# Patient Record
Sex: FEMALE | Race: WHITE | NOT HISPANIC OR LATINO | Employment: UNEMPLOYED | URBAN - NONMETROPOLITAN AREA
[De-identification: names, ages, dates, MRNs, and addresses within clinical notes are randomized per-mention and may not be internally consistent; named-entity substitution may affect disease eponyms.]

---

## 2019-04-12 ENCOUNTER — OFFICE VISIT CONVERTED (OUTPATIENT)
Dept: FAMILY MEDICINE CLINIC | Facility: CLINIC | Age: 49
End: 2019-04-12
Attending: NURSE PRACTITIONER

## 2019-04-12 ENCOUNTER — CONVERSION ENCOUNTER (OUTPATIENT)
Dept: FAMILY MEDICINE CLINIC | Facility: CLINIC | Age: 49
End: 2019-04-12

## 2019-09-24 ENCOUNTER — CONVERSION ENCOUNTER (OUTPATIENT)
Dept: FAMILY MEDICINE CLINIC | Facility: CLINIC | Age: 49
End: 2019-09-24

## 2019-09-24 ENCOUNTER — HOSPITAL ENCOUNTER (OUTPATIENT)
Dept: GENERAL RADIOLOGY | Facility: HOSPITAL | Age: 49
Discharge: HOME | End: 2019-09-24
Attending: NURSE PRACTITIONER

## 2019-09-24 ENCOUNTER — OFFICE VISIT CONVERTED (OUTPATIENT)
Dept: FAMILY MEDICINE CLINIC | Facility: CLINIC | Age: 49
End: 2019-09-24
Attending: NURSE PRACTITIONER

## 2019-10-01 ENCOUNTER — OFFICE VISIT CONVERTED (OUTPATIENT)
Dept: FAMILY MEDICINE CLINIC | Facility: CLINIC | Age: 49
End: 2019-10-01
Attending: NURSE PRACTITIONER

## 2019-12-31 ENCOUNTER — HOSPITAL ENCOUNTER (OUTPATIENT)
Dept: URGENT CARE | Facility: CLINIC | Age: 49
Discharge: HOME | End: 2019-12-31
Attending: FAMILY MEDICINE

## 2020-01-06 ENCOUNTER — HOSPITAL ENCOUNTER (OUTPATIENT)
Dept: GENERAL RADIOLOGY | Facility: HOSPITAL | Age: 50
Discharge: HOME | End: 2020-01-06

## 2020-01-06 ENCOUNTER — OFFICE VISIT CONVERTED (OUTPATIENT)
Dept: FAMILY MEDICINE CLINIC | Facility: CLINIC | Age: 50
End: 2020-01-06
Attending: NURSE PRACTITIONER

## 2020-01-06 ENCOUNTER — CONVERSION ENCOUNTER (OUTPATIENT)
Dept: FAMILY MEDICINE CLINIC | Facility: CLINIC | Age: 50
End: 2020-01-06

## 2020-01-06 LAB
ALBUMIN SERPL-MCNC: 4.1 G/DL (ref 3.5–5)
ALBUMIN/GLOB SERPL: 1.2 {RATIO} (ref 1.4–2.6)
ALP SERPL-CCNC: 82 U/L (ref 42–98)
ALT SERPL-CCNC: 45 U/L (ref 10–40)
ANION GAP SERPL CALC-SCNC: 18 MMOL/L (ref 8–19)
APPEARANCE UR: ABNORMAL
AST SERPL-CCNC: 50 U/L (ref 15–50)
BASOPHILS # BLD AUTO: 0.09 10*3/UL (ref 0–0.2)
BASOPHILS NFR BLD AUTO: 0.6 % (ref 0–3)
BILIRUB SERPL-MCNC: 0.2 MG/DL (ref 0.2–1.3)
BILIRUB UR QL: ABNORMAL
BUN SERPL-MCNC: 13 MG/DL (ref 5–25)
BUN/CREAT SERPL: 13 {RATIO} (ref 6–20)
CALCIUM SERPL-MCNC: 9.4 MG/DL (ref 8.7–10.4)
CASTS URNS QL MICRO: ABNORMAL /[LPF]
CHLORIDE SERPL-SCNC: 103 MMOL/L (ref 99–111)
CHOLEST SERPL-MCNC: 209 MG/DL (ref 107–200)
CHOLEST/HDLC SERPL: 2.3 {RATIO} (ref 3–6)
COLOR UR: ABNORMAL
CONV ABS IMM GRAN: 0.06 10*3/UL (ref 0–0.2)
CONV BACTERIA: ABNORMAL
CONV CO2: 26 MMOL/L (ref 22–32)
CONV COLLECTION SOURCE (UA): ABNORMAL
CONV CRYSTALS: ABNORMAL /[HPF]
CONV IMMATURE GRAN: 0.4 % (ref 0–1.8)
CONV TOTAL PROTEIN: 7.5 G/DL (ref 6.3–8.2)
CONV UROBILINOGEN IN URINE BY AUTOMATED TEST STRIP: 1 {EHRLICHU}/DL (ref 0.1–1)
CREAT UR-MCNC: 0.98 MG/DL (ref 0.5–0.9)
DEPRECATED RDW RBC AUTO: 49.4 FL (ref 36.4–46.3)
EOSINOPHIL # BLD AUTO: 0.35 10*3/UL (ref 0–0.7)
EOSINOPHIL # BLD AUTO: 2.4 % (ref 0–7)
ERYTHROCYTE [DISTWIDTH] IN BLOOD BY AUTOMATED COUNT: 14.1 % (ref 11.7–14.4)
EST. AVERAGE GLUCOSE BLD GHB EST-MCNC: 100 MG/DL
GFR SERPLBLD BASED ON 1.73 SQ M-ARVRAT: >60 ML/MIN/{1.73_M2}
GLOBULIN UR ELPH-MCNC: 3.4 G/DL (ref 2–3.5)
GLUCOSE SERPL-MCNC: 78 MG/DL (ref 65–99)
GLUCOSE UR QL: NEGATIVE MG/DL
HBA1C MFR BLD: 5.1 % (ref 3.5–5.7)
HCT VFR BLD AUTO: 42.2 % (ref 37–47)
HDLC SERPL-MCNC: 92 MG/DL (ref 40–60)
HGB BLD-MCNC: 13.7 G/DL (ref 12–16)
HGB UR QL STRIP: NEGATIVE
KETONES UR QL STRIP: ABNORMAL MG/DL
LDLC SERPL CALC-MCNC: 99 MG/DL (ref 70–100)
LEUKOCYTE ESTERASE UR QL STRIP: ABNORMAL
LYMPHOCYTES # BLD AUTO: 2.71 10*3/UL (ref 1–5)
LYMPHOCYTES NFR BLD AUTO: 18.3 % (ref 20–45)
MCH RBC QN AUTO: 30.9 PG (ref 27–31)
MCHC RBC AUTO-ENTMCNC: 32.5 G/DL (ref 33–37)
MCV RBC AUTO: 95.3 FL (ref 81–99)
MONOCYTES # BLD AUTO: 1.28 10*3/UL (ref 0.2–1.2)
MONOCYTES NFR BLD AUTO: 8.6 % (ref 3–10)
NEUTROPHILS # BLD AUTO: 10.35 10*3/UL (ref 2–8)
NEUTROPHILS NFR BLD AUTO: 69.7 % (ref 30–85)
NITRITE UR QL STRIP: POSITIVE
NRBC CBCN: 0 % (ref 0–0.7)
OSMOLALITY SERPL CALC.SUM OF ELEC: 295 MOSM/KG (ref 273–304)
PH UR STRIP.AUTO: 5.5 [PH] (ref 5–8)
PLATELET # BLD AUTO: 339 10*3/UL (ref 130–400)
PMV BLD AUTO: 10.6 FL (ref 9.4–12.3)
POTASSIUM SERPL-SCNC: 3.9 MMOL/L (ref 3.5–5.3)
PROT UR QL: ABNORMAL MG/DL
RBC # BLD AUTO: 4.43 10*6/UL (ref 4.2–5.4)
RBC #/AREA URNS HPF: ABNORMAL /[HPF]
SODIUM SERPL-SCNC: 143 MMOL/L (ref 135–147)
SP GR UR: 1.02 (ref 1–1.03)
SQUAMOUS SPT QL MICRO: ABNORMAL /[HPF]
TRIGL SERPL-MCNC: 88 MG/DL (ref 40–150)
TSH SERPL-ACNC: 1.46 M[IU]/L (ref 0.27–4.2)
VLDLC SERPL-MCNC: 18 MG/DL (ref 5–37)
WBC # BLD AUTO: 14.84 10*3/UL (ref 4.8–10.8)
WBC #/AREA URNS HPF: ABNORMAL /[HPF]

## 2020-01-09 ENCOUNTER — HOSPITAL ENCOUNTER (OUTPATIENT)
Dept: ULTRASOUND IMAGING | Facility: HOSPITAL | Age: 50
Discharge: HOME | End: 2020-01-09
Attending: NURSE PRACTITIONER

## 2020-08-04 ENCOUNTER — CONVERSION ENCOUNTER (OUTPATIENT)
Dept: FAMILY MEDICINE CLINIC | Facility: CLINIC | Age: 50
End: 2020-08-04

## 2020-08-04 ENCOUNTER — HOSPITAL ENCOUNTER (OUTPATIENT)
Dept: GENERAL RADIOLOGY | Facility: HOSPITAL | Age: 50
Discharge: HOME | End: 2020-08-04
Attending: NURSE PRACTITIONER

## 2020-08-04 ENCOUNTER — OFFICE VISIT CONVERTED (OUTPATIENT)
Dept: FAMILY MEDICINE CLINIC | Facility: CLINIC | Age: 50
End: 2020-08-04
Attending: NURSE PRACTITIONER

## 2020-08-18 ENCOUNTER — OFFICE VISIT CONVERTED (OUTPATIENT)
Dept: PODIATRY | Facility: CLINIC | Age: 50
End: 2020-08-18
Attending: PODIATRIST

## 2020-09-04 ENCOUNTER — CONVERSION ENCOUNTER (OUTPATIENT)
Dept: CARDIOLOGY | Facility: CLINIC | Age: 50
End: 2020-09-04

## 2020-09-04 ENCOUNTER — OFFICE VISIT CONVERTED (OUTPATIENT)
Dept: CARDIOLOGY | Facility: CLINIC | Age: 50
End: 2020-09-04
Attending: INTERNAL MEDICINE

## 2020-09-17 ENCOUNTER — OFFICE VISIT CONVERTED (OUTPATIENT)
Dept: CARDIOLOGY | Facility: CLINIC | Age: 50
End: 2020-09-17
Attending: INTERNAL MEDICINE

## 2020-09-30 ENCOUNTER — HOSPITAL ENCOUNTER (OUTPATIENT)
Dept: CARDIOLOGY | Facility: HOSPITAL | Age: 50
Discharge: HOME | End: 2020-09-30
Attending: INTERNAL MEDICINE

## 2020-10-23 ENCOUNTER — OFFICE VISIT CONVERTED (OUTPATIENT)
Dept: CARDIOLOGY | Facility: CLINIC | Age: 50
End: 2020-10-23
Attending: INTERNAL MEDICINE

## 2020-10-27 ENCOUNTER — TELEPHONE CONVERTED (OUTPATIENT)
Dept: FAMILY MEDICINE CLINIC | Facility: CLINIC | Age: 50
End: 2020-10-27
Attending: NURSE PRACTITIONER

## 2020-11-04 ENCOUNTER — OFFICE VISIT CONVERTED (OUTPATIENT)
Dept: FAMILY MEDICINE CLINIC | Facility: CLINIC | Age: 50
End: 2020-11-04
Attending: NURSE PRACTITIONER

## 2020-11-04 ENCOUNTER — HOSPITAL ENCOUNTER (OUTPATIENT)
Dept: OTHER | Facility: HOSPITAL | Age: 50
Discharge: HOME | End: 2020-11-04
Attending: NURSE PRACTITIONER

## 2020-11-04 ENCOUNTER — CONVERSION ENCOUNTER (OUTPATIENT)
Dept: FAMILY MEDICINE CLINIC | Facility: CLINIC | Age: 50
End: 2020-11-04

## 2020-11-10 LAB
CONV LAST MENSTURAL PERIOD: NORMAL
SPECIMEN SOURCE: NORMAL
SPECIMEN SOURCE: NORMAL
THIN PREP CVX: NORMAL

## 2021-03-02 ENCOUNTER — OFFICE VISIT (OUTPATIENT)
Dept: FAMILY MEDICINE CLINIC | Facility: CLINIC | Age: 51
End: 2021-03-02

## 2021-03-02 VITALS
HEART RATE: 115 BPM | RESPIRATION RATE: 16 BRPM | WEIGHT: 152.9 LBS | TEMPERATURE: 96.9 F | DIASTOLIC BLOOD PRESSURE: 114 MMHG | HEIGHT: 63 IN | BODY MASS INDEX: 27.09 KG/M2 | SYSTOLIC BLOOD PRESSURE: 160 MMHG | OXYGEN SATURATION: 100 %

## 2021-03-02 DIAGNOSIS — Z96.0 STATUS POST PLACEMENT OF URETERAL STENT: ICD-10-CM

## 2021-03-02 DIAGNOSIS — I10 ESSENTIAL HYPERTENSION: ICD-10-CM

## 2021-03-02 DIAGNOSIS — Z00.00 HEALTHCARE MAINTENANCE: ICD-10-CM

## 2021-03-02 DIAGNOSIS — N20.0 KIDNEY STONES: Primary | ICD-10-CM

## 2021-03-02 DIAGNOSIS — Z76.89 ENCOUNTER TO ESTABLISH CARE: ICD-10-CM

## 2021-03-02 DIAGNOSIS — Z12.11 SCREENING FOR COLON CANCER: ICD-10-CM

## 2021-03-02 LAB
BASOPHILS # BLD AUTO: 0.08 10*3/MM3 (ref 0–0.2)
BASOPHILS NFR BLD AUTO: 0.5 % (ref 0–1.5)
EOSINOPHIL # BLD AUTO: 0.57 10*3/MM3 (ref 0–0.4)
EOSINOPHIL NFR BLD AUTO: 3.8 % (ref 0.3–6.2)
ERYTHROCYTE [DISTWIDTH] IN BLOOD BY AUTOMATED COUNT: 12.3 % (ref 12.3–15.4)
HCT VFR BLD AUTO: 35.5 % (ref 34–46.6)
HGB BLD-MCNC: 12.1 G/DL (ref 12–15.9)
IMM GRANULOCYTES # BLD AUTO: 0.33 10*3/MM3 (ref 0–0.05)
IMM GRANULOCYTES NFR BLD AUTO: 2.2 % (ref 0–0.5)
LYMPHOCYTES # BLD AUTO: 2.11 10*3/MM3 (ref 0.7–3.1)
LYMPHOCYTES NFR BLD AUTO: 14.2 % (ref 19.6–45.3)
Lab: NORMAL
MCH RBC QN AUTO: 30 PG (ref 26.6–33)
MCHC RBC AUTO-ENTMCNC: 34.1 G/DL (ref 31.5–35.7)
MCV RBC AUTO: 87.9 FL (ref 79–97)
MONOCYTES # BLD AUTO: 1.63 10*3/MM3 (ref 0.1–0.9)
MONOCYTES NFR BLD AUTO: 11 % (ref 5–12)
NEUTROPHILS # BLD AUTO: 10.13 10*3/MM3 (ref 1.7–7)
NEUTROPHILS NFR BLD AUTO: 68.3 % (ref 42.7–76)
NRBC BLD AUTO-RTO: 0 /100 WBC (ref 0–0.2)
PLATELET # BLD AUTO: 379 10*3/MM3 (ref 140–450)
RBC # BLD AUTO: 4.04 10*6/MM3 (ref 3.77–5.28)
WBC # BLD AUTO: 14.85 10*3/MM3 (ref 3.4–10.8)

## 2021-03-02 PROCEDURE — 99204 OFFICE O/P NEW MOD 45 MIN: CPT | Performed by: NURSE PRACTITIONER

## 2021-03-02 RX ORDER — CLONIDINE HYDROCHLORIDE 0.3 MG/1
0.3 TABLET ORAL 3 TIMES DAILY
COMMUNITY
Start: 2021-01-06 | End: 2022-11-10

## 2021-03-02 RX ORDER — DEXTROAMPHETAMINE SACCHARATE, AMPHETAMINE ASPARTATE, DEXTROAMPHETAMINE SULFATE AND AMPHETAMINE SULFATE 5; 5; 5; 5 MG/1; MG/1; MG/1; MG/1
1 TABLET ORAL 3 TIMES DAILY
COMMUNITY
Start: 2021-02-09 | End: 2021-04-30

## 2021-03-02 RX ORDER — DEXTROAMPHETAMINE SACCHARATE, AMPHETAMINE ASPARTATE MONOHYDRATE, DEXTROAMPHETAMINE SULFATE AND AMPHETAMINE SULFATE 7.5; 7.5; 7.5; 7.5 MG/1; MG/1; MG/1; MG/1
CAPSULE, EXTENDED RELEASE ORAL EVERY MORNING
COMMUNITY
Start: 2020-12-09 | End: 2021-04-30

## 2021-03-02 RX ORDER — LOSARTAN POTASSIUM 100 MG/1
100 TABLET ORAL DAILY
COMMUNITY
Start: 2021-01-06 | End: 2021-07-13 | Stop reason: SDUPTHER

## 2021-03-02 RX ORDER — AMLODIPINE BESYLATE 10 MG/1
10 TABLET ORAL DAILY
COMMUNITY
Start: 2021-01-06 | End: 2021-04-30 | Stop reason: SDUPTHER

## 2021-03-02 RX ORDER — LORAZEPAM 0.5 MG/1
0.5 TABLET ORAL NIGHTLY PRN
COMMUNITY
Start: 2020-12-09 | End: 2022-11-10

## 2021-03-02 RX ORDER — HYDROCODONE BITARTRATE AND ACETAMINOPHEN 5; 325 MG/1; MG/1
1 TABLET ORAL EVERY 6 HOURS PRN
Qty: 12 TABLET | Refills: 0 | Status: SHIPPED | OUTPATIENT
Start: 2021-03-02 | End: 2021-04-30

## 2021-03-02 NOTE — PROGRESS NOTES
Chief Complaint  Establish Care and Flank Pain (left side since Thursday morning kidney stones)    Subjective          Vanesa Escudero presents to [facility]  Went to  on Thursday, due to flank pain, diagnosed with kidney stones. Was directed to hospital, stents placed.  [facility], transferred to [facility]. Admitted on Thursday and discharged  Transferred to the hospital via ambulance and had emergency surgery. Discharged yesterday 03/01. Reports increasing swelling, feels like she has constant urge to urinate, hematuria, feels like she is trying to pass stone.   Pain was primarily on left side. Stents placed.      Took bp medication today, elevated at 160/114, repeat did show some improvement at 140/100    LMP 3 years ago    Cefdinir 300 mg bid    Urologist jose, no follow up appointment scheduled    Flank Pain  This is a new problem. The current episode started in the past 7 days. The problem occurs constantly. The problem is unchanged. Pain location: left flank. The quality of the pain is described as aching. The pain is at a severity of 8/10. The pain is moderate. Associated symptoms include abdominal pain and dysuria. Pertinent negatives include no bladder incontinence, bowel incontinence, chest pain, fever, headaches, leg pain, numbness, paresis, paresthesias, pelvic pain, perianal numbness, tingling, weakness or weight loss. Treatments tried: iv antibiotics, now on oral cefdinir. The treatment provided mild relief.       Review of Systems   Constitutional: Negative for fever and unexpected weight loss.   Cardiovascular: Negative for chest pain.   Gastrointestinal: Positive for abdominal pain. Negative for bowel incontinence.   Genitourinary: Positive for dysuria and flank pain. Negative for pelvic pain and urinary incontinence.   Neurological: Negative for tingling, weakness, numbness and paresthesias.     Objective   Vital Signs:   BP (!) 160/114   Pulse 115    "Temp 96.9 °F (36.1 °C) (Temporal)   Resp 16   Ht 160 cm (63\")   Wt 69.4 kg (152 lb 14.4 oz)   SpO2 100%   BMI 27.09 kg/m²     Physical Exam  Vitals signs reviewed.   Constitutional:       General: She is not in acute distress.     Appearance: She is well-developed. She is not diaphoretic.   HENT:      Head: Normocephalic and atraumatic.      Right Ear: Tympanic membrane, ear canal and external ear normal.      Left Ear: Tympanic membrane, ear canal and external ear normal.      Nose: Nose normal.      Mouth/Throat:      Pharynx: Uvula midline. No oropharyngeal exudate.   Neck:      Musculoskeletal: Neck supple.   Cardiovascular:      Rate and Rhythm: Normal rate and regular rhythm.      Heart sounds: Normal heart sounds. No murmur. No friction rub. No gallop.    Pulmonary:      Effort: Pulmonary effort is normal. No respiratory distress.      Breath sounds: Normal breath sounds. No wheezing or rales.   Abdominal:      General: Bowel sounds are normal. There is no distension.      Palpations: Abdomen is soft.      Tenderness: There is no abdominal tenderness.   Skin:     General: Skin is warm and dry.   Neurological:      Mental Status: She is alert and oriented to person, place, and time.        Result Review :            discharge summary, CT scan, labs and medications reviewed at time of visit. See scanned documents.     Assessment and Plan    Diagnoses and all orders for this visit:    1. Kidney stones (Primary)  -     CBC & Differential  -     HYDROcodone-acetaminophen (NORCO) 5-325 MG per tablet; Take 1 tablet by mouth Every 6 (Six) Hours As Needed for Severe Pain .  Dispense: 12 tablet; Refill: 0    2. Encounter to establish care  -     Ambulatory Referral to Gynecology    3. Screening for colon cancer  -     Ambulatory Referral For Screening Colonoscopy    4. Healthcare maintenance  -     Ambulatory Referral to Gynecology    5. Status post placement of ureteral stent    6. Essential hypertension      I " spent 60 minutes caring for Vanesa on this date of service. This time includes time spent by me in the following activities:reviewing tests, obtaining and/or reviewing a separately obtained history, performing a medically appropriate examination and/or evaluation , counseling and educating the patient/family/caregiver, ordering medications, tests, or procedures and documenting information in the medical record  Follow Up   No follow-ups on file.  Patient was given instructions and counseling regarding her condition or for health maintenance advice. Please see specific information pulled into the AVS if appropriate.     Patient is prescribed HTN medications, taking daily  bp is increased, 160/114, repeat 140/100  Does appear to be in pain, WBC at highest 35, trending downward to 17 at discharge yesterday  Discharged on omnicef twice daily, taking medication as prescribed  Also started on flomax and   Will prescribe 3 day med supply of pain medication, hydrocodone, reviewed fuentes prior to filling, calling urology to schedule follow up appointment  Does have some abdominal bloating, however do not know baseline of patient, with generalized tenderness on left side  Repeat cbc today    No colonoscopy previously, no GYN, referrals placed for both  Will hold on immunizations at this time, consider pneumonia vaccine in future when feeling well  Follow up in two weeks unless doing well with urology  Instructions given to return to hospital for acute worsening symptoms including fever/chills,   Trying to schedule office visit asap, left message with urology

## 2021-03-08 ENCOUNTER — TELEPHONE (OUTPATIENT)
Dept: FAMILY MEDICINE CLINIC | Facility: CLINIC | Age: 51
End: 2021-03-08

## 2021-03-08 NOTE — TELEPHONE ENCOUNTER
Caller: JEFFREY DONALDSON    Relationship: Emergency Contact    Best call back number: [phone]    What medication are you requesting: ANTIBIOTIC    What are your current symptoms: KIDNEY INFECTION      Have you had these symptoms before:    [x] Yes  [] No    Have you been treated for these symptoms before:   [x] Yes  [] No    If a prescription is needed, what is your preferred pharmacy and phone number: [facility] [address] [phone] [facility] [phone] FX     Additional notes: PATIENT'S FIANCE STATES PATIENT HAD A STENT PUT INTO HER KIDNEY AND HER UROLOGIST WON'T PRESCRIBE ANTIBIOTICS UNTIL SHE SEES THEM AGAIN NEXT WEEK. PATIENT IS REQUESTING ANTIBIOTIC. CALLBACK REQUESTED ONCE SENT TO PHARMACY.

## 2021-03-09 NOTE — TELEPHONE ENCOUNTER
This is the specialist managing her case currently. She may need to get in to see them sooner, but recommend follow up with urology.

## 2021-03-14 ENCOUNTER — TELEPHONE (OUTPATIENT)
Dept: FAMILY MEDICINE CLINIC | Facility: CLINIC | Age: 51
End: 2021-03-14

## 2021-03-14 NOTE — TELEPHONE ENCOUNTER
Pt's SO Darwin called the on-call.   She is admitted at [facility] and unclear of her treatment.   They went to [facility] on 3/12 hoping to be admitted there if needing admission but given her urologist was through [facility] and did not come to [facility] she was encouraged to follow him (Dr. Mcallister) for continuity of care.   She has elevated WBC. Noted 20.4 in the chart x 2 but Darwin says her WBC was 16 today.  She is getting rocephin but said should not be getting any of this because allergic to PCN.   She was getting better after her 3 day course of abx from urology but then starting to have more pain and symptoms 3/8. Called office of PCP and advised to follow with urology given the concern and her ongoing care with them. She had appointment with Dr. Mcallister 3/9 and Darwin says pt was started on myrbetriq and then could not void well and became more sick so went to ED 3/12.  They are unsure of the plan and they are concerned about her current treatment. They have not seen Dr. Mcallister or Dr. Monroy were to come in one day but said they did not. They have only seen residents who are in and out quickly.   Because she is sick, I don't have all the notes and I don't think it is feasible at this time to, as Darwin requested, transfer pt to [facility]. I recommended they request speaking to the charge nurse to try to get some more information. It is likely because it is Sunday and late that they will only have access to residents but the residents have access to the attendings and they could get some information to help them understand the treatment and get some questions answered. Tomorrow Monday should be able to have a doctor come to the room to discuss care.   Tomorrow I will have staff request documents and studies that we do not have in the chart from [facility] and her urologist to complete the chart and facilitate question answering. Darwin is going to call the office tomorrow and give us and update. I will let Jocelyn know. She is out tomorrow  so I can help with this follow up as well.

## 2021-03-26 ENCOUNTER — HOSPITAL ENCOUNTER (OUTPATIENT)
Dept: CARDIOLOGY | Facility: HOSPITAL | Age: 51
Discharge: HOME | End: 2021-03-26
Admitting: NURSE PRACTITIONER

## 2021-03-26 ENCOUNTER — OFFICE VISIT (OUTPATIENT)
Dept: FAMILY MEDICINE CLINIC | Facility: CLINIC | Age: 51
End: 2021-03-26

## 2021-03-26 VITALS
HEIGHT: 63 IN | OXYGEN SATURATION: 98 % | SYSTOLIC BLOOD PRESSURE: 162 MMHG | HEART RATE: 99 BPM | TEMPERATURE: 97.8 F | BODY MASS INDEX: 26.95 KG/M2 | WEIGHT: 152.1 LBS | DIASTOLIC BLOOD PRESSURE: 110 MMHG

## 2021-03-26 DIAGNOSIS — I15.9 SECONDARY HYPERTENSION: ICD-10-CM

## 2021-03-26 DIAGNOSIS — N20.0 KIDNEY STONE: ICD-10-CM

## 2021-03-26 DIAGNOSIS — M79.604 BILATERAL LEG PAIN: Primary | ICD-10-CM

## 2021-03-26 DIAGNOSIS — M79.605 BILATERAL LEG PAIN: Primary | ICD-10-CM

## 2021-03-26 DIAGNOSIS — M79.89 LEG SWELLING: ICD-10-CM

## 2021-03-26 LAB
ALBUMIN SERPL-MCNC: 4.2 G/DL (ref 3.5–5.2)
ALBUMIN/GLOB SERPL: 1.2 G/DL
ALP SERPL-CCNC: 148 U/L (ref 39–117)
ALT SERPL-CCNC: 40 U/L (ref 1–33)
AST SERPL-CCNC: 44 U/L (ref 1–32)
BASOPHILS # BLD AUTO: 0.04 10*3/MM3 (ref 0–0.2)
BASOPHILS NFR BLD AUTO: 0.3 % (ref 0–1.5)
BH CV LOW VAS RIGHT POPLITEAL SPONT: 1
BH CV LOWER VASCULAR LEFT COMMON FEMORAL AUGMENT: NORMAL
BH CV LOWER VASCULAR LEFT COMMON FEMORAL COMPETENT: NORMAL
BH CV LOWER VASCULAR LEFT COMMON FEMORAL COMPRESS: NORMAL
BH CV LOWER VASCULAR LEFT COMMON FEMORAL PHASIC: NORMAL
BH CV LOWER VASCULAR LEFT COMMON FEMORAL SPONT: NORMAL
BH CV LOWER VASCULAR LEFT DISTAL FEMORAL COMPRESS: NORMAL
BH CV LOWER VASCULAR LEFT GASTRONEMIUS COMPRESS: NORMAL
BH CV LOWER VASCULAR LEFT GREATER SAPH AK COMPRESS: NORMAL
BH CV LOWER VASCULAR LEFT GREATER SAPH BK COMPRESS: NORMAL
BH CV LOWER VASCULAR LEFT LESSER SAPH COMPRESS: NORMAL
BH CV LOWER VASCULAR LEFT MID FEMORAL AUGMENT: NORMAL
BH CV LOWER VASCULAR LEFT MID FEMORAL COMPETENT: NORMAL
BH CV LOWER VASCULAR LEFT MID FEMORAL COMPRESS: NORMAL
BH CV LOWER VASCULAR LEFT MID FEMORAL PHASIC: NORMAL
BH CV LOWER VASCULAR LEFT MID FEMORAL SPONT: NORMAL
BH CV LOWER VASCULAR LEFT PERONEAL COMPRESS: NORMAL
BH CV LOWER VASCULAR LEFT POPLITEAL AUGMENT: NORMAL
BH CV LOWER VASCULAR LEFT POPLITEAL COMPETENT: NORMAL
BH CV LOWER VASCULAR LEFT POPLITEAL COMPRESS: NORMAL
BH CV LOWER VASCULAR LEFT POPLITEAL PHASIC: NORMAL
BH CV LOWER VASCULAR LEFT POPLITEAL SPONT: NORMAL
BH CV LOWER VASCULAR LEFT POSTERIOR TIBIAL COMPRESS: NORMAL
BH CV LOWER VASCULAR LEFT PROFUNDA FEMORAL COMPRESS: NORMAL
BH CV LOWER VASCULAR LEFT PROXIMAL FEMORAL COMPRESS: NORMAL
BH CV LOWER VASCULAR LEFT SAPHENOFEMORAL JUNCTION COMPRESS: NORMAL
BH CV LOWER VASCULAR RIGHT COMMON FEMORAL AUGMENT: NORMAL
BH CV LOWER VASCULAR RIGHT COMMON FEMORAL COMPETENT: NORMAL
BH CV LOWER VASCULAR RIGHT COMMON FEMORAL COMPRESS: NORMAL
BH CV LOWER VASCULAR RIGHT COMMON FEMORAL PHASIC: NORMAL
BH CV LOWER VASCULAR RIGHT COMMON FEMORAL SPONT: NORMAL
BH CV LOWER VASCULAR RIGHT DISTAL FEMORAL COMPRESS: NORMAL
BH CV LOWER VASCULAR RIGHT GASTRONEMIUS COMPRESS: NORMAL
BH CV LOWER VASCULAR RIGHT GREATER SAPH AK COMPRESS: NORMAL
BH CV LOWER VASCULAR RIGHT GREATER SAPH BK COMPRESS: NORMAL
BH CV LOWER VASCULAR RIGHT LESSER SAPH COMPRESS: NORMAL
BH CV LOWER VASCULAR RIGHT MID FEMORAL AUGMENT: NORMAL
BH CV LOWER VASCULAR RIGHT MID FEMORAL COMPETENT: NORMAL
BH CV LOWER VASCULAR RIGHT MID FEMORAL COMPRESS: NORMAL
BH CV LOWER VASCULAR RIGHT MID FEMORAL PHASIC: NORMAL
BH CV LOWER VASCULAR RIGHT MID FEMORAL SPONT: NORMAL
BH CV LOWER VASCULAR RIGHT PERONEAL COMPRESS: NORMAL
BH CV LOWER VASCULAR RIGHT POPLITEAL AUGMENT: NORMAL
BH CV LOWER VASCULAR RIGHT POPLITEAL COMPETENT: NORMAL
BH CV LOWER VASCULAR RIGHT POPLITEAL COMPRESS: NORMAL
BH CV LOWER VASCULAR RIGHT POPLITEAL PHASIC: NORMAL
BH CV LOWER VASCULAR RIGHT POPLITEAL SPONT: NORMAL
BH CV LOWER VASCULAR RIGHT POSTERIOR TIBIAL COMPRESS: NORMAL
BH CV LOWER VASCULAR RIGHT PROFUNDA FEMORAL COMPRESS: NORMAL
BH CV LOWER VASCULAR RIGHT PROXIMAL FEMORAL COMPRESS: NORMAL
BH CV LOWER VASCULAR RIGHT SAPHENOFEMORAL JUNCTION COMPRESS: NORMAL
BH CV VAS POP FLUID COLLECTED: 1
BILIRUB SERPL-MCNC: 0.2 MG/DL (ref 0–1.2)
BUN SERPL-MCNC: 13 MG/DL (ref 6–20)
BUN/CREAT SERPL: 12.5 (ref 7–25)
CALCIUM SERPL-MCNC: 10 MG/DL (ref 8.6–10.5)
CHLORIDE SERPL-SCNC: 104 MMOL/L (ref 98–107)
CO2 SERPL-SCNC: 27.3 MMOL/L (ref 22–29)
CREAT SERPL-MCNC: 1.04 MG/DL (ref 0.57–1)
EOSINOPHIL # BLD AUTO: 0.2 10*3/MM3 (ref 0–0.4)
EOSINOPHIL NFR BLD AUTO: 1.7 % (ref 0.3–6.2)
ERYTHROCYTE [DISTWIDTH] IN BLOOD BY AUTOMATED COUNT: 12.7 % (ref 12.3–15.4)
GLOBULIN SER CALC-MCNC: 3.6 GM/DL
GLUCOSE SERPL-MCNC: 111 MG/DL (ref 65–99)
HCT VFR BLD AUTO: 36.9 % (ref 34–46.6)
HGB BLD-MCNC: 12.7 G/DL (ref 12–15.9)
IMM GRANULOCYTES # BLD AUTO: 0.17 10*3/MM3 (ref 0–0.05)
IMM GRANULOCYTES NFR BLD AUTO: 1.4 % (ref 0–0.5)
LYMPHOCYTES # BLD AUTO: 1.95 10*3/MM3 (ref 0.7–3.1)
LYMPHOCYTES NFR BLD AUTO: 16.6 % (ref 19.6–45.3)
MCH RBC QN AUTO: 29.1 PG (ref 26.6–33)
MCHC RBC AUTO-ENTMCNC: 34.4 G/DL (ref 31.5–35.7)
MCV RBC AUTO: 84.6 FL (ref 79–97)
MONOCYTES # BLD AUTO: 0.97 10*3/MM3 (ref 0.1–0.9)
MONOCYTES NFR BLD AUTO: 8.3 % (ref 5–12)
NEUTROPHILS # BLD AUTO: 8.42 10*3/MM3 (ref 1.7–7)
NEUTROPHILS NFR BLD AUTO: 71.7 % (ref 42.7–76)
NRBC BLD AUTO-RTO: 0 /100 WBC (ref 0–0.2)
PLATELET # BLD AUTO: 464 10*3/MM3 (ref 140–450)
POTASSIUM SERPL-SCNC: 4.8 MMOL/L (ref 3.5–5.2)
PROT SERPL-MCNC: 7.8 G/DL (ref 6–8.5)
RBC # BLD AUTO: 4.36 10*6/MM3 (ref 3.77–5.28)
SODIUM SERPL-SCNC: 139 MMOL/L (ref 136–145)
TSH SERPL DL<=0.005 MIU/L-ACNC: 0.49 UIU/ML (ref 0.27–4.2)
WBC # BLD AUTO: 11.75 10*3/MM3 (ref 3.4–10.8)

## 2021-03-26 PROCEDURE — 99214 OFFICE O/P EST MOD 30 MIN: CPT | Performed by: NURSE PRACTITIONER

## 2021-03-26 PROCEDURE — 93970 EXTREMITY STUDY: CPT

## 2021-03-26 RX ORDER — TAMSULOSIN HYDROCHLORIDE 0.4 MG/1
0.4 CAPSULE ORAL DAILY
COMMUNITY
Start: 2021-03-12 | End: 2021-04-30

## 2021-03-26 RX ORDER — NITROFURANTOIN MACROCRYSTALS 100 MG/1
100 CAPSULE ORAL 4 TIMES DAILY
COMMUNITY
End: 2021-04-30

## 2021-03-26 RX ORDER — METOPROLOL SUCCINATE 50 MG/1
50 TABLET, EXTENDED RELEASE ORAL DAILY
Qty: 30 TABLET | Refills: 2 | Status: SHIPPED | OUTPATIENT
Start: 2021-03-26 | End: 2021-04-05 | Stop reason: SDUPTHER

## 2021-03-26 RX ORDER — HYDROCHLOROTHIAZIDE 12.5 MG/1
12.5 TABLET ORAL DAILY
Qty: 30 TABLET | Refills: 2 | Status: SHIPPED | OUTPATIENT
Start: 2021-03-26 | End: 2021-07-14

## 2021-03-26 NOTE — PATIENT INSTRUCTIONS
Discharge instructions    Stat venous Doppler study today I will talk to you in a little bit on your results    Need better control blood pressure HCTZ 12.5 mg a day  Generic Toprol 50 mg daily for blood pressure recheck in 1 week    Blood pressure and leg pain  Push fluids a minimum of 64 ounces of water daily  Elevate legs when sitting focus on breads and pastas and sodas decrease these they are high in sodium and cause fluid retention    Pressure stockings JUAN CARLOS hose 20-30 knee-high proper fit on the morning off at night for swelling    Increased chest pain shortness of breath high fever lethargy weakness abdominal pain pelvic pain emergency room    Fluids fluids fluids

## 2021-03-26 NOTE — PROGRESS NOTES
BLE Venous doppler study complete. Preliminary negative for DVT, fluid at right pop fossa 5 x 2 cm called to james Epley APRN

## 2021-03-26 NOTE — PROGRESS NOTES
"Chief Complaint  knot behind right knee size of an egg    Subjective          Vanesa Escudero presents to [facility]  Patient complains of posterior right knee pain leg pain and swelling x1 week no history of injury she has had quite a bit of swelling in her lower extremities yesterday's better today no chest pain no shortness of breath she has no fevers no chills no abdominal pain or other complaints.  She has had no redness or streaking  Recently had sepsis was in the hospital for over a week, and she had a stent placed last week complications regarding obstructed kidney stone.  She has had a really lousy month she has no history DVT PE no family history of coagulation difficulties.  No chronic knee pains no history of malignancy.    Uncontrolled hypertension, uncontrolled despite she is compliant with her medications  Recheck today with similar results 162/110        Objective   Vital Signs:   BP (!) 162/110   Pulse 99   Temp 97.8 °F (36.6 °C) (Temporal)   Ht 160 cm (63\")   Wt 69 kg (152 lb 1.6 oz)   SpO2 98%   BMI 26.94 kg/m²     Physical Exam  Vitals reviewed.   Constitutional:       Appearance: She is well-developed.   HENT:      Head: Normocephalic.      Nose: Nose normal.   Eyes:      General: No scleral icterus.     Conjunctiva/sclera: Conjunctivae normal.      Pupils: Pupils are equal, round, and reactive to light.   Neck:      Thyroid: No thyromegaly.      Vascular: No JVD.   Cardiovascular:      Rate and Rhythm: Normal rate and regular rhythm.      Heart sounds: Normal heart sounds. No murmur heard.   No friction rub. No gallop.    Pulmonary:      Effort: Pulmonary effort is normal. No respiratory distress.      Breath sounds: Normal breath sounds. No stridor. No wheezing or rales.   Abdominal:      General: Bowel sounds are normal. There is no distension.      Palpations: Abdomen is soft.      Tenderness: There is no abdominal tenderness.      Comments: No " hepatosplenomegaly, no ascites,   Musculoskeletal:         General: Swelling and tenderness present. No deformity or signs of injury. Normal range of motion.      Cervical back: Neck supple.      Right lower leg: No edema.      Comments: Right knee full range of motion without effusion or redness or tenderness except she has some mild swelling posterior knee and tenderness without redness increased heat other signs and symptoms of cellulitis.  No calf tenderness negative Homans negative thigh tenderness   Lymphadenopathy:      Cervical: No cervical adenopathy.   Skin:     General: Skin is warm and dry.      Findings: No erythema or rash.   Neurological:      Mental Status: She is alert and oriented to person, place, and time.      Deep Tendon Reflexes: Reflexes are normal and symmetric.   Psychiatric:         Behavior: Behavior normal.         Thought Content: Thought content normal.         Judgment: Judgment normal.        Result Review :                 Assessment and Plan {CC Problem List  Visit Diagnosis  ROS  Review (Popup)  [facility] Maintenance  Quality  BestPractice  Medications  SmartSets  SnapShot Encounters  Media :23}   Diagnoses and all orders for this visit:    1. Bilateral leg pain (Primary)  -     Duplex Venous Lower Extremity - Bilateral CAR  -     CBC & Differential  -     Comprehensive Metabolic Panel  -     TSH Rfx On Abnormal To Free T4    2. Leg swelling  -     Duplex Venous Lower Extremity - Bilateral CAR  -     CBC & Differential  -     Comprehensive Metabolic Panel  -     TSH Rfx On Abnormal To Free T4    3. Secondary hypertension  -     CBC & Differential  -     Comprehensive Metabolic Panel  -     TSH Rfx On Abnormal To Free T4    4. Kidney stone  -     CBC & Differential  -     Comprehensive Metabolic Panel  -     TSH Rfx On Abnormal To Free T4    Other orders  -     hydroCHLOROthiazide (HYDRODIURIL) 12.5 MG tablet; Take 1 tablet by mouth Daily.  Dispense: 30 tablet; Refill:  2  -     metoprolol succinate XL (Toprol XL) 50 MG 24 hr tablet; Take 1 tablet by mouth Daily. For hypertension  Dispense: 30 tablet; Refill: 2        Follow Up   No follow-ups on file.  Patient was given instructions and counseling regarding her condition or for health maintenance advice. Please see specific information pulled into the AVS if appropriate.   Patient is high risk for DVT with immobilization secondary to recent sepsis and stone and UTI.  She has no significant edema around her ankles today    Rule out DVTs stat venous Doppler ultrasound results came back without any acute DVT she has a 5 cm x 2 cm pocket of fluid posterior knee otherwise no acute abnormality.  Discussed with patient instructed elevate legs whenever sitting  Ice as needed no other medications regarding the knee at this time she will continue her routine medication finish her antibiotic  Should she have increased chest pain shortness of breath fever chills emergency room she will send me a message next week for update if is not improving in a couple weeks we will send her to Ortho or sooner if worsens severe pain redness swelling emergency room      Check recent renal function no failure,  We will use a very mild diuretic as well as metoprolol she has had no problems of metoprolol in the past although it was not adequate and had to be changed follow-up blood pressure readings next week and schedule appointment with Jocelyn Ardon

## 2021-04-05 ENCOUNTER — OFFICE VISIT (OUTPATIENT)
Dept: FAMILY MEDICINE CLINIC | Facility: CLINIC | Age: 51
End: 2021-04-05

## 2021-04-05 VITALS
DIASTOLIC BLOOD PRESSURE: 99 MMHG | OXYGEN SATURATION: 95 % | HEART RATE: 91 BPM | HEIGHT: 63 IN | WEIGHT: 150.5 LBS | SYSTOLIC BLOOD PRESSURE: 150 MMHG | TEMPERATURE: 97.5 F | BODY MASS INDEX: 26.67 KG/M2

## 2021-04-05 DIAGNOSIS — S76.211A STRAIN OF RIGHT GROIN: Primary | ICD-10-CM

## 2021-04-05 DIAGNOSIS — S76.911A MUSCLE STRAIN OF RIGHT THIGH, INITIAL ENCOUNTER: ICD-10-CM

## 2021-04-05 DIAGNOSIS — M25.561 ACUTE PAIN OF RIGHT KNEE: ICD-10-CM

## 2021-04-05 DIAGNOSIS — S76.211A INGUINAL STRAIN, RIGHT, INITIAL ENCOUNTER: ICD-10-CM

## 2021-04-05 DIAGNOSIS — I15.9 SECONDARY HYPERTENSION: ICD-10-CM

## 2021-04-05 PROCEDURE — 99214 OFFICE O/P EST MOD 30 MIN: CPT | Performed by: NURSE PRACTITIONER

## 2021-04-05 RX ORDER — METOPROLOL SUCCINATE 100 MG/1
100 TABLET, EXTENDED RELEASE ORAL DAILY
Qty: 30 TABLET | Refills: 2 | Status: SHIPPED | OUTPATIENT
Start: 2021-04-05 | End: 2022-03-16 | Stop reason: SDUPTHER

## 2021-04-05 NOTE — PROGRESS NOTES
"Chief Complaint  follow-up leg pain    Subjective          Vanesa Escudero presents to [facility]  Very pleasant patient here today follow-up right posterior knee pain and tenderness  Small lump and recent venous Doppler study to rule out any DVT.  She has no increasing pain no increasing redness or swelling with this she has been trying to improve her health but she is been in the hospital for sepsis and kidney stones  She is at the gym working out doing thigh and groin presses no immediate injury but later on that night started having right inguinal pain and groin pain and swelling persist for several days.  She is without chest pain shortness of breath presently without redness fever pain down her legs.  She has no increased edema.    She has chronic hypertension secondary hypertension she is on 5 hypertensives and her blood pressure is uncontrolled she does smoke but only modestly overweight    I do not think she has had a scan of her renal arteries and we will proceed with   Blood pressure still elevated no chest pain or shortness of breath she wants to quit smoking tobacco soon          Objective   Vital Signs:   /99   Pulse 91   Temp 97.5 °F (36.4 °C) (Temporal)   Ht 160 cm (63\")   Wt 68.3 kg (150 lb 8 oz)   SpO2 95%   BMI 26.66 kg/m²     Physical Exam  Vitals reviewed.   Constitutional:       Appearance: She is well-developed.   HENT:      Head: Normocephalic.      Nose: Nose normal.   Eyes:      General: No scleral icterus.     Conjunctiva/sclera: Conjunctivae normal.      Pupils: Pupils are equal, round, and reactive to light.   Neck:      Thyroid: No thyromegaly.      Vascular: No JVD.   Cardiovascular:      Rate and Rhythm: Normal rate and regular rhythm.      Heart sounds: Normal heart sounds. No murmur heard.   No friction rub. No gallop.    Pulmonary:      Effort: Pulmonary effort is normal. No respiratory distress.      Breath sounds: Normal breath sounds. No " stridor. No wheezing or rales.   Abdominal:      General: Bowel sounds are normal. There is no distension.      Palpations: Abdomen is soft.      Tenderness: There is no abdominal tenderness.      Comments: No hepatosplenomegaly, no ascites,   Musculoskeletal:         General: No tenderness.      Cervical back: Neck supple.      Comments: Tenderness right inguinal ligament and right medial thigh but does not extend into the posterior knee or any calf tenderness.  No redness no edema more tenderness over the inguinal ligament insertion site no palpable abnormality, appears equal bilateral,  Gait normal   Lymphadenopathy:      Cervical: No cervical adenopathy.   Skin:     General: Skin is warm and dry.      Findings: No erythema or rash.   Neurological:      Mental Status: She is alert and oriented to person, place, and time.      Deep Tendon Reflexes: Reflexes are normal and symmetric.   Psychiatric:         Behavior: Behavior normal.         Thought Content: Thought content normal.         Judgment: Judgment normal.        Result Review :                 Assessment and Plan    There are no diagnoses linked to this encounter.    Follow Up   No follow-ups on file.  Patient was given instructions and counseling regarding her condition or for health maintenance advice. Please see specific information pulled into the AVS if appropriate.     Patient's physical exam matches her HPI, she has no evidence of any fracture or ligament disruption she has no history of trauma other than working out too hard    Light stretching relaxation heat or ice  She will follow instructions in the office here  She should recheck in a week with MARIO Simmons any severe pain fever chills weakness emergency room

## 2021-04-05 NOTE — PATIENT INSTRUCTIONS
Discharge instructions    See Dr. Mcdonald for possible cyst right posterior knee for an evaluation    You have a right groin strain heat massage gentle massage heat or ice whichever helps the most  Your kidney function was borderline today we need to repeat this to rule out any chronic renal insufficiency  Generally should avoid ibuprofen naproxen  Anti-inflammatories can also raise your blood pressure    Take Tylenol 650 extended release 2 tablets twice daily    Flexeril 5 mg 1 tablet 3 times a day as needed for muscle spasms do not take if without spasm caution potential sedation    Some light stretching massage treat this as a injury and should improve over the next 2 to 3 weeks if is not having significant improvement in a couple weeks we may need to the MRI but I doubt this    Outpatient ultrasound to evaluate the blood supply to your kidneys to rule out secondary hypertension related to any blood supply issues    Encourage you to quit smoking as well as  Recommend vascular screenings    Consider CT calcium score coronary arteries at some point the next couple years of this year  Insurance does not cover this this is a screen for early heart disease talk to Jocelyn about these things  Schedule follow-up visit with Jocelyn sometime the next couple weeks    Increase metoprolol 100 mg daily for blood pressure and continue her other antihypertensives really focus on decreasing sodium in your diet bread pasta rice etc. its in there.    Slowly get back to your routine over the next several months you can go ahead and work out but just work around your pain of your groin and use light weights

## 2021-04-06 ENCOUNTER — TELEPHONE (OUTPATIENT)
Dept: FAMILY MEDICINE CLINIC | Facility: CLINIC | Age: 51
End: 2021-04-06

## 2021-04-06 ENCOUNTER — BULK ORDERING (OUTPATIENT)
Dept: CASE MANAGEMENT | Facility: OTHER | Age: 51
End: 2021-04-06

## 2021-04-06 DIAGNOSIS — Z23 IMMUNIZATION DUE: ICD-10-CM

## 2021-04-06 NOTE — TELEPHONE ENCOUNTER
Darwin the patients tomy called in stating one of the patients RX was not sent to the pharmacy.    Best call back # [phone]

## 2021-04-14 ENCOUNTER — TELEPHONE (OUTPATIENT)
Dept: FAMILY MEDICINE CLINIC | Facility: CLINIC | Age: 51
End: 2021-04-14

## 2021-04-14 DIAGNOSIS — R79.89 ELEVATED LFTS: Primary | ICD-10-CM

## 2021-04-14 NOTE — TELEPHONE ENCOUNTER
Caller: Vanesa Escudero    Relationship: Self    Best call back number: [phone]    What is the best time to reach you: AFTER 4:00 PM    Who are you requesting to speak with (clinical staff, provider,  specific staff member): CLINICAL STAFF    Do you know the name of the person who called: PATIENT COULD NOT REMEMBER THE NAME.     What was the call regarding: LAB RESULTS    Do you require a callback: YES

## 2021-04-19 ENCOUNTER — OFFICE VISIT (OUTPATIENT)
Dept: FAMILY MEDICINE CLINIC | Facility: CLINIC | Age: 51
End: 2021-04-19

## 2021-04-19 ENCOUNTER — OFFICE VISIT (OUTPATIENT)
Dept: ORTHOPEDIC SURGERY | Facility: CLINIC | Age: 51
End: 2021-04-19

## 2021-04-19 VITALS
DIASTOLIC BLOOD PRESSURE: 86 MMHG | SYSTOLIC BLOOD PRESSURE: 126 MMHG | HEART RATE: 88 BPM | HEIGHT: 63 IN | BODY MASS INDEX: 26.58 KG/M2 | OXYGEN SATURATION: 96 % | WEIGHT: 150 LBS

## 2021-04-19 VITALS — WEIGHT: 150 LBS | TEMPERATURE: 98.5 F | HEIGHT: 63 IN | BODY MASS INDEX: 26.58 KG/M2

## 2021-04-19 DIAGNOSIS — R79.89 ELEVATED LIVER FUNCTION TESTS: ICD-10-CM

## 2021-04-19 DIAGNOSIS — M25.561 RIGHT KNEE PAIN, UNSPECIFIED CHRONICITY: Primary | ICD-10-CM

## 2021-04-19 DIAGNOSIS — S76.211D INGUINAL STRAIN, RIGHT, SUBSEQUENT ENCOUNTER: ICD-10-CM

## 2021-04-19 DIAGNOSIS — I10 HYPERTENSION, UNSPECIFIED TYPE: Primary | ICD-10-CM

## 2021-04-19 DIAGNOSIS — D72.829 LEUKOCYTOSIS, UNSPECIFIED TYPE: ICD-10-CM

## 2021-04-19 DIAGNOSIS — N28.9 RENAL INSUFFICIENCY: ICD-10-CM

## 2021-04-19 PROCEDURE — 99213 OFFICE O/P EST LOW 20 MIN: CPT | Performed by: NURSE PRACTITIONER

## 2021-04-19 PROCEDURE — 20610 DRAIN/INJ JOINT/BURSA W/O US: CPT | Performed by: ORTHOPAEDIC SURGERY

## 2021-04-19 PROCEDURE — 99203 OFFICE O/P NEW LOW 30 MIN: CPT | Performed by: ORTHOPAEDIC SURGERY

## 2021-04-19 PROCEDURE — 73562 X-RAY EXAM OF KNEE 3: CPT | Performed by: ORTHOPAEDIC SURGERY

## 2021-04-19 RX ORDER — METHYLPREDNISOLONE ACETATE 80 MG/ML
80 INJECTION, SUSPENSION INTRA-ARTICULAR; INTRALESIONAL; INTRAMUSCULAR; SOFT TISSUE
Status: COMPLETED | OUTPATIENT
Start: 2021-04-19 | End: 2021-04-19

## 2021-04-19 RX ORDER — LIDOCAINE HYDROCHLORIDE 20 MG/ML
2 INJECTION, SOLUTION EPIDURAL; INFILTRATION; INTRACAUDAL; PERINEURAL
Status: COMPLETED | OUTPATIENT
Start: 2021-04-19 | End: 2021-04-19

## 2021-04-19 RX ADMIN — LIDOCAINE HYDROCHLORIDE 2 ML: 20 INJECTION, SOLUTION EPIDURAL; INFILTRATION; INTRACAUDAL; PERINEURAL at 16:14

## 2021-04-19 RX ADMIN — METHYLPREDNISOLONE ACETATE 80 MG: 80 INJECTION, SUSPENSION INTRA-ARTICULAR; INTRALESIONAL; INTRAMUSCULAR; SOFT TISSUE at 16:14

## 2021-04-19 NOTE — PATIENT INSTRUCTIONS
Discharge instructions continue present plan focus on decreasing calories decreasing sodium in your diet decrease breads Posta sweets processed foods    Change people places and things when you quit smoking,    Renal ultrasound of your arteries to rule out stenosis or secondary cause of your blood pressure we should identify.    If we still have difficulty controlling your blood pressure  You may need to see a nephrologist I suspect however yours was exacerbated by your recent hospitalization

## 2021-04-19 NOTE — PROGRESS NOTES
"Patient: Vanesa Escudero    YOB: 1970    Medical Record Number: [id number]    Chief Complaints:  Right knee pain    History of Present Illness:     51 y.o. female patient who presents for evaluation of the right knee.  The symptoms began about 6 or 7 weeks ago.  The symptoms started after she was hospitalized for kidney stones.  She does not recall any injury.  She says that her pain is typically mild to moderate, roughly 4 out of 10.  She describes it as throbbing and dull.  She has noticed associated swelling, stiffness and giving way.  She works as a  at [employer] and is on her feet quite a bit.  Her work does aggravate her knee.  Denies any catching, popping or locking.  She has noticed swelling in the back of her knee.  She has also noticed what she describes as a \"fluid collection\" along the lateral aspect of her knee.  When asked to localize her pain, she gestures to the lateral knee.  Denies other injuries or complaints.      Allergies:   Allergies   Allergen Reactions   • Bactrim [Sulfamethoxazole-Trimethoprim] Hives   • Penicillins Hives       Home Medications    Current Outpatient Medications:   •  amLODIPine (NORVASC) 10 MG tablet, Take 10 mg by mouth Daily. for blood pressure, Disp: , Rfl:   •  amphetamine-dextroamphetamine XR (ADDERALL XR) 30 MG 24 hr capsule, Take by mouth Every Morning, Disp: , Rfl:   •  cloNIDine (CATAPRES) 0.3 MG tablet, Take 0.3 mg by mouth 2 (Two) Times a Day., Disp: , Rfl:   •  hydroCHLOROthiazide (HYDRODIURIL) 12.5 MG tablet, Take 1 tablet by mouth Daily., Disp: 30 tablet, Rfl: 2  •  losartan (COZAAR) 100 MG tablet, Take 100 mg by mouth Daily., Disp: , Rfl:   •  amphetamine-dextroamphetamine (ADDERALL) 20 MG tablet, Take 1 tablet by mouth 3 (Three) Times a Day., Disp: , Rfl:   •  HYDROcodone-acetaminophen (NORCO) 5-325 MG per tablet, Take 1 tablet by mouth Every 6 (Six) Hours As Needed for Severe Pain ., Disp: 12 tablet, Rfl: 0  •  LORazepam (ATIVAN) " 0.5 MG tablet, TAKE 1 TABLET BY MOUTH AT BEDTIME AS NEEDED FOR ANXIETY AND SLEEP, Disp: , Rfl:   •  metoprolol succinate XL (Toprol XL) 100 MG 24 hr tablet, Take 1 tablet by mouth Daily. For hypertension, Disp: 30 tablet, Rfl: 2  •  nitrofurantoin (MACRODANTIN) 100 MG capsule, Take 100 mg by mouth 4 (Four) Times a Day., Disp: , Rfl:   •  tamsulosin (FLOMAX) 0.4 MG capsule 24 hr capsule, 0.4 mg., Disp: , Rfl:     Past Medical History:   Diagnosis Date   • ADHD (attention deficit hyperactivity disorder)    • Anxiety    • Hypertension    • Kidney stone        Past Surgical History:   Procedure Laterality Date   • BACK SURGERY     • FOOT SURGERY Left        Social History     Occupational History   • Not on file   Tobacco Use   • Smoking status: Current Every Day Smoker     Packs/day: 1.00     Years: 20.00     Pack years: 20.00     Types: Cigarettes   • Smokeless tobacco: Never Used   Vaping Use   • Vaping Use: Every day   • Substances: Nicotine (occasional)   Substance and Sexual Activity   • Alcohol use: Not Currently   • Drug use: Never   • Sexual activity: Yes     Partners: Male      Social History     Social History Narrative    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  GI: Denies abdominal pain, nausea, vomiting, bloody stools or diarrhea  : Denies frequency, urgency, incontinence, retention, or nocturia.  Musculoskeletal: Denies numbness,  "tingling or loss of motor function except as above  Integument: Denies rash, lesion or ulceration   Neurologic: Denies headache or focal weakness, deficits  Heme: Denies spontaneous or excessive bleeding, epistaxis, hematuria, melena, fatigue, enlarged or tender lymph nodes.      All other pertinent positives and negatives as noted above in HPI.    Physical Exam:   51 y.o. female  Vitals:    04/19/21 1540   Temp: 98.5 °F (36.9 °C)   Weight: 68 kg (150 lb)   Height: 160 cm (63\")     General:  Patient is awake and alert.  Appears in no acute distress or discomfort.    Psych:  Affect and demeanor are appropriate.    Eyes:  Conjunctiva and sclera appear grossly normal.  Eyes track well and EOM seem to be intact.    Ears:  No gross abnormalities.  Hearing adequate for the exam.    Cardiovascular:  Regular rate and rhythm.    Lungs:  Good chest expansion.  Breathing unlabored.    Spine:  Back appears grossly normal.  No palpable masses or adenopathy.  Good motion.  Straight leg raise and crossed straight leg raise maneuver are both negative for lower leg and/or knee pain.    Extremities:  Right knee is examined.  Skin is benign.  No palpable adenopathy.  She does have what seems to be a palpable Baker's cyst posteriorly.  No erythema or increased warmth.  Moderate tenderness noted over lateral joint line.  She has what I think is a parameniscal cyst along the lateral joint line as well.  There is kind of a soft fluid collection just adjacent to the anterolateral joint line.  Motion is full and symmetric to the contralateral side.  Moderate discomfort with hyperflexion.  Positive lateral Jose Francisco's test for pain.  No instability.  Strength is well preserved including hip flexion, knee extension, ankle and toe plantarflexion, ankle inversion and eversion.  Good sensory function throughout the leg and foot.  Palpable pulses.  Brisk capillary refill.  Good skin turgor.         Radiology:   Bilateral standing AP views, " bilateral merchants views and a lateral view of the right knee are ordered by myself and reviewed to evaluate the patient's complaint.  No comparison films are immediately available.  The x-rays show no acute abnormalities, lesions, masses, significant degenerative changes, malalignment or other concerning findings.    I reviewed the report of her duplex ultrasound from March 26.  The report describes a popliteal fluid collection.    Assessment/Plan:  Right Baker's cyst with suspected lateral meniscal tear    We discussed options for her.  We talked about a possible MRI.  She says that she really does not want to go down that road right now.  She is not interested in any surgical options.  For now, she is just interested in feeling better and getting back to work.  We discussed the option of a possible injection.  She liked that idea.  The risk, benefits and alternatives were discussed but she consented and the injection was performed as described below.  She will follow-up with me as needed.  I told her I will be happy to work this up further if the symptoms persist.  She can just let me know and I will refer her for the MRI.    Mello Mcdonald MD    04/19/2021    CC to Jocelyn Ardon APRN    Large Joint Arthrocentesis: R knee  Date/Time: 4/19/2021 4:14 PM  Consent given by: patient  Site marked: site marked  Timeout: Immediately prior to procedure a time out was called to verify the correct patient, procedure, equipment, support staff and site/side marked as required   Supporting Documentation  Indications: pain   Procedure Details  Location: knee - R knee  Preparation: Patient was prepped and draped in the usual sterile fashion  Needle gauge: 21G.  Approach: anterolateral  Medications administered: 2 mL lidocaine PF 2% 2 %; 80 mg methylPREDNISolone acetate 80 MG/ML  Patient tolerance: patient tolerated the procedure well with no immediate complications

## 2021-04-21 ENCOUNTER — TELEPHONE (OUTPATIENT)
Dept: ORTHOPEDIC SURGERY | Facility: CLINIC | Age: 51
End: 2021-04-21

## 2021-04-21 ENCOUNTER — OFFICE VISIT (OUTPATIENT)
Dept: ORTHOPEDIC SURGERY | Facility: CLINIC | Age: 51
End: 2021-04-21

## 2021-04-21 VITALS — WEIGHT: 150 LBS | TEMPERATURE: 97.1 F | HEIGHT: 63 IN | BODY MASS INDEX: 26.58 KG/M2

## 2021-04-21 DIAGNOSIS — M19.071 ARTHRITIS OF FIRST METATARSOPHALANGEAL (MTP) JOINT OF RIGHT FOOT: Primary | ICD-10-CM

## 2021-04-21 DIAGNOSIS — M20.41 HAMMER TOE OF RIGHT FOOT: ICD-10-CM

## 2021-04-21 DIAGNOSIS — S93.334A: ICD-10-CM

## 2021-04-21 DIAGNOSIS — R52 PAIN: ICD-10-CM

## 2021-04-21 DIAGNOSIS — M25.561 RIGHT KNEE PAIN, UNSPECIFIED CHRONICITY: ICD-10-CM

## 2021-04-21 PROCEDURE — 73630 X-RAY EXAM OF FOOT: CPT | Performed by: ORTHOPAEDIC SURGERY

## 2021-04-21 PROCEDURE — 99406 BEHAV CHNG SMOKING 3-10 MIN: CPT | Performed by: ORTHOPAEDIC SURGERY

## 2021-04-21 PROCEDURE — 99214 OFFICE O/P EST MOD 30 MIN: CPT | Performed by: ORTHOPAEDIC SURGERY

## 2021-04-21 RX ORDER — VANCOMYCIN HYDROCHLORIDE 1 G/200ML
15 INJECTION, SOLUTION INTRAVENOUS ONCE
Status: CANCELLED | OUTPATIENT
Start: 2021-05-04

## 2021-04-21 NOTE — TELEPHONE ENCOUNTER
Dr. Jackson wants to order a knee scooter for 6 months.  Please check and tell me if this is correct

## 2021-04-21 NOTE — PROGRESS NOTES
"   New Patient Complaint      Patient: Vanesa Escudero  YOB: 1970 51 y.o. female  Medical Record Number: [id number]    Chief Complaints: My foot hurts    History of Present Illness: Patient reports chronic pain in her right foot that was worsened about 4 to 5 months ago when she was taking in the garden placing plantar pressure on the right forefoot.  She has had subsequent deformity and worsening pain around the second MTP joint and chronic pain and stiffness around the first MTP joint of the right foot.    She has been seen elsewhere for her left foot and had previous surgery which has not done particularly well but main complaint is her right foot today with moderate aching pain around the first MTP joint and pain around the second toe at the MTP and PIP joints with decreased motion and no significant pain in the third fourth or fifth toes.    She works as a  at [employer] continues to smoke but is trying to use \"E cigs\"        HPI    Allergies:   Allergies   Allergen Reactions   • Bactrim [Sulfamethoxazole-Trimethoprim] Hives   • Penicillins Hives       Medications:   Current Outpatient Medications on File Prior to Visit   Medication Sig   • amLODIPine (NORVASC) 10 MG tablet Take 10 mg by mouth Daily. for blood pressure   • amphetamine-dextroamphetamine (ADDERALL) 20 MG tablet Take 1 tablet by mouth 3 (Three) Times a Day.   • amphetamine-dextroamphetamine XR (ADDERALL XR) 30 MG 24 hr capsule Take by mouth Every Morning   • cloNIDine (CATAPRES) 0.3 MG tablet Take 0.3 mg by mouth 2 (Two) Times a Day.   • hydroCHLOROthiazide (HYDRODIURIL) 12.5 MG tablet Take 1 tablet by mouth Daily.   • HYDROcodone-acetaminophen (NORCO) 5-325 MG per tablet Take 1 tablet by mouth Every 6 (Six) Hours As Needed for Severe Pain .   • LORazepam (ATIVAN) 0.5 MG tablet TAKE 1 TABLET BY MOUTH AT BEDTIME AS NEEDED FOR ANXIETY AND SLEEP   • losartan (COZAAR) 100 MG tablet Take 100 mg by mouth Daily.   • metoprolol " "succinate XL (Toprol XL) 100 MG 24 hr tablet Take 1 tablet by mouth Daily. For hypertension   • nitrofurantoin (MACRODANTIN) 100 MG capsule Take 100 mg by mouth 4 (Four) Times a Day.   • tamsulosin (FLOMAX) 0.4 MG capsule 24 hr capsule 0.4 mg.     No current facility-administered medications on file prior to visit.       Past Medical History:   Diagnosis Date   • ADHD (attention deficit hyperactivity disorder)    • Anxiety    • Hypertension    • Kidney stone      Past Surgical History:   Procedure Laterality Date   • BACK SURGERY     • FOOT SURGERY Left      Social History     Occupational History   • Not on file   Tobacco Use   • Smoking status: Current Every Day Smoker     Packs/day: 1.00     Years: 20.00     Pack years: 20.00     Types: Cigarettes   • Smokeless tobacco: Never Used   Vaping Use   • Vaping Use: Every day   • Substances: Nicotine (occasional)   Substance and Sexual Activity   • Alcohol use: Not Currently   • Drug use: Never   • Sexual activity: Yes     Partners: Male      Social History     Social History Narrative    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 All remaining systems negative except sinus pressure headaches, back pain or joint swelling    Review of Systems      Physical Exam:   Vitals:    04/21/21 1149   Temp: 97.1 °F (36.2 °C)   Weight: 68 kg (150 lb)   Height: 160 cm (63\")   PainSc:   5     Physical Exam   Constitutional: pleasant, well developed   Eyes: sclera non icteric  Hearing : adequate for exam  Cardiovascular: palpable pulses in right foot, right calf/ thigh NT without sign of DVT  Respiratoy: breathing unlabored   Neurological: grossly sensate to " LT throughout right LE  Psychiatric: oriented with normal mood and affect.   Lymphatic: No palpable popliteal lymphadenopathy right LE  Skin: intact throughout right leg/foot  Musculoskeletal: Right foot shows moderate hallux valgus deformity with crepitus on range of motion from 4 degrees dorsiflexion degrees plantarflexion of the toes not quite passively correctable.  There is cock-up deformity of the second MTP joint with dorsal subluxation/dislocation of the MTP joint which is somewhat correctable but does not seem to really reduce readily.  There is pain and semirigid hammering the PIP joint.  There was minimal if any deformity to the third toe and no tenderness to palpation  Physical Exam  Ortho Exam    Radiology: 3 views the right foot ordered evaluate pain and alignment reviewed and no prior x-rays available for comparison there is significant arthritic change with hallux valgus of the first MTP joint there is dorsal dislocation of the second MTP joint with relative elongation and some cystic change in the base of the proximal phalanx with hammering of the second toe.    Assessment/Plan: 1.  Right hallux valgus with first MTP arthritis  2.  Right second MTP joint dislocation with contracture and probable plantar plate injury none 3.  Right second hammertoe.    A long discussion regarding operative and nonoperative treatment options and for nonoperative standpoint at all likely anything is can reduce the second MTP joint and wide accommodative shoes and hammertoe splint may give her some modicum of relief briefly but not can be of any long-lasting effect.    Although no guarantees can be given she would like to proceed with operative treatment.  My recommendation given her pain and alignment would be for first MTP joint fusion with calcaneal bone graft as well as second metatarsal osteotomy in order to decompress the second MTP joint and reduce this as well as extensor tendon lengthening and joint release and  proximal interphalangeal joint resection and fusion.    She voiced a clear understanding of the operative procedure with associated risk benefits potential outcomes and complications and she will be nonweightbearing for at least 6 weeks and will return to full weightbearing for at least 3 to 4 months and will have a pain coming out of her toe for least 3 to 4 weeks.    Risk-benefit change outcomes and complications can include but not limited to heart attack stroke death pneumonia infection bleeding damage to blood vessels nerves or tendons blood clots pulmonary embolism persistent or worsening pain stiffness malunion nonunion need for subsequent surgery recurrent instability as well as very to return to presurgery and precondition levels of activity and wound healing complications and small risk of fracture or infection calcaneal bone graft harvest site.    Regarding persistent habitual smoking.  I have discussed for at least 4 minutes with the patient the ill effects of continued smoking on pulmonary and cardiovascular health as well as financial burden.  I also discussed at length the effects on peripheral circulation as well as inhibition of soft tissue and bone healing.  I've recommended that they speak with their primary care physician regarding cessation techniques.      Arrangements were made for her to get a scooter for use for least 6 weeks postoperatively and she was fitted with a hammertoe splint to use in the interim to give her some modicum of relief.    We will plan to schedule this on outpatient basis at mutually convenient time and she understands to call if she has any questions prior to surgery.

## 2021-04-26 ENCOUNTER — TRANSCRIBE ORDERS (OUTPATIENT)
Dept: PREADMISSION TESTING | Facility: HOSPITAL | Age: 51
End: 2021-04-26

## 2021-04-26 DIAGNOSIS — Z01.818 OTHER SPECIFIED PRE-OPERATIVE EXAMINATION: Primary | ICD-10-CM

## 2021-04-27 ENCOUNTER — HOSPITAL ENCOUNTER (OUTPATIENT)
Dept: CARDIOLOGY | Facility: HOSPITAL | Age: 51
Discharge: HOME | End: 2021-04-27
Admitting: NURSE PRACTITIONER

## 2021-04-27 LAB
BH CV ECHO MEAS - DIST REN A EDV LEFT: 29.2 CM/SEC
BH CV ECHO MEAS - DIST REN A PSV LEFT: 122 CM/SEC
BH CV ECHO MEAS - DIST REN A RI LEFT: 0.76
BH CV ECHO MEAS - MID REN A EDV LEFT: 25 CM/SEC
BH CV ECHO MEAS - MID REN A PSV LEFT: 96 CM/SEC
BH CV ECHO MEAS - MID REN A RI LEFT: 0.83
BH CV ECHO MEAS - PROX REN A EDV LEFT: 28 CM/SEC
BH CV ECHO MEAS - PROX REN A PSV LEFT: 117 CM/SEC
BH CV ECHO MEAS - PROX REN A RI LEFT: 0.76
BH CV VAS BP LEFT ARM: NORMAL MMHG
BH CV VAS BP RIGHT ARM: NORMAL MMHG
BH CV VAS RENAL AORTIC MID EDV: 13 CM/S
BH CV VAS RENAL AORTIC MID PSV: 67 CM/S
BH CV VAS RENAL HILUM LEFT EDV: 10 CM/S
BH CV VAS RENAL HILUM LEFT PSV: 42 CM/S
BH CV VAS RENAL HILUM RIGHT EDV: 11 CM/S
BH CV VAS RENAL HILUM RIGHT PSV: 42 CM/S
BH CV XLRA MEAS - KID L LEFT: 10 CM
BH CV XLRA MEAS - RENAL A ORG RI LEFT: 0.8
BH CV XLRA MEAS DIST REN A EDV RIGHT: 20.5 CM/SEC
BH CV XLRA MEAS DIST REN A PSV RIGHT: 82.6 CM/SEC
BH CV XLRA MEAS DIST REN A RI RIGHT: 0.75
BH CV XLRA MEAS KID L RIGHT: 10.1 CM
BH CV XLRA MEAS KID W RIGHT: 4.1 CM
BH CV XLRA MEAS MID REN A EDV RIGHT: 24.1 CM/SEC
BH CV XLRA MEAS MID REN A PSV RIGHT: 106 CM/SEC
BH CV XLRA MEAS MID REN A RI RIGHT: 0.77
BH CV XLRA MEAS PROX REN A EDV RIGHT: 38.1 CM/SEC
BH CV XLRA MEAS PROX REN A PSV RIGHT: 150 CM/SEC
BH CV XLRA MEAS PROX REN A RI RIGHT: 0.75
BH CV XLRA MEAS RAR LEFT: 1.75
BH CV XLRA MEAS RAR RIGHT: 2.36
BH CV XLRA MEAS RENAL A ORG EDV LEFT: -15.8 CM/SEC
BH CV XLRA MEAS RENAL A ORG EDV RIGHT: 40.5 CM/SEC
BH CV XLRA MEAS RENAL A ORG PSV LEFT: -77.2 CM/SEC
BH CV XLRA MEAS RENAL A ORG PSV RIGHT: 158 CM/SEC
BH CV XLRA MEAS RENAL A ORG RI RIGHT: 0.74
LEFT KIDNEY WIDTH: 5.1 CM
LEFT RENAL UPPER PARENCHYMA MAX: 20 CM/S
LEFT RENAL UPPER PARENCHYMA MIN: 7 CM/S
LEFT RENAL UPPER PARENCHYMA RI: 0.65
RIGHT RENAL UPPER PARENCHYMA MAX: 21 CM/S
RIGHT RENAL UPPER PARENCHYMA MIN: 6 CM/S
RIGHT RENAL UPPER PARENCHYMA RI: 0.71

## 2021-04-27 PROCEDURE — 93975 VASCULAR STUDY: CPT

## 2021-04-27 NOTE — PROGRESS NOTES
"Chief Complaint  blood pressure follow-up    Subjective          Vanesa Escudero presents to [facility]  Follow-up essential hypertension blood pressure better controlled today 126/86 no chest pain shortness of breath right groin strain after working out inguinal ligaments, much better much better decrease pain not bothering a whole lot now able to walk much better no chest pain shortness of breath abdominal pain mild renal insufficiency on recent labs will need to repeat and some elevated liver test      Objective   Vital Signs:   /86   Pulse 88   Ht 160 cm (63\")   Wt 68 kg (150 lb)   SpO2 96%   BMI 26.57 kg/m²     Physical Exam  Vitals reviewed.   Constitutional:       Appearance: She is well-developed.   HENT:      Head: Normocephalic.      Nose: Nose normal.   Eyes:      General: No scleral icterus.     Conjunctiva/sclera: Conjunctivae normal.      Pupils: Pupils are equal, round, and reactive to light.   Neck:      Thyroid: No thyromegaly.      Vascular: No JVD.   Cardiovascular:      Rate and Rhythm: Normal rate and regular rhythm.      Heart sounds: Normal heart sounds. No murmur heard.   No friction rub. No gallop.    Pulmonary:      Effort: Pulmonary effort is normal. No respiratory distress.      Breath sounds: Normal breath sounds. No stridor. No wheezing or rales.   Abdominal:      General: Bowel sounds are normal. There is no distension.      Palpations: Abdomen is soft.      Tenderness: There is no abdominal tenderness.      Comments: No hepatosplenomegaly, no ascites,   Musculoskeletal:         General: No tenderness.      Cervical back: Neck supple.   Lymphadenopathy:      Cervical: No cervical adenopathy.   Skin:     General: Skin is warm and dry.      Findings: No erythema or rash.   Neurological:      Mental Status: She is alert and oriented to person, place, and time.      Deep Tendon Reflexes: Reflexes are normal and symmetric.   Psychiatric:         " Behavior: Behavior normal.         Thought Content: Thought content normal.         Judgment: Judgment normal.        Result Review :                 Assessment and Plan    Diagnoses and all orders for this visit:    1. Hypertension, unspecified type (Primary)  -     Comprehensive Metabolic Panel; Future  -     CBC & Differential; Future    2. Inguinal strain, right, subsequent encounter    3. Renal insufficiency  -     Comprehensive Metabolic Panel; Future  -     CBC & Differential; Future    4. Elevated liver function tests  -     Comprehensive Metabolic Panel; Future  -     CBC & Differential; Future    5. Leukocytosis, unspecified type  -     Comprehensive Metabolic Panel; Future  -     CBC & Differential; Future        Follow Up   No follow-ups on file.  Patient was given instructions and counseling regarding her condition or for health maintenance advice. Please see specific information pulled into the AVS if appropriate.     Continue healthy diet regular exercise, 64 ounces of water daily avoid any anti-inflammatories renal insufficiency  She will follow up with Jocelyn Ardon check blood pressure weekly should average less than 130/80  She may need ultrasound of her kidney function is impaired and further evaluation

## 2021-04-30 ENCOUNTER — PRE-ADMISSION TESTING (OUTPATIENT)
Dept: PREADMISSION TESTING | Facility: HOSPITAL | Age: 51
End: 2021-04-30

## 2021-04-30 VITALS
OXYGEN SATURATION: 100 % | DIASTOLIC BLOOD PRESSURE: 85 MMHG | BODY MASS INDEX: 25.52 KG/M2 | HEART RATE: 61 BPM | HEIGHT: 63 IN | SYSTOLIC BLOOD PRESSURE: 130 MMHG | RESPIRATION RATE: 16 BRPM | TEMPERATURE: 98.2 F | WEIGHT: 144 LBS

## 2021-04-30 LAB
ANION GAP SERPL CALCULATED.3IONS-SCNC: 9.1 MMOL/L (ref 5–15)
BUN SERPL-MCNC: 21 MG/DL (ref 6–20)
BUN/CREAT SERPL: 20.4 (ref 7–25)
CALCIUM SPEC-SCNC: 9.2 MG/DL (ref 8.6–10.5)
CHLORIDE SERPL-SCNC: 109 MMOL/L (ref 98–107)
CO2 SERPL-SCNC: 21.9 MMOL/L (ref 22–29)
CREAT SERPL-MCNC: 1.03 MG/DL (ref 0.57–1)
DEPRECATED RDW RBC AUTO: 43.3 FL (ref 37–54)
ERYTHROCYTE [DISTWIDTH] IN BLOOD BY AUTOMATED COUNT: 14.2 % (ref 12.3–15.4)
GFR SERPL CREATININE-BSD FRML MDRD: 57 ML/MIN/1.73
GFR SERPL CREATININE-BSD FRML MDRD: 69 ML/MIN/1.73
GLUCOSE SERPL-MCNC: 107 MG/DL (ref 65–99)
HCG SERPL QL: NEGATIVE
HCT VFR BLD AUTO: 35 % (ref 34–46.6)
HGB BLD-MCNC: 12 G/DL (ref 12–15.9)
MCH RBC QN AUTO: 29.1 PG (ref 26.6–33)
MCHC RBC AUTO-ENTMCNC: 34.3 G/DL (ref 31.5–35.7)
MCV RBC AUTO: 84.7 FL (ref 79–97)
PLATELET # BLD AUTO: 257 10*3/MM3 (ref 140–450)
PMV BLD AUTO: 10.5 FL (ref 6–12)
POTASSIUM SERPL-SCNC: 3.9 MMOL/L (ref 3.5–5.2)
QT INTERVAL: 433 MS
RBC # BLD AUTO: 4.13 10*6/MM3 (ref 3.77–5.28)
SODIUM SERPL-SCNC: 140 MMOL/L (ref 136–145)
WBC # BLD AUTO: 10.53 10*3/MM3 (ref 3.4–10.8)

## 2021-04-30 PROCEDURE — 80048 BASIC METABOLIC PNL TOTAL CA: CPT

## 2021-04-30 PROCEDURE — 93005 ELECTROCARDIOGRAM TRACING: CPT

## 2021-04-30 PROCEDURE — 36415 COLL VENOUS BLD VENIPUNCTURE: CPT

## 2021-04-30 PROCEDURE — 85027 COMPLETE CBC AUTOMATED: CPT

## 2021-04-30 PROCEDURE — 84703 CHORIONIC GONADOTROPIN ASSAY: CPT

## 2021-04-30 PROCEDURE — 93010 ELECTROCARDIOGRAM REPORT: CPT | Performed by: INTERNAL MEDICINE

## 2021-04-30 RX ORDER — AMLODIPINE BESYLATE 10 MG/1
10 TABLET ORAL DAILY
Qty: 90 TABLET | Refills: 1 | Status: SHIPPED | OUTPATIENT
Start: 2021-04-30 | End: 2022-03-16 | Stop reason: SDUPTHER

## 2021-05-01 ENCOUNTER — LAB (OUTPATIENT)
Dept: LAB | Facility: HOSPITAL | Age: 51
End: 2021-05-01

## 2021-05-01 DIAGNOSIS — Z01.818 OTHER SPECIFIED PRE-OPERATIVE EXAMINATION: ICD-10-CM

## 2021-05-01 PROCEDURE — U0004 COV-19 TEST NON-CDC HGH THRU: HCPCS

## 2021-05-01 PROCEDURE — C9803 HOPD COVID-19 SPEC COLLECT: HCPCS

## 2021-05-03 LAB — SARS-COV-2 RNA RESP QL NAA+PROBE: NOT DETECTED

## 2021-05-04 ENCOUNTER — APPOINTMENT (OUTPATIENT)
Dept: GENERAL RADIOLOGY | Facility: HOSPITAL | Age: 51
End: 2021-05-04

## 2021-05-04 ENCOUNTER — ANESTHESIA EVENT (OUTPATIENT)
Dept: PERIOP | Facility: HOSPITAL | Age: 51
End: 2021-05-04

## 2021-05-04 ENCOUNTER — HOSPITAL ENCOUNTER (OUTPATIENT)
Facility: HOSPITAL | Age: 51
Setting detail: HOSPITAL OUTPATIENT SURGERY
Discharge: HOME | End: 2021-05-04
Attending: ORTHOPAEDIC SURGERY | Admitting: ORTHOPAEDIC SURGERY

## 2021-05-04 ENCOUNTER — ANESTHESIA (OUTPATIENT)
Dept: PERIOP | Facility: HOSPITAL | Age: 51
End: 2021-05-04

## 2021-05-04 VITALS
OXYGEN SATURATION: 99 % | DIASTOLIC BLOOD PRESSURE: 92 MMHG | RESPIRATION RATE: 16 BRPM | TEMPERATURE: 98.7 F | SYSTOLIC BLOOD PRESSURE: 136 MMHG | HEART RATE: 69 BPM

## 2021-05-04 DIAGNOSIS — M20.41 HAMMER TOE OF RIGHT FOOT: ICD-10-CM

## 2021-05-04 DIAGNOSIS — M19.071 ARTHRITIS OF FIRST METATARSOPHALANGEAL (MTP) JOINT OF RIGHT FOOT: ICD-10-CM

## 2021-05-04 DIAGNOSIS — S93.334A: ICD-10-CM

## 2021-05-04 PROCEDURE — 76000 FLUOROSCOPY <1 HR PHYS/QHP: CPT

## 2021-05-04 PROCEDURE — 25010000002 VANCOMYCIN PER 500 MG: Performed by: ORTHOPAEDIC SURGERY

## 2021-05-04 PROCEDURE — C1713 ANCHOR/SCREW BN/BN,TIS/BN: HCPCS | Performed by: ORTHOPAEDIC SURGERY

## 2021-05-04 PROCEDURE — 73630 X-RAY EXAM OF FOOT: CPT

## 2021-05-04 PROCEDURE — 25010000002 FENTANYL CITRATE (PF) 100 MCG/2ML SOLUTION: Performed by: NURSE ANESTHETIST, CERTIFIED REGISTERED

## 2021-05-04 PROCEDURE — C1769 GUIDE WIRE: HCPCS | Performed by: ORTHOPAEDIC SURGERY

## 2021-05-04 PROCEDURE — 25010000002 FENTANYL CITRATE (PF) 100 MCG/2ML SOLUTION: Performed by: ANESTHESIOLOGY

## 2021-05-04 PROCEDURE — C1713 ANCHOR/SCREW BN/BN,TIS/BN: HCPCS

## 2021-05-04 PROCEDURE — 76942 ECHO GUIDE FOR BIOPSY: CPT | Performed by: ORTHOPAEDIC SURGERY

## 2021-05-04 PROCEDURE — 25010000002 DEXAMETHASONE PER 1 MG: Performed by: NURSE ANESTHETIST, CERTIFIED REGISTERED

## 2021-05-04 PROCEDURE — 25010000002 DEXAMETHASONE PER 1 MG: Performed by: ANESTHESIOLOGY

## 2021-05-04 PROCEDURE — 28750 FUSION OF BIG TOE JOINT: CPT | Performed by: ORTHOPAEDIC SURGERY

## 2021-05-04 PROCEDURE — 28308 INCISION OF METATARSAL: CPT | Performed by: ORTHOPAEDIC SURGERY

## 2021-05-04 PROCEDURE — 20902 REMOVAL OF BONE FOR GRAFT: CPT | Performed by: ORTHOPAEDIC SURGERY

## 2021-05-04 PROCEDURE — 25010000002 HYDROMORPHONE PER 4 MG: Performed by: ANESTHESIOLOGY

## 2021-05-04 PROCEDURE — 25010000002 PROPOFOL 10 MG/ML EMULSION: Performed by: NURSE ANESTHETIST, CERTIFIED REGISTERED

## 2021-05-04 PROCEDURE — 28645 REPAIR TOE DISLOCATION: CPT | Performed by: ORTHOPAEDIC SURGERY

## 2021-05-04 PROCEDURE — 25010000002 ONDANSETRON PER 1 MG: Performed by: NURSE ANESTHETIST, CERTIFIED REGISTERED

## 2021-05-04 PROCEDURE — 28285 REPAIR OF HAMMERTOE: CPT | Performed by: ORTHOPAEDIC SURGERY

## 2021-05-04 PROCEDURE — 25010000002 HYDROMORPHONE PER 4 MG: Performed by: NURSE ANESTHETIST, CERTIFIED REGISTERED

## 2021-05-04 PROCEDURE — 25010000002 MIDAZOLAM PER 1 MG: Performed by: ANESTHESIOLOGY

## 2021-05-04 DEVICE — SCRW VA TI 3X14MM: Type: IMPLANTABLE DEVICE | Site: TOE FIRST | Status: FUNCTIONAL

## 2021-05-04 DEVICE — SCRW COMPR NOHD FUL/THRD TI MICRO 2.5X24MM: Type: IMPLANTABLE DEVICE | Site: TOE FIRST | Status: FUNCTIONAL

## 2021-05-04 DEVICE — SCRW CORT FT/ANKL L/P TI 3X16MM: Type: IMPLANTABLE DEVICE | Site: TOE FIRST | Status: FUNCTIONAL

## 2021-05-04 DEVICE — IMPLANTABLE DEVICE: Type: IMPLANTABLE DEVICE | Site: TOE FIRST | Status: FUNCTIONAL

## 2021-05-04 DEVICE — SCRW VA TI 3X16MM: Type: IMPLANTABLE DEVICE | Site: TOE FIRST | Status: FUNCTIONAL

## 2021-05-04 RX ORDER — ONDANSETRON 2 MG/ML
INJECTION INTRAMUSCULAR; INTRAVENOUS AS NEEDED
Status: DISCONTINUED | OUTPATIENT
Start: 2021-05-04 | End: 2021-05-04 | Stop reason: SURG

## 2021-05-04 RX ORDER — VANCOMYCIN HYDROCHLORIDE 1 G/200ML
15 INJECTION, SOLUTION INTRAVENOUS ONCE
Status: COMPLETED | OUTPATIENT
Start: 2021-05-04 | End: 2021-05-04

## 2021-05-04 RX ORDER — PROPOFOL 10 MG/ML
VIAL (ML) INTRAVENOUS AS NEEDED
Status: DISCONTINUED | OUTPATIENT
Start: 2021-05-04 | End: 2021-05-04 | Stop reason: SURG

## 2021-05-04 RX ORDER — HYDROMORPHONE HCL 110MG/55ML
PATIENT CONTROLLED ANALGESIA SYRINGE INTRAVENOUS AS NEEDED
Status: DISCONTINUED | OUTPATIENT
Start: 2021-05-04 | End: 2021-05-04 | Stop reason: SURG

## 2021-05-04 RX ORDER — FLUMAZENIL 0.1 MG/ML
0.2 INJECTION INTRAVENOUS AS NEEDED
Status: DISCONTINUED | OUTPATIENT
Start: 2021-05-04 | End: 2021-05-04 | Stop reason: HOSPADM

## 2021-05-04 RX ORDER — DEXAMETHASONE SODIUM PHOSPHATE 4 MG/ML
INJECTION, SOLUTION INTRA-ARTICULAR; INTRALESIONAL; INTRAMUSCULAR; INTRAVENOUS; SOFT TISSUE
Status: COMPLETED | OUTPATIENT
Start: 2021-05-04 | End: 2021-05-04

## 2021-05-04 RX ORDER — EPHEDRINE SULFATE 50 MG/ML
5 INJECTION, SOLUTION INTRAVENOUS ONCE AS NEEDED
Status: DISCONTINUED | OUTPATIENT
Start: 2021-05-04 | End: 2021-05-04 | Stop reason: HOSPADM

## 2021-05-04 RX ORDER — MIDAZOLAM HYDROCHLORIDE 1 MG/ML
2 INJECTION INTRAMUSCULAR; INTRAVENOUS
Status: DISCONTINUED | OUTPATIENT
Start: 2021-05-04 | End: 2021-05-04 | Stop reason: HOSPADM

## 2021-05-04 RX ORDER — DEXAMETHASONE SODIUM PHOSPHATE 10 MG/ML
INJECTION INTRAMUSCULAR; INTRAVENOUS AS NEEDED
Status: DISCONTINUED | OUTPATIENT
Start: 2021-05-04 | End: 2021-05-04 | Stop reason: SURG

## 2021-05-04 RX ORDER — OXYCODONE AND ACETAMINOPHEN 7.5; 325 MG/1; MG/1
1 TABLET ORAL ONCE AS NEEDED
Status: DISCONTINUED | OUTPATIENT
Start: 2021-05-04 | End: 2021-05-04 | Stop reason: HOSPADM

## 2021-05-04 RX ORDER — FENTANYL CITRATE 50 UG/ML
INJECTION, SOLUTION INTRAMUSCULAR; INTRAVENOUS AS NEEDED
Status: DISCONTINUED | OUTPATIENT
Start: 2021-05-04 | End: 2021-05-04 | Stop reason: SURG

## 2021-05-04 RX ORDER — FENTANYL CITRATE 50 UG/ML
100 INJECTION, SOLUTION INTRAMUSCULAR; INTRAVENOUS
Status: DISCONTINUED | OUTPATIENT
Start: 2021-05-04 | End: 2021-05-04 | Stop reason: HOSPADM

## 2021-05-04 RX ORDER — ONDANSETRON 2 MG/ML
4 INJECTION INTRAMUSCULAR; INTRAVENOUS ONCE AS NEEDED
Status: DISCONTINUED | OUTPATIENT
Start: 2021-05-04 | End: 2021-05-04 | Stop reason: HOSPADM

## 2021-05-04 RX ORDER — SODIUM CHLORIDE 0.9 % (FLUSH) 0.9 %
3 SYRINGE (ML) INJECTION EVERY 12 HOURS SCHEDULED
Status: DISCONTINUED | OUTPATIENT
Start: 2021-05-04 | End: 2021-05-04 | Stop reason: HOSPADM

## 2021-05-04 RX ORDER — FENTANYL CITRATE 50 UG/ML
50 INJECTION, SOLUTION INTRAMUSCULAR; INTRAVENOUS
Status: DISCONTINUED | OUTPATIENT
Start: 2021-05-04 | End: 2021-05-04 | Stop reason: HOSPADM

## 2021-05-04 RX ORDER — BUPIVACAINE HYDROCHLORIDE 2.5 MG/ML
INJECTION, SOLUTION EPIDURAL; INFILTRATION; INTRACAUDAL
Status: COMPLETED | OUTPATIENT
Start: 2021-05-04 | End: 2021-05-04

## 2021-05-04 RX ORDER — HYDROCODONE BITARTRATE AND ACETAMINOPHEN 7.5; 325 MG/1; MG/1
1 TABLET ORAL ONCE AS NEEDED
Status: DISCONTINUED | OUTPATIENT
Start: 2021-05-04 | End: 2021-05-04 | Stop reason: HOSPADM

## 2021-05-04 RX ORDER — OXYCODONE HYDROCHLORIDE AND ACETAMINOPHEN 5; 325 MG/1; MG/1
1 TABLET ORAL ONCE AS NEEDED
Status: DISCONTINUED | OUTPATIENT
Start: 2021-05-04 | End: 2021-05-04 | Stop reason: HOSPADM

## 2021-05-04 RX ORDER — HYDROMORPHONE HYDROCHLORIDE 1 MG/ML
0.5 INJECTION, SOLUTION INTRAMUSCULAR; INTRAVENOUS; SUBCUTANEOUS
Status: DISCONTINUED | OUTPATIENT
Start: 2021-05-04 | End: 2021-05-04 | Stop reason: HOSPADM

## 2021-05-04 RX ORDER — CLINDAMYCIN HYDROCHLORIDE 150 MG/1
150 CAPSULE ORAL 3 TIMES DAILY
Qty: 6 CAPSULE | Refills: 0 | Status: SHIPPED | OUTPATIENT
Start: 2021-05-04 | End: 2021-05-06

## 2021-05-04 RX ORDER — LIDOCAINE HYDROCHLORIDE 20 MG/ML
INJECTION, SOLUTION INFILTRATION; PERINEURAL AS NEEDED
Status: DISCONTINUED | OUTPATIENT
Start: 2021-05-04 | End: 2021-05-04 | Stop reason: SURG

## 2021-05-04 RX ORDER — SODIUM CHLORIDE 0.9 % (FLUSH) 0.9 %
3-10 SYRINGE (ML) INJECTION AS NEEDED
Status: DISCONTINUED | OUTPATIENT
Start: 2021-05-04 | End: 2021-05-04 | Stop reason: HOSPADM

## 2021-05-04 RX ORDER — MIDAZOLAM HYDROCHLORIDE 1 MG/ML
1 INJECTION INTRAMUSCULAR; INTRAVENOUS
Status: DISCONTINUED | OUTPATIENT
Start: 2021-05-04 | End: 2021-05-04 | Stop reason: HOSPADM

## 2021-05-04 RX ORDER — OXYCODONE HYDROCHLORIDE AND ACETAMINOPHEN 5; 325 MG/1; MG/1
1-2 TABLET ORAL EVERY 4 HOURS PRN
Qty: 50 TABLET | Refills: 0 | Status: SHIPPED | OUTPATIENT
Start: 2021-05-04 | End: 2021-05-06 | Stop reason: ALTCHOICE

## 2021-05-04 RX ORDER — BUPIVACAINE HYDROCHLORIDE 5 MG/ML
INJECTION, SOLUTION EPIDURAL; INTRACAUDAL
Status: COMPLETED | OUTPATIENT
Start: 2021-05-04 | End: 2021-05-04

## 2021-05-04 RX ORDER — SODIUM CHLORIDE, SODIUM LACTATE, POTASSIUM CHLORIDE, CALCIUM CHLORIDE 600; 310; 30; 20 MG/100ML; MG/100ML; MG/100ML; MG/100ML
9 INJECTION, SOLUTION INTRAVENOUS CONTINUOUS
Status: DISCONTINUED | OUTPATIENT
Start: 2021-05-04 | End: 2021-05-04 | Stop reason: HOSPADM

## 2021-05-04 RX ORDER — LIDOCAINE HYDROCHLORIDE 10 MG/ML
0.5 INJECTION, SOLUTION EPIDURAL; INFILTRATION; INTRACAUDAL; PERINEURAL ONCE AS NEEDED
Status: COMPLETED | OUTPATIENT
Start: 2021-05-04 | End: 2021-05-04

## 2021-05-04 RX ADMIN — MIDAZOLAM 2 MG: 1 INJECTION INTRAMUSCULAR; INTRAVENOUS at 07:05

## 2021-05-04 RX ADMIN — FENTANYL CITRATE 100 MCG: 50 INJECTION INTRAMUSCULAR; INTRAVENOUS at 07:40

## 2021-05-04 RX ADMIN — LIDOCAINE HYDROCHLORIDE 0.5 ML: 10 INJECTION, SOLUTION EPIDURAL; INFILTRATION; INTRACAUDAL; PERINEURAL at 06:40

## 2021-05-04 RX ADMIN — HYDROMORPHONE HYDROCHLORIDE 0.5 MG: 1 INJECTION, SOLUTION INTRAMUSCULAR; INTRAVENOUS; SUBCUTANEOUS at 11:54

## 2021-05-04 RX ADMIN — FENTANYL CITRATE 50 MCG: 50 INJECTION, SOLUTION INTRAMUSCULAR; INTRAVENOUS at 11:33

## 2021-05-04 RX ADMIN — LIDOCAINE HYDROCHLORIDE 100 MG: 20 INJECTION, SOLUTION INFILTRATION; PERINEURAL at 07:40

## 2021-05-04 RX ADMIN — SODIUM CHLORIDE, POTASSIUM CHLORIDE, SODIUM LACTATE AND CALCIUM CHLORIDE 9 ML/HR: 600; 310; 30; 20 INJECTION, SOLUTION INTRAVENOUS at 06:40

## 2021-05-04 RX ADMIN — ONDANSETRON 4 MG: 2 INJECTION INTRAMUSCULAR; INTRAVENOUS at 10:39

## 2021-05-04 RX ADMIN — BUPIVACAINE HYDROCHLORIDE 20 ML: 5 INJECTION, SOLUTION EPIDURAL; INTRACAUDAL; PERINEURAL at 07:13

## 2021-05-04 RX ADMIN — HYDROMORPHONE HYDROCHLORIDE 0.5 MG: 2 INJECTION, SOLUTION INTRAMUSCULAR; INTRAVENOUS; SUBCUTANEOUS at 09:30

## 2021-05-04 RX ADMIN — DEXAMETHASONE SODIUM PHOSPHATE 4 MG: 4 INJECTION, SOLUTION INTRAMUSCULAR; INTRAVENOUS at 07:13

## 2021-05-04 RX ADMIN — HYDROMORPHONE HYDROCHLORIDE 0.5 MG: 2 INJECTION, SOLUTION INTRAMUSCULAR; INTRAVENOUS; SUBCUTANEOUS at 08:53

## 2021-05-04 RX ADMIN — FENTANYL CITRATE 100 MCG: 50 INJECTION, SOLUTION INTRAMUSCULAR; INTRAVENOUS at 07:05

## 2021-05-04 RX ADMIN — OXYCODONE HYDROCHLORIDE AND ACETAMINOPHEN 1 TABLET: 5; 325 TABLET ORAL at 12:00

## 2021-05-04 RX ADMIN — BUPIVACAINE HYDROCHLORIDE 10 ML: 2.5 INJECTION, SOLUTION EPIDURAL; INFILTRATION; INTRACAUDAL; PERINEURAL at 07:13

## 2021-05-04 RX ADMIN — VANCOMYCIN HYDROCHLORIDE 1000 MG: 1 INJECTION, SOLUTION INTRAVENOUS at 06:49

## 2021-05-04 RX ADMIN — SODIUM CHLORIDE, POTASSIUM CHLORIDE, SODIUM LACTATE AND CALCIUM CHLORIDE: 600; 310; 30; 20 INJECTION, SOLUTION INTRAVENOUS at 10:38

## 2021-05-04 RX ADMIN — DEXAMETHASONE SODIUM PHOSPHATE 8 MG: 10 INJECTION INTRAMUSCULAR; INTRAVENOUS at 08:04

## 2021-05-04 RX ADMIN — FENTANYL CITRATE 50 MCG: 50 INJECTION, SOLUTION INTRAMUSCULAR; INTRAVENOUS at 11:44

## 2021-05-04 RX ADMIN — PROPOFOL 250 MG: 10 INJECTION, EMULSION INTRAVENOUS at 07:40

## 2021-05-04 NOTE — ANESTHESIA PREPROCEDURE EVALUATION
Anesthesia Evaluation     Patient summary reviewed and Nursing notes reviewed   NPO Solid Status: > 8 hours             Airway   Mallampati: II  TM distance: >3 FB  Neck ROM: full  no difficulty expected  Dental - normal exam     Pulmonary - negative pulmonary ROS and normal exam   Cardiovascular - normal exam    (+) hypertension,       Neuro/Psych  (+) psychiatric history ADHD and Anxiety,     GI/Hepatic/Renal/Endo - negative ROS     Musculoskeletal (-) negative ROS    Abdominal  - normal exam   Substance History - negative use     OB/GYN negative ob/gyn ROS         Other                        Anesthesia Plan    ASA 2     general with block     intravenous induction     Anesthetic plan, all risks, benefits, and alternatives have been provided, discussed and informed consent has been obtained with: patient.    Plan discussed with CRNA.

## 2021-05-04 NOTE — ANESTHESIA PROCEDURE NOTES
Peripheral Block    Pre-sedation assessment completed: 5/4/2021 6:58 AM    Patient reassessed immediately prior to procedure    Patient location during procedure: pre-op  Start time: 5/4/2021 6:58 AM  Stop time: 5/4/2021 7:13 AM  Reason for block: at surgeon's request and post-op pain management  Performed by  Anesthesiologist: Isak Weems MD  Preanesthetic Checklist  Completed: patient identified, IV checked, site marked, risks and benefits discussed, surgical consent, monitors and equipment checked, pre-op evaluation and timeout performed  Prep:  Pt Position: supine  Sterile barriers:cap, gloves, mask and sterile barriers  Prep: ChloraPrep  Patient monitoring: blood pressure monitoring, continuous pulse oximetry and EKG  Procedure  Sedation:yes  Performed under: local infiltration  Guidance:ultrasound guided  ULTRASOUND INTERPRETATION.  Using ultrasound guidance a 22 G gauge needle was placed in close proximity to the femoral and sciatic nerve, at which point, under ultrasound guidance anesthetic was injected in the area of the nerve and spread of the anesthesia was seen on ultrasound in close proximity thereto.  There were no abnormalities seen on ultrasound; a digital image was taken; and the patient tolerated the procedure with no complications. Images:still images obtained    Laterality:right  Block Type:adductor canal block, popliteal and sciatic  Injection Technique:single-shot  Needle Type:echogenic  Needle Gauge:22 G  Resistance on Injection: less than 15 psi    Medications Used: dexamethasone (DECADRON) injection, 4 mg  bupivacaine PF (MARCAINE) 0.25 % injection, 10 mL  bupivacaine (PF) (MARCAINE) 0.5 % injection, 20 mL  Med admintered at 5/4/2021 7:13 AM      Post Assessment  Injection Assessment: negative aspiration for heme, no paresthesia on injection and incremental injection  Patient Tolerance:comfortable throughout block  Complications:no  Additional Notes

## 2021-05-04 NOTE — ANESTHESIA POSTPROCEDURE EVALUATION
Patient: Vanesa Escudero    Procedure Summary     Date: 05/04/21 Room / Location: [facility]  /  [facility]    Anesthesia Start: 0730 Anesthesia Stop: 1128    Procedure: Right first metatarsophalangeal joint fusion with calcaneal bone graft and right second metatarsal phalangeal joint release with open reduction and metatarsal osteotomy.  Right second proximal interphalangeal joint fusion (Right Toes) Diagnosis:       Arthritis of first metatarsophalangeal (MTP) joint of right foot      Hammer toe of right foot      Closed dislocation of metatarsal joint of right foot, initial encounter      (Arthritis of first metatarsophalangeal (MTP) joint of right foot [M19.071])      (Hammer toe of right foot [M20.41])      (Closed dislocation of metatarsal joint of right foot, initial encounter [S93.334A])    Surgeons: Monroe Jackson MD Provider: Isak Weems MD    Anesthesia Type: general with block ASA Status: 2          Anesthesia Type: general with block    Vitals  Vitals Value Taken Time   /87 05/04/21 1200   Temp 37.1 °C (98.7 °F) 05/04/21 1200   Pulse 71 05/04/21 1208   Resp 16 05/04/21 1200   SpO2 96 % 05/04/21 1208   Vitals shown include unvalidated device data.        Post Anesthesia Care and Evaluation    Patient location during evaluation: bedside  Patient participation: complete - patient participated  Level of consciousness: awake  Pain management: adequate  Airway patency: patent  Anesthetic complications: No anesthetic complications  PONV Status: controlled  Cardiovascular status: acceptable  Respiratory status: acceptable  Hydration status: acceptable    Comments: --------------------            05/04/21               1213     --------------------   BP:       136/92     Pulse:      69       Resp:       16       Temp:                SpO2:      99%      --------------------

## 2021-05-06 DIAGNOSIS — M19.071 ARTHRITIS OF FIRST METATARSOPHALANGEAL (MTP) JOINT OF RIGHT FOOT: Primary | ICD-10-CM

## 2021-05-06 RX ORDER — HYDROCODONE BITARTRATE AND ACETAMINOPHEN 7.5; 325 MG/1; MG/1
TABLET ORAL
Qty: 42 TABLET | Refills: 0 | Status: SHIPPED | OUTPATIENT
Start: 2021-05-06 | End: 2021-05-10 | Stop reason: SDUPTHER

## 2021-05-06 NOTE — TELEPHONE ENCOUNTER
Spoke with patients boyfriend and he thinks that the ACE wrap on her foot is too tight. Instructed him to loosen it but not to remove the dressing. She can up the oxycodone to 3 every 4 hours if needed. Continue ice, elevation, and tylenol as well for pain control. Instructed to avoid NSAIDs for now. They will call back with any further concerns.

## 2021-05-06 NOTE — TELEPHONE ENCOUNTER
Patient called back stating that taking 3 percocets is still not helping with her pain. Please advise

## 2021-05-06 NOTE — TELEPHONE ENCOUNTER
CLAIRE out of office  Patient had right foot sx on 5/4 and stated that the block has worn off and she is in a lot of pain. She is taking two oxycodone 5mg every 4 hours, elevating and using ice and is still in extreme pain. Please advise.

## 2021-05-06 NOTE — TELEPHONE ENCOUNTER
Call returned to the patient.  Toes are warm and pink.  Capillary refill is brisk.  She denies that the dressing is too tight.  After talking to the patient for a lengthy time she his planing of some burning pain that is probably more incisional but remains afebrile.  Her dressing is dry and intact.  She did not start taking her pain medicine until her nerve block wore started wearing off which could account for the increased pain.  She has been taking 3 Percocet fives every 4 hours with essentially no relief.  My concern is that may be too much Tylenol at one time.  I discussed with RBB and will send in a prescription for hydrocodone 7.5 for her to try.  Patient understands that if the hydrocodone does not relieve the pain then her only other choice would be to go to the emergency room for further evaluation

## 2021-05-07 ENCOUNTER — TELEPHONE (OUTPATIENT)
Dept: ORTHOPEDIC SURGERY | Facility: CLINIC | Age: 51
End: 2021-05-07

## 2021-05-07 DIAGNOSIS — M19.071 ARTHRITIS OF FIRST METATARSOPHALANGEAL (MTP) JOINT OF RIGHT FOOT: ICD-10-CM

## 2021-05-07 RX ORDER — HYDROCODONE BITARTRATE AND ACETAMINOPHEN 7.5; 325 MG/1; MG/1
TABLET ORAL
Qty: 42 TABLET | Refills: 0 | Status: CANCELLED | OUTPATIENT
Start: 2021-05-07

## 2021-05-07 NOTE — TELEPHONE ENCOUNTER
Caller: JEFFREY DONALDSON    Relationship: BOYFRIEND (ON [facility])     Best call back number: [phone]    Medication needed:   HYDROcodone-acetaminophen (NORCO) 7.5-325 MG per tablet    When do you need the refill by: 5/9    What additional details did the patient provide when requesting the medication:   PATIENT HAD SURGERY ON 5/4. SHE WILL NOT HAVE MEDICATION FOR Monday MORNING IF SHE DOESN'T GET REFILLED.     Does the patient have less than a 3 day supply:  [x] Yes  [] No    What is the patient's preferred pharmacy:    [facility]  [phone]

## 2021-05-07 NOTE — TELEPHONE ENCOUNTER
----- Message from Mouna Muller MA sent at 5/7/2021  8:09 AM EDT -----  Regarding: FW: Complaint  Contact: [phone]  Patient had spoken to Aury. Aury gave her advice and per Dr. Mercado changed her pain medication. See telephone encounter.    ----- Message -----  From: Vanesa Escudero  Sent: 5/6/2021   5:27 PM EDT  To: Jennifer Figueroa j [facility]  Subject: Complaint                                        I just spoke to someone at your office. She was questioning me and insuating that I have done something to my foot.  I have had It elevated and iced since I've been home.  I have put no pressure on it.  It was very discouraging.  She wasn't even aware of the procedure that I had.  I am in alot of pain and do not know what to do.

## 2021-05-07 NOTE — TELEPHONE ENCOUNTER
----- Message from Mouna Muller MA sent at 5/7/2021  8:09 AM EDT -----  Regarding: FW: Complaint  Contact: [phone]  Patient had spoken to Aury. Aury gave her advice and per Dr. Mercado changed her pain medication. See telephone encounter.    ----- Message -----  From: Vanesa Escudero  Sent: 5/6/2021   5:27 PM EDT  To: Jennifer Figueroa j [facility]  Subject: Complaint                                        I just spoke to someone at your office. She was questioning me and insuating that I have done something to my foot.  I have had It elevated and iced since I've been home.  I have put no pressure on it.  It was very discouraging.  She wasn't even aware of the procedure that I had.  I am in alot of pain and do not know what to do.

## 2021-05-10 ENCOUNTER — TELEPHONE (OUTPATIENT)
Dept: ORTHOPEDIC SURGERY | Facility: CLINIC | Age: 51
End: 2021-05-10

## 2021-05-10 DIAGNOSIS — M19.071 ARTHRITIS OF FIRST METATARSOPHALANGEAL (MTP) JOINT OF RIGHT FOOT: ICD-10-CM

## 2021-05-10 RX ORDER — HYDROCODONE BITARTRATE AND ACETAMINOPHEN 7.5; 325 MG/1; MG/1
TABLET ORAL
Qty: 42 TABLET | Refills: 0 | Status: SHIPPED | OUTPATIENT
Start: 2021-05-10 | End: 2021-05-12 | Stop reason: SDUPTHER

## 2021-05-10 NOTE — TELEPHONE ENCOUNTER
I spoke with patient she had surgery a week ago tomorrow and had some pain issues last week when I was out of town.  She said she is feeling quite a bit better now and has been using Lortab 7.5 mg 2 every 4 hours.  We will refill that for her today and discussed careful use of narcotics and she will try to wean down on that if possible to every 6 hours or 1 every 4 hours if she cannot.  She let us know if she needs any further refills and I will see her back next week as scheduled.  She appreciated the call

## 2021-05-10 NOTE — H&P
History and Physical      Patient Name: Vanesa Escudero   Patient ID: [id number]   Sex: Female   YOB: 1970    Primary Care Provider: Selena MARTIN   Referring Provider: Selena MARTIN    Visit Date: September 4, 2020    Provider: Jerald Schilling MD   Location: [facility]   Location Address: [address] [place] [address], [facility] [[email or web address] A   QUINTEN Gerard  [id number]   Location Phone: [phone]          Chief Complaint  · High blood pressure       History Of Present Illness  Consult requested by: Selena MARTIN   Vanesa Escudero is a 50 year old, /White female with a history of hypertension and chronic tobacco use, who was referred here for further evaluation by her PCP. She reports that she was diagnosed with hypertension approximately 10 years ago, and her blood pressure has frequently been poorly controlled throughout that time. She is currently on multivitamin anti-hypertensive medications, including Amlodipine 10 mg daily, Losartan 25 mg daily, Clonidine 0.1 mg twice daily and Metoprolol 200 mg twice daily. She reports fair compliance with these medications but does occasionally miss some doses. Ms. Escudero states that she has frequent intermittent headaches and mild chronic exertional dyspnea, which has worsened slightly over the last 6 to 8 months. No episodes of chest pain/pressure or any visual changes reported. Likewise, she has not experienced any palpitations, orthopnea, PND, peripheral edema, lightheadedness, syncope or abdominal pain. Her previous renal ultrasound showed normal kidney size. Unfortunately, the renal arteries were not assessed on that study. The patient's EKG obtained in the office today shows sinus rhythm with a heart rate of 87 beats per minute and possible left ventricular hypertrophy with mild ST elevation observed in leads V1/V2, likely secondary to left ventricular hypertrophy vs. early repolarization. The patient does not report any previous  "cardiac testing, other than an EKG.   PAST MEDICAL HISTORY: Hypertension; tobacco dependence; nephrolithiasis; chronic shoulder and lower-back pain; osteoarthritis; ADHD. PAST SURGICAL HISTORY: Foot surgery.   PSYCHOSOCIAL HISTORY: She currently smokes 1 pack of cigarettes daily and has been smoking for approximately 30 years. She does not report any history of alcohol abuse or illicit drug use. Ms. Escudero is .   FAMILY HISTORY: Positive for hypertension in her mother and several grandparents. Negative for diabetes mellitus or premature coronary artery disease.   CURRENT MEDICATIONS: include Lopressor 200 mg b.i.d.; Clonidine 0.1 mg b.i.d.; Amlodipine 10 mg daily; Adderall 30 mg and Adderall 20 mg daily. The dosage and frequency of the medications were reviewed with the patient.   ALLERGIES: Penicillin and Bactrim.       Review of Systems  · Constitutional  o Admits  o : fatigue  o Denies  o : good general health lately, recent weight changes   · Eyes  o Admits  o : blurred vision  o Denies  o : double vision  · HENT  o Denies  o : hearing loss or ringing, chronic sinus problem, swollen glands in neck  · Cardiovascular  o Denies  o : chest pain, palpitations (fast, fluttering, or skipping beats), swelling (feet, ankles, hands), shortness of breath while walking or lying flat  · Respiratory  o Denies  o : chronic or frequent cough, asthma or wheezing, COPD  · Gastrointestinal  o Admits  o : ulcers  o Denies  o : nausea or vomiting  · Neurologic  o Denies  o : lightheaded or dizzy, stroke, headaches  · Musculoskeletal  o Denies  o : joint pain, back pain  · Endocrine  o Denies  o : thyroid disease, diabetes, heat or cold intolerance, excessive thirst or urination  · Heme-Lymph  o Denies  o : bleeding or bruising tendency, anemia      Vitals  Date Time BP Position Site L\R Cuff Size HR RR TEMP (F) WT  HT  BMI kg/m2 BSA m2 O2 Sat HC       09/04/2020 12:12 /128 Sitting    104 - R   144lbs 0oz 5'  4\" " 24.72 1.72     09/04/2020 12:12 /102 Sitting    104 - R                 Physical Examination  · Constitutional  o Appearance  o : Awake, alert, in no acute distress.  · Head and Face  o HEENT  o : No pallor, anicteric. Eyes normal. Moist mucous membranes.  · Neck  o Inspection/Palpation  o : Supple. No hepatosplenomegaly.  o Jugular Veins  o : No JVD. No carotid bruits. No mass. No thyromegaly. Trachea midline.  · Respiratory  o Auscultation of Lungs  o : Clear to auscultation bilaterally. No wheezing, rales or rhonchi. No dullness to percussion. Normal effort with no accessory muscle use. No tachypnea.  · Cardiovascular  o Heart  o : Regular rate and rhythm. Normal S1 and S2. No S3 gallop. +S4. No murmur. No friction rub. PMI non-displaced.  · Gastrointestinal  o Abdominal Examination  o : Soft, non-distended, non-tender. Normal bowel sounds throughout all quadrants. No masses. No hepatosplenomegaly.   · Musculoskeletal  o General  o : Normal muscle tone and strength.  · Skin and Subcutaneous Tissue  o General Inspection  o : Warm and dry. Multiple ecchymoses. No rashes. Normal capillary refill.  · Extremities  o Extremities  o : No cyanosis or clubbing. No edema. 2+ radial pulses bilaterally.      EKG was performed in the office today.  Indication:       hypertension.  Results:          sinus rhythm with a heart rate of 87 beats per minute and mild ST elevation in leads V1/V2,                         likely secondary to left ventricular hypertrophy vs. early repolarization.  Comparison:    There is no prior EKG available for comparison.    Labs from July 21, 2020 - CBC:  White blood cells 12.11, hemoglobin 14.7, hematocrit 43.2, platelets 312.  Chemistry:  Sodium 137, potassium 3.8, chloride 98, bicarb 26, BUN 18, creatinine 1.3, glucose 95.  Liver function tests were within normal limits.           Assessment     1.  Essential hypertension, uncontrolled despite therapy with 4 anti-hypertensive medications  currently.  2.  Chronic ongoing tobacco abuse.  3.  ADHD, on chronic therapy with Adderall.  4.  Abnormal EKG suggestive of left ventricular hypertrophy.       Plan     Will obtain an echocardiogram for further evaluation given the patient's abnormal EKG and uncontrolled hypertension.  Will also obtain a renal artery Doppler to rule out hemodynamically significant renal artery stenosis.  Will check a treadmill stress test for cardiovascular risk stratification given the patient's chronic exertional dyspnea.  Will continue Amlodipine 10 mg daily as well as her current dose of Losartan.  I am going to increase her Clonidine dose to 0.3 mg twice daily.  I will also have her discontinue Metoprolol at this time and start Carvedilol 12.5 mg twice daily as this is a much better beta blocker for control of hypertension.  I will plan to see her back in the office in several weeks once we have the aforementioned tests, and we can make further medication adjustments as needed.    Jerald Schilling MD  BP/dmd           This note was transcribed by Mini Soto.  dmd/BP  The above service was transcribed by Mini Soto, and I attest to the accuracy of the note.  BP               Electronically Signed by: Mini Soto-, -Author on September 9, 2020 04:00:34 AM  Electronically Co-signed by: Jerald Schilling MD -Reviewer on September 14, 2020 08:45:47 AM

## 2021-05-10 NOTE — H&P
History and Physical      Patient Name: Vanesa Escudero   Patient ID: [id number]   Sex: Female   YOB: 1970    Primary Care Provider: Selena MARTIN   Referring Provider: Selena MARTIN    Visit Date: August 18, 2020    Provider: Leo Segovia DPM   Location: [facility]   Location Address: [address] [place] [address]  [id number]   Location Phone: [phone]          Chief Complaint  · Right Foot Pain      History Of Present Illness  Vanesa Escudero is a 50 year old /White female who presents to the [facility] today new patient referred from Selena MARTIN.      New, Established, New Problem: New  Location: Right second MPJ  Duration: Late July 2020  Onset: Acute, hit her right forefoot very hard while using a   Nature: Sore, achy, dislocated right second metatarsal phalangeal joint  Stable, worsening, improving: Worsening  Aggravating factors:  Patient relates pain is aggravated by shoe gear and ambulation.    Previous Treatment: X-ray    Patient denies any fevers, chills, nausea, vomiting, shortness of breathe, nor any other constitutional signs nor symptoms.    Patient states her right second toe was not dorsiflexed nor crossing over her right hallux before the injury.       Past Medical History  Allergies; Anxiety; Arthritis; Back pain; Backache; Broken bones; Bunion; Foot pain, right; Forgetfulness; Hammertoe; Hypertension; Kidney stone; Night sweat; Shoulder pain; Sinus trouble         Past Surgical History  Foot surgery         Medication List  Adderall 10 mg oral tablet; amlodipine 10 mg oral tablet; Blood Pressure Cuff miscellaneous misc; chlorthalidone 25 mg oral tablet; clonidine HCl 0.1 mg oral tablet; fexofenadine 180 mg oral tablet; ibuprofen 800 mg oral tablet; Lopressor 100 mg oral tablet; losartan 50 mg oral tablet; Macrobid 100 mg oral capsule; tramadol 50 mg oral tablet;  "Vistaril 25 mg oral capsule         Allergy List  Adhesives; amoxicillin; Bactrim; PENICILLINS       Allergies Reconciled  Family Medical History  Family history of cancer; Family history of heart disease         Reproductive History   5 Para 3 0 0 0 & Postmenopausal       Social History  Alcohol (Never); Denies illicit substance abuse; Denies substance abuse; Tobacco (Current every day)         Review of Systems  · Constitutional  o Denies  o : fatigue, night sweats  · Eyes  o Denies  o : double vision, blurred vision  · HENT  o Denies  o : vertigo, recent head injury  · Cardiovascular  o Denies  o : chest pain, irregular heart beats  · Respiratory  o Denies  o : shortness of breath, productive cough  · Gastrointestinal  o Denies  o : nausea, vomiting  · Genitourinary  o Denies  o : dysuria, urinary retention  · Integument  o Denies  o : hair growth change, new skin lesions  · Neurologic  o Denies  o : altered mental status, seizures  · Musculoskeletal  o * See HPI  · Endocrine  o Denies  o : cold intolerance, heat intolerance  · Heme-Lymph  o Denies  o : petechiae, lymph node enlargement or tenderness  · Allergic-Immunologic  o Denies  o : frequent illnesses      Vitals  Date Time BP Position Site L\R Cuff Size HR RR TEMP (F) WT  HT  BMI kg/m2 BSA m2 O2 Sat HC       2020 02:45 /103 Sitting    82 - R  97.5 144lbs 6oz 5'  4\" 24.78 1.72 99 %    2020 02:45 /101 Sitting                     Physical Examination  · Constitutional  o Appearance  o : well developed, well-nourished, no obvious deformities present  · Cardiovascular  o Peripheral Vascular System  o :   § Pedal Pulses  § : pulses 2 + and symmetrical  § Extremities  § : no edema in lower extremities  · Musculoskeletal  o General  o :   § General Musculoskeletal  § : Lower extremity muscle and strength and range of motion is equal and symmetrical bilaterally. The knees are noted to be normal in alignment. Bilateral medial deviation " of the first metatarsal with associated lateral deviation of the hallux at the metatarsal phalangeal joints. Dorsally dislocated right second MPJ with second toe crossing over the hallux. Attempts to relocate this were unsuccessful in the office.   · Skin and Subcutaneous Tissue  o General Inspection  o : Skin is noted to have normal texture and turgor, with no excrescences noted.   o Digits and Nails  o : The toenails are noted to be without disese.  · Neurologic  o Sensation  o : Epicritic sensations intact bilaterally.     Dr. Segovia reviewed radiographs and results from [facility] and discussed them with the patient.  These are significant for medial deviation of the first metatarsal with associated lateral deviation of the hallux at the metatarsal phalangeal joint with crossover right second toe.  Dislocated right second MPJ.  No periosteal reactions nor osteolytic changes seen.  No occult fractures seen.           Assessment  · Foot pain, right     729.5/M79.671  · Plantar plate injury, right, initial encounter     959.7/S99.921A      Plan  · Orders  o PODIATRY CONSULTATION (PODIA) - - 08/18/2020  · Medications  o ibuprofen 800 mg oral tablet   SIG: take 1 tablet (800 mg) by oral route 4 times per day with food   DISP: (30) tablets with 0 refills  Refilled on 08/18/2020     o Medications have been Reconciled  o Transition of Care or Provider Policy  · Instructions  o Follow Up PRN.  o Discuss Findings: I have discussed the findings of this evaluation with the patient. The discussion included a complete verbal explanation of any changes in the examination results, diagnosis, and the current treatment plan. A schedule for future care needs was explained. If any questions should arise after returning home, I have encouraged the patient to feel free to contact Dr. Segovia. The patient states understanding and agreement with this plan.  o The need for a referral to another physician was discussed  with the patient. They state understanding and agreement with this plan. Patient's been referred to Dr. Berrios for possible plantar plate repair of the right second MPJ.  o Rice Therapy: It is important to treat any injury as soon as possible to help control swelling and increase recovery time. The recognized regimen for immediate treatment of sport injuries includes rest, ice (cold application), compression, and elevation (RICE). Remove the injured athlete from play, apply ice to the affected area, wrap or compress the injured area with an elastic bandage when appropriate, and elevate the injured area above heart level to reduce swelling. The patient is to not use ice for longer than 20 minutes at a time, with at least 20 minutes of no ice usage between applications. The patient states understanding and agreement with this plan.   o Patient to monitor for recurrence of symptoms and to contact Dr. Maddox office for a follow-up appointment.  o Electronically Identified Patient Education Materials Provided Electronically  · Disposition  o Call or Return if symptoms worsen or persist.            Electronically Signed by: Leo Segovia DPM -Author on August 18, 2020 03:09:32 PM

## 2021-05-10 NOTE — PROCEDURES
"   Procedure Note      Patient Name: Vanesa Escudero   Patient ID: [id number]   Sex: Female   YOB: 1970    Primary Care Provider: Selena MARTIN   Referring Provider: Selena MARTIN    Visit Date: September 17, 2020    Provider: Jerald Schilling MD   Location: [facility]   Location Address: [address] [place] [address], [address]   QUINTEN Gerard  [id number]   Location Phone: [phone]          FINAL REPORT   TRANSTHORACIC ECHOCARDIOGRAM REPORT    Diagnosis: Shortness of breath, abnormal EKG.   Height: 5'4\" Weight: 144 B/P: BLOOD PRESSURE BSA: 1.7   Tech: [facility]   MEASUREMENTS:  RVID (Diastole) : RVID. (NORMAL: 0.7 to 2.4 cm max)   LVID (Systole): 3.1 cm (Diastole): 4.4 cm . (NORMAL: 3.7 - 5.4 cm)   Posterior Wall Thickness (Diastole): 1.2 cm. (NORMAL: 0.8 - 1.1 cm)   Septal Thickness (Diastole): 1.2 cm. (NORMAL: 0.7 - 1.2 cm)   LAID (Systole): 3.2 cm. (NORMAL: 1.9 - 3.8 cm)   Aortic Root Diameter (Diastole): 3.6 cm. (NORMAL: 2.0 - 3.7 cm)   COMMENTS:  The patient underwent 2-D, M-Mode, and Doppler examination, including pulse-wave, continuous-wave, and color-flow analysis; the study is technically adequate.   FINDINGS:  MITRAL VALVE: The mitral leaflets appeared normal and open well. No evidence of mitral valve prolapse or mitral stenosis. Trace mitral regurgitation.   AORTIC VALVE: Trileaflet aortic valve, which opens well. No evidence of aortic stenosis or aortic regurgitation.   TRICUSPID VALVE: Normal valve morphology and leaflet excursion. Trace tricuspid regurgitation. Estimated right ventricular systolic pressure was 20-25 mmHg.   PULMONIC VALVE: Grossly normal. No pulmonic stenosis. Trace pulmonic regurgitation.   AORTIC ROOT: Normal in size.   LEFT ATRIUM: Mildly dilated left atrium. LA volume index is 34 mL/m2. No intracavitary thrombus or mass visualized.   LEFT VENTRICLE: Normal left ventricular size. Mild concentric left ventricular hypertrophy. No left ventricular thrombus or mass " visualized. Normal left ventricular systolic function with an estimated ejection fraction of 55%. No regional wall motion abnormalities were visualized. Impaired relaxation filling pattern (grade 1 diastolic dysfunction). Tissue Doppler findings were consistent with elevated left ventricular filling pressure.   RIGHT VENTRICLE: Normal right ventricular size and systolic function.   RIGHT ATRIUM: Normal right atrial size.   PERICARDIUM: Small pericardial effusion.   INFERIOR VENA CAVA: Normal IVC size and respirophasic changes.   DOPPLER: E/A ratio is 0.9. DT= 237 msec. IVRT is 142 msec. E/e' is 14.   Faxed: 09/24/2020      CONCLUSIONS:  1.  Normal left ventricular systolic function with an estimated ejection fraction of 55%.  No regional wall motion        abnormalities were visualized.  Mild concentric LVH.   2.  Grade 1 diastolic dysfunction with elevated left ventricular filling pressure.  3.  Mildly dilated left atrium.   4.  No significant valvular heart disease.      MD TREVOR Alvarez/pap      This note was transcribed by Monisha Morales.  pap/trevor  The above service was transcribed by Monisha Morales, and I attest to the accuracy of the note.  BAP                 Electronically Signed by: Gloria Morales-, Other -Author on September 24, 2020 08:13:46 AM  Electronically Co-signed by: Jerald Schilling MD -Reviewer on September 24, 2020 09:59:37 AM

## 2021-05-12 ENCOUNTER — TELEPHONE (OUTPATIENT)
Dept: ORTHOPEDIC SURGERY | Facility: CLINIC | Age: 51
End: 2021-05-12

## 2021-05-12 DIAGNOSIS — M19.071 ARTHRITIS OF FIRST METATARSOPHALANGEAL (MTP) JOINT OF RIGHT FOOT: ICD-10-CM

## 2021-05-12 NOTE — TELEPHONE ENCOUNTER
I returned patient's message as she had sent me a message that she appreciated me calling her on Monday.  She says she is doing relatively well when I spoke with her she was getting out to get into the car to go to the store and counseled to be careful with this.  Her pain has been controlled with the current pain medication regimen but is using Lortab 7.5  2 every 4 hours.  She is due to run out tomorrow and I told her I would refill this tomorrow and then when we see her on Monday we will start weaning down on pain medication but she understood.  All of her questions answered to her full satisfaction and we will see her on Monday.  She appreciated the call

## 2021-05-13 RX ORDER — HYDROCODONE BITARTRATE AND ACETAMINOPHEN 7.5; 325 MG/1; MG/1
TABLET ORAL
Qty: 42 TABLET | Refills: 0 | Status: SHIPPED | OUTPATIENT
Start: 2021-05-13 | End: 2021-05-17

## 2021-05-13 NOTE — PROGRESS NOTES
Progress Note      Patient Name: Vanesa Escudero   Patient ID: [id number]   Sex: Female   YOB: 1970    Primary Care Provider: Selena MARTIN   Referring Provider: Selena MARTIN    Visit Date: August 4, 2020    Provider: MARIO Lizama   Location: [facility]   Location Address: [address] [place] [address] [address]  [place], [place]  [id number]   Location Phone: [phone]          Chief Complaint  · Right foot pain.  · Med refills.      History Of Present Illness  Vanesa Escudero is a 50 year old /White female who presents for evaluation and treatment of:      Pt presents for med refills. Pt c/o R foot pain x 8 days. Hit her foot with  8 days ago. Pain across  top of foot and 2nd toe. Has taken ibuprofen for the pain which has not helped.     BP elevated 177/103. States she ran out of medication, but denies missing pills. Denies SOB, chest pain, fever, chills, HA, blurred vision, dizziness, cough.       Past Medical History  Disease Name Date Onset Notes   Allergies --  --    Anxiety --  --    Arthritis --  --    Back pain --  --    Backache --  --    Broken bones --  --    Forgetfulness --  --    Hypertension --  --    Kidney stone --  --    Night sweat --  --    Shoulder pain --  --    Sinus trouble --  --          Medication List  Name Date Started Instructions   Adderall 10 mg oral tablet  take 1 tablet (10 mg) by oral route once daily before breakfast for 30 days   amlodipine 10 mg oral tablet 08/03/2020 1T PO QD BP   Blood Pressure Cuff miscellaneous misc 09/24/2019 use as directed   chlorthalidone 25 mg oral tablet 01/06/2020 take 1 tablet (25 mg) by oral route once daily for 30 days   clonidine HCl 0.1 mg oral tablet 08/03/2020 take 1 tablet (0.1 mg) by oral route 3 times per day for 30 days   fexofenadine 180 mg oral tablet 09/24/2019 take 1 tablet (180 mg) by oral route once daily   Lopressor 100 mg oral tablet 08/03/2020 take 2  "tablets (200 mg) by oral route 2 times per day with meals for 30 days   Macrobid 100 mg oral capsule 2020 take 1 capsule (100 mg) by oral route every 12 hours with food for 7 days   Vistaril 25 mg oral capsule  1 tablet at night         Allergy List  Allergen Name Date Reaction Notes   amoxicillin --  --  --    Bactrim --  --  --    PENICILLINS --  --  --        Allergies Reconciled  Family Medical History  Disease Name Relative/Age Notes   Family history of cancer Father/  Mother/   Lung and throat   Family history of heart disease Mother/   --          Reproductive History  Menstrual   Menopause Status: Postmenopausal Age Menopause: 49   Pregnancy Summary   Total Pregnancies: 5 Full Term: 3 Premature: 0   Ab Induced: 0 Ab Spontaneous: 0 Ectopics: 0   Multiples: 0 Livin         Social History  Finding Status Start/Stop Quantity Notes   Alcohol --  --/-- --  --    Denies illicit substance abuse --  --/-- --  --    Denies substance abuse --  --/-- --  --    Tobacco Current every day --/-- 1ppd --          Review of Systems  · Constitutional  o Denies  o : fever, fatigue, weight loss, weight gain  · HENT  o Denies  o : headaches, sore throat  · Cardiovascular  o Denies  o : lower extremity edema, claudication, chest pressure, palpitations  · Respiratory  o Denies  o : shortness of breath, wheezing, cough, hemoptysis, dyspnea on exertion  · Gastrointestinal  o Denies  o : nausea, vomiting, diarrhea, constipation, abdominal pain  · Musculoskeletal  o Admits  o : foot pain  · Psychiatric  o Denies  o : anxiety, depression, suicidal ideation, homicidal ideation      Vitals  Date Time BP Position Site L\R Cuff Size HR RR TEMP (F) WT  HT  BMI kg/m2 BSA m2 O2 Sat HC       2020 03:36 /103 Sitting    80 - R 20 98 148lbs 2oz 5'  4\" 25.43 1.74 100 %    2020 03:48 /115 Sitting                     Physical Examination  · Constitutional  o Appearance  o : no acute distress, well-nourished  · Head " and Face  o Head  o :   § Inspection  § : atraumatic, normocephalic  o Face  o :   § Inspection  § : no facial lesions  · Eyes  o Eyes  o : extraocular movements intact, no scleral icterus, no conjunctival injection  · Ears, Nose, Mouth and Throat  o Ears  o :   § External Ears  § : normal  o Nose  o :   § Intranasal Exam  § : nares patent  o Oral Cavity  o :   § Oral Mucosa  § : moist mucous membranes  · Respiratory  o Respiratory Effort  o : breathing comfortably, symmetric chest rise  o Auscultation of Lungs  o : clear to asculatation bilaterally, no wheezes, rales, or rhonchii  · Cardiovascular  o Heart  o :   § Auscultation of Heart  § : regular rate and rhythm, no murmurs, rubs, or gallops  o Peripheral Vascular System  o :   § Extremities  § : no edema  · Lymphatic  o Neck  o : no lymphadenopathy present  o Supraclavicular Nodes  o : no supraclavicular nodes  · Skin and Subcutaneous Tissue  o General Inspection  o : no lesions present, no areas of discoloration, skin turgor normal  · Neurologic  o Mental Status Examination  o :   § Orientation  § : grossly oriented to person, place and time  o Gait and Station  o :   § Gait Screening  § : normal gait  · Psychiatric  o General  o : normal mood and affect  · Right Ankle/Foot  o Inspection  o : dorsal surface edema, 2nd toe displacement, erythema, bruising              Assessment  · Essential hypertension     401.9/I10  · Nicotine dependence     305.1/F17.200  · Foot pain, right     729.5/M79.671       Essential Htn:  Uncontrolled  Discussed dangers of uncontrolled htn including MI, CVA, and other  Take all medications as prescribed.   Referral to cardiology  Add Losartan 50mg PO qd  Take BP reg, keep log, bring to next appt  Low NA, heart healthy diet  Reg exercise  FU end of week or in office BP check nurse visit    R foot pain:  XR   Tramadol 50mg PO qd  fuentes reviewed and appropriate  Narcotic consent signed  Ibuprofen 800mg PO qid PRN  Increase  rest  Mobilize as much as possible       Plan  · Orders  o ACO-17: Screened for tobacco use AND received tobacco cessation intervention (4004F) - 305.1/F17.200 - 08/04/2020  o TEE Report (KASPR) - - 08/04/2020  o ACO-39: Current medications updated and reviewed () - - 08/04/2020  o ACO-14: Influenza immunization was not administered for reasons documented () - - 08/04/2020  o Foot xray right [facility] (96570-AD) - 729.5/M79.671 - 08/04/2020  o CARDIOLOGY CONSULTATION (CARDI) - 401.9/I10 - 08/04/2020  · Medications  o losartan 50 mg oral tablet   SIG: take 1 tablet (50 mg) by oral route once daily for 30 days   DISP: (30) tablets with 5 refills  Prescribed on 08/04/2020     o tramadol 50 mg oral tablet   SIG: take 1 tablet (50 mg) by oral route every 6 hours as needed for 5 days   DISP: (20) tablets with 0 refills  Prescribed on 08/04/2020     o ibuprofen 800 mg oral tablet   SIG: take 1 tablet (800 mg) by oral route 4 times per day with food   DISP: (30) tablets with 0 refills  Prescribed on 08/04/2020     o meloxicam 15 mg oral tablet   SIG: take 1 tablet (15 mg) by oral route once daily for 30 days   DISP: (30) tablets with 5 refills  Discontinued on 08/04/2020     o Medications have been Reconciled  o Transition of Care or Provider Policy  · Instructions  o Patient advised to monitor blood pressure (B/P) at home and journal readings. Patient informed that a B/P reading at home of more than 130/80 is considered hypertension. For readings greater [id number]/90 or higher patient is advised to follow up in the office with readings for management. Patient advised to limit sodium intake.  o *Form of nicotine being used: cigarettes  o Patient was strongly encouraged to discontinue use of any nicotine containing product or minimize the use of the product.  o Discussed smoking cessation and counseling with patient for over 3 minutes.  o Obtained a written consent for TEE query. Discussed the risk and  benefits of the use of controlled substances with the patient, including the risk of tolerance and drug dependence. The patient has been counseled on the need to have an exit strategy, including potentially discontinuing the use of controlled substances. TEE has or will be reviewed as soon as it becomes avaliable.  o Patient is taking medications as prescribed and doing well.   o Take all medications as prescribed/directed.  o Patient was educated/instructed on their diagnosis, treatment and medications prior to discharge from the clinic today.  o Patient counseled to stop smoking.  o Patient instructed to seek medical attention urgently for new or worsening symptoms.  o Call the office with any concerns or questions.  o Bring all medicines with their bottles to each office visit.  o Risks, benefits, and alternatives were discussed with the patient. The patient is aware of risks associated with: all medications and conditions.   o Chronic conditions reviewed and taken into consideration for today's treatment plan.  o Flu vaccine declined.  o Discussed Covid-19 precautions including, but not limited to, social distancing, avoid touching your face, and hand washing.   · Disposition  o Call or Return if symptoms worsen or persist.  o Follow Up PRN.  o Follow Up in 1 week.  o Proceed to ED for all medical emergencies.            Electronically Signed by: MARIO Lizama -Author on August 4, 2020 04:16:52 PM

## 2021-05-13 NOTE — PROGRESS NOTES
Progress Note      Patient Name: Vanesa Escudero   Patient ID: [id number]   Sex: Female   YOB: 1970    Primary Care Provider: Selena MARTIN   Referring Provider: Selena MARTIN    Visit Date: November 4, 2020    Provider: MARIO Lizama   Location: [facility]   Location Address: [address] [place] [address]  [id number]   Location Phone: [phone]          Chief Complaint  · Annual Exam  · PAP exam  · (Health Maintainence Information Reviewed Under Results)      History Of Present Illness  Last PAP Smear: 2015.   Date of Last Mammogram: 2014.   Date of Last Colonoscopy: never   No current complaints.      Pt presents for PAP. Treated for BV on 10/24/20 with Flagyl. States S/S have mostly resolved but would like to be tested. Continues to notice increased vaginal discharge.       Past Medical History  Disease Name Date Onset Notes   Allergies --  --    Anxiety --  --    Arthritis --  --    Back pain --  --    Backache --  --    Broken bones --  --    Bunion --  --    Foot pain, right --  --    Forgetfulness --  --    Hammertoe --  --    Hypertension --  --    Kidney stone --  --    Night sweat --  --    Shoulder pain --  --    Sinus trouble --  --          Past Surgical History  Procedure Name Date Notes   Foot surgery 2014 --          Medication List  Name Date Started Instructions   Adderall 10 mg oral tablet  take 1 tablet (10 mg) by oral route once daily before breakfast for 30 days   amlodipine 10 mg oral tablet 08/03/2020 1T PO QD BP   Blood Pressure Cuff miscellaneous misc 09/24/2019 use as directed   chlorthalidone 25 mg oral tablet 01/06/2020 take 1 tablet (25 mg) by oral route once daily for 30 days   clonidine HCl 0.3 mg oral tablet 10/27/2020 take 1 tablet by oral route 2 times a day for 30 days   Coreg 12.5 mg oral tablet  take 1 tablet by oral route daily   fexofenadine 180 mg oral tablet 09/24/2019 take 1 tablet (180 mg) by oral route  once daily   Flagyl 500 mg oral tablet 10/27/2020 take 1 tablet (500 mg) by oral route every 12 hours for 7 days   ibuprofen 800 mg oral tablet 10/27/2020 take 1 tablet (800 mg) by oral route 4 times per day with food for 30 days   losartan 100 mg oral tablet 10/27/2020 take 1 tablet (100 mg) by oral route once daily for 30 days   tramadol 50 mg oral tablet 2020 take 1 tablet (50 mg) by oral route every 6 hours as needed for 5 days   Vistaril 25 mg oral capsule  1 tablet at night         Allergy List  Allergen Name Date Reaction Notes   Adhesives --  --  --    amoxicillin --  --  --    Bactrim --  --  --    PENICILLINS --  --  --        Allergies Reconciled  Family Medical History  Disease Name Relative/Age Notes   Family history of cancer Father/  Mother/   Lung and throat   Family history of heart disease Mother/   --          Reproductive History  Menstrual   Menopause Status: Postmenopausal Age Menopause: 49   Pregnancy Summary   Total Pregnancies: 5 Full Term: 3 Premature: 0   Ab Induced: 0 Ab Spontaneous: 0 Ectopics: 0   Multiples: 0 Livin         Social History  Finding Status Start/Stop Quantity Notes   Alcohol Never --/-- --  --    Denies illicit substance abuse --  --/-- --  --    Denies substance abuse --  --/-- --  --    Tobacco Current every day --/-- 1ppd --          Review of Systems  · Constitutional  o Denies  o : appetite change, fatigue, night sweats, weight gain, weight loss  · HENT  o Denies  o : hearing loss, tinnitus, vertigo, nasal discharge, nose bleeding, dental problems, oral lesions, sore throat  · Breasts  o Denies  o : lumps, tenderness, swelling, nipple discharge  · Cardiovascular  o Denies  o : chest pain, decreased exercise tolerance, dyspnea on exertion, palpitations  · Respiratory  o Denies  o : cough, shortness of breath, wheezing, snoring, apneas  · Gastrointestinal  o Denies  o : abdominal pain, nausea, vomiting, dysphagia, heartburn, changes in bowel habits,  "constipation, diarrhea  · Genitourinary  o Admits  o : vaginal discharge  o Denies  o : dysuria, hematuria, incontinence, nocturia, frequency, urgency, sexual dysfunction  · Neurologic  o Denies  o : abnormal gait, facial weakness, headache, memory difficulties, tingling or numbness, seizures, tremors  · Psychiatric  o Denies  o : anxiety, decreased concentration, irritability, panic attacks, sleep distrubances, sadness/tearfulness      Vitals  Date Time BP Position Site L\R Cuff Size HR RR TEMP (F) WT  HT  BMI kg/m2 BSA m2 O2 Sat FR L/min FiO2 HC       11/04/2020 02:44 /111 Sitting    84 - R 20 97.8 144lbs 0oz 5'  4\" 24.72 1.72 99 %  21%          Physical Examination  · Constitutional  o Appearance  o : well-nourished, in no acute distress  · Neck  o Inspection/Palpation  o : normal appearance, no masses or tenderness, trachea midline  o Range of Motion  o : cervical range of motion within normal limits  o Thyroid  o : gland size normal, nontender, no nodules or masses present on palpation  · Respiratory  o Respiratory Effort  o : breathing unlabored  o Inspection of Chest  o : normal appearance  o Auscultation of Lungs  o : normal breath sounds throughout  · Cardiovascular  o Heart  o :   § Auscultation of Heart  § : regular rate and rhythm, no murmurs, gallops or rubs  o Peripheral Vascular System  o :   § Carotid Arteries  § : normal pulses bilaterally, no bruits present  § Pedal Pulses  § : pulses 2 bilaterally  § Extremities  § : no edema  · Breasts  o Inspection of Breasts  o : breasts symmetrical, no skin changes, no deformities present, no discharge present  o Palpation of Breasts, Axillae  o : no masses present on palpation, no breast tenderness  · Gastrointestinal  o Abdominal Examination  o : abdomen nontender to palpation, tone normal without rigidity or guarding, no masses present, normal bowel sounds  · Genitourinary  o External Genitalia  o : no inflammation, no lesions present  o Vagina  o : " normal vaginal vault, no discharge present, no inflammatory lesions present, no masses present  o Urethra  o :   § Urethral Meatus  § : no inflammation present  o Bladder  o : nontender to palpation  o Cervix  o : appearance healthy, no lesions present, nontender to palpation, no discharges, no bleeding present, normal midline position  o Uterus  o : nontender to palpation, no masses present, position midline/midplane  o Adnexa  o : no tenderness or masses present on bimanual examination  o Anus  o : no inflammation or lesions present  o Perineum  o : perineum within normal limits  · Lymphatic  o Neck  o : no lymphadenopathy present  o Axilla  o : no lymphadenopathy present  o Groin  o : no lymphadenopathy present  · Neurologic  o Mental Status Examination  o :   § Orientation  § : grossly oriented to person, place and time  o Gait and Station  o : normal gait, able to stand without difficulty  · Psychiatric  o Judgement and Insight  o : judgment and insight intact  o Mood and Affect  o : mood normal, affect appropriate          Results  · In-Office Procedures  o Lab procedure  § IOP - Urinalysis (Clinitek) with Microscopy (91255)   § Color Ur: Yellow   § Clarity Ur: Clear   § Glucose Ur Ql Strip: Negative   § Bilirub Ur Ql Strip: Small   § Ketones Ur Ql Strip: Trace   § Sp Gr Ur Qn: 1.025   § Hgb Ur Ql Strip: Negative   § pH Ur-LsCnc: 5.5   § Prot Ur Ql Strip: 30 mg/dL   § Urobilinogen Ur Strip-mCnc: 0.2 E.U./dL   § Nitrite Ur Ql Strip: Negative   § WBC Est Ur Ql Strip: Trace       Assessment  · Routine gynecological examination     V72.31/Z01.419  · Pap smear, as part of routine gynecological examination     V76.2/Z01.419  · Screening Mammogram     V76.10/Z12.39  · Vaginal Discharge     623.5/N89.8  · Essential (primary) hypertension     401.9/I10       Pap:  Pt tolerated well, no issues or complaints.  States she does monthly self-breast exam at home.   Vag screen swab     Hypertension:  Uncontrolled  Advised to  phone cardiologist office for instruction/appt  Pt states that she no longer wishes to see Dr. Schilling, advised she may request a different provider when she calls office, uncertain of their policy regarding changing providers.   Pt states she was unable to get BP cuff from pharmacy  Continue medications as prescribed  Advised smoking cessation  Low NA diet  Reg exercise  Proceed to ED if chest pain, syncope, dizziness, blurred vision, or other     Problems Reconciled  Plan  · Orders  o Vaginal Screen (Candida, Gardnerella & Trichomonas) (67479) - V72.31/Z01.419 - 11/04/2020  o Pap smear (59322) - V76.2/Z01.419 - 11/04/2020  o Screening Mammogram 3D Bilateral (89532, 64767, ) - V76.10/Z12.39 - 11/04/2020  · Medications  o Flagyl 500 mg oral tablet   SIG: take 1 tablet (500 mg) by oral route every 12 hours for 7 days   DISP: (14) Tablet with 0 refills  Discontinued on 11/04/2020     o Medications have been Reconciled  o Transition of Care or Provider Policy  · Instructions  o **Pap Test/Liquid Based:   o Source:  o ********  o **Perform Reflex Human Papilloma Virus (HPV) High Risk on this Pap (If atypical squamous cells of the undetermined signifigcance (ASCUS)/Atypical Glandular Cells of undetermined significance (AGCUS): Low Grade Squamous Intraepitheal lesion (LGSIL): **  o No  o ********  o Medicare:  o No  o **Is this an annual PAP:  o Yes  o Last Menstrual Period (First Day of): 2016  o Lab in which Performed: Unknown  o Previous Pap Smear Date: 2015  o Counseled on monthly breast self exams.   o Counseled on STD prevention.  o Counseled on diet and exercise.   o Counseled on weight-bearing exercise.  o Recommended Calcium with Vitamin D twice daily.  o Used cytobrush to obtain Pap smear specimen. Sent to pathology for testing and review.  · Disposition  o Call or Return if symptoms worsen or persist.  o Follow Up PRN.  o Proceed to ED for all medical emergencies.            Electronically Signed by: Selena  MARIO Fletcher -Author on November 4, 2020 02:52:23 PM

## 2021-05-13 NOTE — PROGRESS NOTES
Progress Note      Patient Name: Vanesa Escudero   Patient ID: [id number]   Sex: Female   YOB: 1970    Primary Care Provider: Selena MARTIN   Referring Provider: Selena MARTIN    Visit Date: October 23, 2020    Provider: Jerald Schilling MD   [facility] [facility]   Location Address: [address] [place] [address], [address]   QUINTEN Gerard  [id number]   Location Phone: [phone]          Chief Complaint     Dizziness, follow up of high blood pressure.       History Of Present Illness  REFERRING CARE PROVIDER: Selena MARTIN   Vanesa Escudero is a 50 year old /White female who presents for a routine followup today, as well as the results of her recent cardiovascular testing. Her renal artery Doppler showed no evidence of renal artery stenosis bilaterally. Her recent echocardiogram showed normal left ventricular systolic function with an estimated ejection fraction of 55%, mild concentric left ventricular hypertrophy, and grade I diastolic dysfunction with elevated left ventricular filling pressures. No significant valvular disease was noted. Her treadmill stress test was nondiagnostic due to inability to reach the target heart rate, as she forgot to stop taking her beta-blocker before the procedure. Only 74% of the maximum age-predicted heart rate was achieved. Nonetheless, there was no EKG evidence of ischemia at submaximal exercise and she had no evidence of inducible angina at submaximal exercise. Her antihypertensive medications were adjusted by me since her last visit, and she is currently taking losartan 100 mg daily, amlodipine 10 mg daily, clonidine 0.3 mg b.i.d., and Coreg 12. 5 mg twice daily. Her BP control was improved in the office today with a blood pressure of 148/100, down from 166/102 at her last visit. Unfortunately, she has not been checking her blood pressure at home, as she states her insurance company will not pay for a blood pressure cuff. She does  "report good compliance with her home medications. Ms. Escudero has not experienced any episodes of chest pain/pressure, visual changes, or increased shortness of breath. She does report a few intermittent headaches and suffered an episode of severe dizziness several weeks ago. She felt lightheaded apparently, and she states her eyes rolled back in her head and she could not speak for approximately 15 minutes. She had no residual symptoms after that time. The patient is scheduled to see a neurologist for further evaluation in the next couple of weeks.   PAST MEDICAL HISTORY: ADHD; Chronic shoulder and low back pain; Hypertension; Nephrolithiasis; Osteoarthritis; Tobacco dependence.   PSYCHOSOCIAL HISTORY: Current smoker of one pack per day. Rarely consumes alcohol.   CURRENT MEDICATIONS: Clonidine 0.3 mg b.i.d.; Coreg 12.5 mg b.i.d.; amlodipine 10 mg daily; losartan 100 mg daily; Adderall 30 mg daily.       Review of Systems  · Cardiovascular  o Admits  o : palpitations (fast, fluttering, or skipping beats), swelling (feet, ankles, hands), shortness of breath while walking or lying flat  o Denies  o : chest pain or angina pectoris   · Respiratory  o Denies  o : chronic or frequent cough      Vitals  Date Time BP Position Site L\R Cuff Size HR RR TEMP (F) WT  HT  BMI kg/m2 BSA m2 O2 Sat FR L/min FiO2 HC       10/23/2020 11:23 /100 Sitting    90 - R   145lbs 0oz 5'  4\" 24.89 1.72       10/23/2020 11:23 /96 Sitting                       Physical Examination  · Neck  o Inspection/Palpation  o : Supple. No JVD. No carotid bruit. No masses. Trachea midline.   · Respiratory  o Auscultation of Lungs  o : Clear to auscultation bilaterally. No wheezing, rales, or rhonchi. Normal effort. No tachypnea. No dullness to percussion.   · Cardiovascular  o Heart  o : Regular rate and rhythm. Normal S1 and S2. No S3 gallop. No murmur. No friction rub. No heave. PMI is not displaced.   · Gastrointestinal  o Abdominal " Examination  o : Soft, nontender, nondistended. Normal bowel sounds in all quadrants. No masses. No organomegaly.   · Skin and Subcutaneous Tissue  o General Inspection  o : Warm and dry. No rashes or lesions. Normal skin turgor. Normal capillary refill.   · Extremities  o Extremities  o : No cyanosis, clubbing, or edema. 2+ radial pulses bilaterally. Palpable PT pulses bilaterally.           Assessment     1.  Essential hypertension.  Her BP control is improving, but remains suboptimal despite therapy with four        antihypertensive medications.  A recent renal artery Doppler ultrasound showed no evidence of renal artery        stenosis bilaterally.    2.  Chronic ongoing tobacco abuse.  3.  ADHD, on chronic therapy with Adderall.  4.  Mild concentric left ventricular hypertrophy and mild diastolic dysfunction per recent echocardiogram.       Plan     1.  I will continue her current doses of losartan, amlodipine, and clonidine.  I am going to increase her Coreg        dose to 25 mg twice daily.  I also wrote her a prescription for a blood pressure cuff today, and she was        instructed to start keeping a blood pressure log at least several times per week at home.  She will call us        with her blood pressure readings in approximately 5-6 weeks.  If her BP remains significantly elevated at        that time, I will likely start additional therapy with chlorthalidone 25 mg daily.    2.  Extensive tobacco cessation counseling was again provided today, and I again reminded her of the        deleterious effects of continued smoking on her blood pressure control.  However, she does not express        interest in quitting smoking at this time.  3.  If she is doing well, I will just plan to see her back in the office in 6 months for reassessment.        Jerald Schilling MD  BP:vm             Electronically Signed by: Lilian Collins-, Other -Author on October 27, 2020 01:08:11  PM  Electronically Co-signed by: Jerald Schilling MD -Reviewer on October 30, 2020 10:13:37 AM

## 2021-05-13 NOTE — PROGRESS NOTES
Progress Note      Patient Name: Vanesa Escudero   Patient ID: [id number]   Sex: Female   YOB: 1970    Primary Care Provider: Selena MARTIN   Referring Provider: Selena MARTIN    Visit Date: October 27, 2020    Provider: MARIO Lizama   Location: [facility]   Location Address: [address] [place] [address]  [id number]   Location Phone: [phone]          Chief Complaint  · Vaginal discomfort, odor and discharge. x5 days      History Of Present Illness  TELEHEALTH TELEPHONE VISIT  Vanesa Escudero is a 50 year old /White female who is presenting for evaluation via telehealth telephone visit. Verbal consent obtained before beginning visit.   Provider spent 13 minutes with patient during telehealth visit.   The following staff were present during this visit: MARIO Lizama   Past Medical History/Overview of Patient Symptoms  Vanesa Escudero is a 50 year old /White female who presents for evaluation and treatment of:      Pt presents with increased vaginal discharge, clear/mucous, foul vaginal odor x 1 week.       Past Medical History  Disease Name Date Onset Notes   Allergies --  --    Anxiety --  --    Arthritis --  --    Back pain --  --    Backache --  --    Broken bones --  --    Bunion --  --    Foot pain, right --  --    Forgetfulness --  --    Hammertoe --  --    Hypertension --  --    Kidney stone --  --    Night sweat --  --    Shoulder pain --  --    Sinus trouble --  --          Past Surgical History  Procedure Name Date Notes   Foot surgery 2014 --          Medication List  Name Date Started Instructions   Adderall 10 mg oral tablet  take 1 tablet (10 mg) by oral route once daily before breakfast for 30 days   amlodipine 10 mg oral tablet 08/03/2020 1T PO QD BP   Blood Pressure Cuff miscellaneous misc 09/24/2019 use as directed   chlorthalidone 25 mg oral tablet 01/06/2020 take 1 tablet (25 mg) by oral route once daily  for 30 days   clonidine HCl 0.3 mg oral tablet 10/27/2020 take 1 tablet by oral route 2 times a day for 30 days   Coreg 12.5 mg oral tablet  take 1 tablet by oral route daily   fexofenadine 180 mg oral tablet 2019 take 1 tablet (180 mg) by oral route once daily   ibuprofen 800 mg oral tablet 10/27/2020 take 1 tablet (800 mg) by oral route 4 times per day with food for 30 days   losartan 100 mg oral tablet 10/27/2020 take 1 tablet (100 mg) by oral route once daily for 30 days   Macrobid 100 mg oral capsule 2020 take 1 capsule (100 mg) by oral route every 12 hours with food for 7 days   tramadol 50 mg oral tablet 2020 take 1 tablet (50 mg) by oral route every 6 hours as needed for 5 days   Vistaril 25 mg oral capsule  1 tablet at night         Allergy List  Allergen Name Date Reaction Notes   Adhesives --  --  --    amoxicillin --  --  --    Bactrim --  --  --    PENICILLINS --  --  --          Family Medical History  Disease Name Relative/Age Notes   Family history of cancer Father/  Mother/   Lung and throat   Family history of heart disease Mother/   --          Reproductive History  Menstrual   Menopause Status: Postmenopausal Age Menopause: 49   Pregnancy Summary   Total Pregnancies: 5 Full Term: 3 Premature: 0   Ab Induced: 0 Ab Spontaneous: 0 Ectopics: 0   Multiples: 0 Livin         Social History  Finding Status Start/Stop Quantity Notes   Alcohol Never --/-- --  --    Denies illicit substance abuse --  --/-- --  --    Denies substance abuse --  --/-- --  --    Tobacco Current every day --/-- 1ppd --          Review of Systems  · Constitutional  o Denies  o : fever, fatigue, weight loss, weight gain  · Cardiovascular  o Denies  o : lower extremity edema, claudication, chest pressure, palpitations  · Respiratory  o Denies  o : shortness of breath, wheezing, cough, hemoptysis, dyspnea on exertion  · Gastrointestinal  o Denies  o : nausea, vomiting, diarrhea, constipation, abdominal  pain              Assessment  · Vaginal discharge     623.5/N89.8  · Vaginal odor     625.8/N89.8  · Bacterial vaginosis       Acute vaginitis     616.10/N76.0  Other specified bacterial agents as the cause of diseases classified elsewhere     616.10/B96.89       BV:  Flagyl 500mg PO bid x 7 days  Advised do not consume ETOH while taking   increase clear fluids  Keep clean and dry  Notify if S/S worsen or persist     Problems Reconciled  Plan  · Orders  o ACO-17: Screened for tobacco use AND received tobacco cessation intervention (4004F) - - 10/27/2020  o ACO-39: Current medications updated and reviewed (1159F, ) - - 10/27/2020  o ACO-14: Influenza immunization was not administered for reasons documented [facility] (19070) - - 10/27/2020   Declined  o Physician Telephone Evaluation, 11-20 minutes (82258) - - 10/27/2020  · Medications  o Flagyl 500 mg oral tablet   SIG: take 1 tablet (500 mg) by oral route every 12 hours for 7 days   DISP: (14) Tablet with 0 refills  Prescribed on 10/27/2020     o Macrobid 100 mg oral capsule   SIG: take 1 capsule (100 mg) by oral route every 12 hours with food for 7 days   DISP: (14) capsules with 0 refills  Discontinued on 10/27/2020     o Medications have been Reconciled  o Transition of Care or Provider Policy  · Instructions  o Plan Of Care: BV  o Chronic conditions reviewed and taken into consideration for today's treatment plan.  o Patient instructed to seek medical attention urgently for new or worsening symptoms.  o Patient was educated/instructed on their diagnosis, treatment and medications prior to discharge from the clinic today.  o Take all medications as prescribed/directed.  o Rest. Increase Fluids.  o Patient was instructed to exercise regularly.  o Call the office with any concerns or questions.  o Risks, benefits, and alternatives were discussed with the patient. The patient is aware of risks associated with: all meds and conditions.   o Discussed Covid-19  precautions including, but not limited to, social distancing, avoid touching your face, and hand washing.   o Bring all medicines with their bottles to each office visit.  o Flu vaccine declined.  · Disposition  o Call or Return if symptoms worsen or persist.  o Follow Up PRN.  o Proceed to ED for all medical emergencies.            Electronically Signed by: MRAIO Lizama -Author on October 27, 2020 12:32:25 PM

## 2021-05-14 VITALS
TEMPERATURE: 97.5 F | HEART RATE: 82 BPM | BODY MASS INDEX: 24.65 KG/M2 | DIASTOLIC BLOOD PRESSURE: 103 MMHG | WEIGHT: 144.37 LBS | HEIGHT: 64 IN | SYSTOLIC BLOOD PRESSURE: 170 MMHG | OXYGEN SATURATION: 99 %

## 2021-05-14 VITALS
SYSTOLIC BLOOD PRESSURE: 162 MMHG | BODY MASS INDEX: 24.59 KG/M2 | WEIGHT: 144 LBS | DIASTOLIC BLOOD PRESSURE: 128 MMHG | HEART RATE: 104 BPM | HEIGHT: 64 IN

## 2021-05-14 VITALS
TEMPERATURE: 97.8 F | BODY MASS INDEX: 24.59 KG/M2 | HEART RATE: 84 BPM | HEIGHT: 64 IN | OXYGEN SATURATION: 99 % | WEIGHT: 144 LBS | SYSTOLIC BLOOD PRESSURE: 145 MMHG | RESPIRATION RATE: 20 BRPM | DIASTOLIC BLOOD PRESSURE: 111 MMHG

## 2021-05-14 VITALS
HEIGHT: 64 IN | SYSTOLIC BLOOD PRESSURE: 148 MMHG | WEIGHT: 145 LBS | HEART RATE: 90 BPM | DIASTOLIC BLOOD PRESSURE: 100 MMHG | BODY MASS INDEX: 24.75 KG/M2

## 2021-05-15 VITALS
SYSTOLIC BLOOD PRESSURE: 170 MMHG | OXYGEN SATURATION: 99 % | RESPIRATION RATE: 20 BRPM | HEIGHT: 64 IN | DIASTOLIC BLOOD PRESSURE: 120 MMHG | TEMPERATURE: 97.2 F | HEART RATE: 110 BPM | BODY MASS INDEX: 25.52 KG/M2 | WEIGHT: 149.5 LBS

## 2021-05-15 VITALS
HEART RATE: 101 BPM | WEIGHT: 150 LBS | TEMPERATURE: 98.1 F | OXYGEN SATURATION: 100 % | BODY MASS INDEX: 25.61 KG/M2 | HEIGHT: 64 IN | RESPIRATION RATE: 16 BRPM | DIASTOLIC BLOOD PRESSURE: 122 MMHG | SYSTOLIC BLOOD PRESSURE: 200 MMHG

## 2021-05-15 VITALS
HEIGHT: 64 IN | BODY MASS INDEX: 26.38 KG/M2 | RESPIRATION RATE: 22 BRPM | OXYGEN SATURATION: 100 % | WEIGHT: 154.5 LBS | HEART RATE: 116 BPM | DIASTOLIC BLOOD PRESSURE: 111 MMHG | TEMPERATURE: 97.8 F | SYSTOLIC BLOOD PRESSURE: 163 MMHG

## 2021-05-15 VITALS
HEART RATE: 80 BPM | DIASTOLIC BLOOD PRESSURE: 103 MMHG | BODY MASS INDEX: 25.29 KG/M2 | RESPIRATION RATE: 20 BRPM | WEIGHT: 148.12 LBS | TEMPERATURE: 98 F | SYSTOLIC BLOOD PRESSURE: 177 MMHG | HEIGHT: 64 IN | OXYGEN SATURATION: 100 %

## 2021-05-15 VITALS
RESPIRATION RATE: 20 BRPM | HEART RATE: 84 BPM | HEIGHT: 64 IN | SYSTOLIC BLOOD PRESSURE: 160 MMHG | DIASTOLIC BLOOD PRESSURE: 91 MMHG | WEIGHT: 157 LBS | BODY MASS INDEX: 26.8 KG/M2 | OXYGEN SATURATION: 98 % | TEMPERATURE: 96.8 F

## 2021-05-15 VITALS — SYSTOLIC BLOOD PRESSURE: 132 MMHG | DIASTOLIC BLOOD PRESSURE: 94 MMHG

## 2021-05-17 ENCOUNTER — OFFICE VISIT (OUTPATIENT)
Dept: ORTHOPEDIC SURGERY | Facility: CLINIC | Age: 51
End: 2021-05-17

## 2021-05-17 VITALS — HEIGHT: 63 IN | WEIGHT: 144 LBS | TEMPERATURE: 96 F | BODY MASS INDEX: 25.52 KG/M2

## 2021-05-17 DIAGNOSIS — M79.671 RIGHT FOOT PAIN: ICD-10-CM

## 2021-05-17 DIAGNOSIS — M19.071 ARTHRITIS OF FIRST METATARSOPHALANGEAL (MTP) JOINT OF RIGHT FOOT: Primary | ICD-10-CM

## 2021-05-17 DIAGNOSIS — S93.334D: ICD-10-CM

## 2021-05-17 DIAGNOSIS — M20.41 HAMMER TOE OF RIGHT FOOT: ICD-10-CM

## 2021-05-17 PROCEDURE — 29540 STRAPPING ANKLE &/FOOT: CPT | Performed by: ORTHOPAEDIC SURGERY

## 2021-05-17 PROCEDURE — 99024 POSTOP FOLLOW-UP VISIT: CPT | Performed by: ORTHOPAEDIC SURGERY

## 2021-05-17 PROCEDURE — 73630 X-RAY EXAM OF FOOT: CPT | Performed by: ORTHOPAEDIC SURGERY

## 2021-05-17 RX ORDER — HYDROCODONE BITARTRATE AND ACETAMINOPHEN 7.5; 325 MG/1; MG/1
TABLET ORAL
Qty: 42 TABLET | Refills: 0 | Status: SHIPPED | OUTPATIENT
Start: 2021-05-17 | End: 2021-05-17 | Stop reason: SDUPTHER

## 2021-05-17 RX ORDER — HYDROCODONE BITARTRATE AND ACETAMINOPHEN 7.5; 325 MG/1; MG/1
TABLET ORAL
Qty: 42 TABLET | Refills: 0 | Status: SHIPPED | OUTPATIENT
Start: 2021-05-17 | End: 2021-05-21 | Stop reason: SDUPTHER

## 2021-05-17 NOTE — PROGRESS NOTES
"Foot Follow Up      Patient: Vanesa Escudero    YOB: 1970 51 y.o. female    Chief Complaints: Foot hurts to be getting better    History of Present Illness: Patient underwent right first MTP arthrodesis with calcaneal bone graft as well as open treatment of second MTP chronic dislocation including joint release and Weil osteotomy as well as second PIP joint fusion on 5/4/2021.    She initially had quite a bit of difficulty with pain control which has now been getting under better control.  She been using hydrocodone 7.5 mg 2 tabs p.o. every 4 hours but has been trying to stretch this out some  HPI    ROS: Foot pain  Past Medical History:   Diagnosis Date   • ADHD (attention deficit hyperactivity disorder)    • Anxiety    • Hypertension    • Kidney stone    • Right foot pain      Physical Exam:   Vitals:    05/17/21 0819   Temp: 96 °F (35.6 °C)   Weight: 65.3 kg (144 lb)   Height: 160 cm (63\")   PainSc:   5     Well developed with normal mood.  Right foot shows neutral alignment to the first and second toes there is some slight abutment of the first and second toes but I think this is mainly from swelling.  There was some diminished sensation in the toes but grossly intact light touch in the brisk capillary refill.  The pin appears to have compressed some with the pin ball pressing against the toe which was backed out about 3 or 4 mm.  There is no sign of infection or skin necrosis.  Her hindfoot incision was healing well also.      Radiology: 3 views of the right foot ordered evaluate postoperative alignment reviewed and compared with previous x-rays.  The first metatarsal phalangeal joint appears in good alignment at the fusion site where is intact.  The second MTP joint is well reduced and congruent and second metatarsal osteotomy appears to be in good alignment and second metatarsal length is symmetric to the third.  Second PIP joint is well aligned.      Assessment/Plan: Status post right foot " surgery as outlined above.    Reviewed the operative procedure in detail with her and have her continue elevation and nonweightbearing.    Sutures and staples removed and Steri-Strips were applied.  She was placed back into a sterile forefoot and ankle bunion and hammertoe spica dressing.  She will continue with her postoperative shoe for protection and continue with elevation and nonweightbearing.    We will change her to hydrocodone 7.5 mg 1-2 p.o. every 6 hours as needed for pain and she understands will continue weaning down on this.    Also again discussed with her the increased risk of potential complications with continuing to smoke which she fully understands.    She understands she will be back to work for at least 8 to 10 weeks and that the pin will stay in for another 10 to 14 days but should be rewrapped and will be putting much of any weight on this for another few weeks.    All of her questions answered to her full satisfaction we will see her back in about 2 weeks with x-rays of her right foot.

## 2021-05-20 DIAGNOSIS — M19.071 ARTHRITIS OF FIRST METATARSOPHALANGEAL (MTP) JOINT OF RIGHT FOOT: ICD-10-CM

## 2021-05-20 RX ORDER — HYDROCODONE BITARTRATE AND ACETAMINOPHEN 7.5; 325 MG/1; MG/1
TABLET ORAL
Qty: 42 TABLET | Refills: 0 | Status: CANCELLED | OUTPATIENT
Start: 2021-05-20

## 2021-05-21 DIAGNOSIS — M19.071 ARTHRITIS OF FIRST METATARSOPHALANGEAL (MTP) JOINT OF RIGHT FOOT: ICD-10-CM

## 2021-05-21 RX ORDER — HYDROCODONE BITARTRATE AND ACETAMINOPHEN 7.5; 325 MG/1; MG/1
TABLET ORAL
Qty: 42 TABLET | Refills: 0 | Status: CANCELLED | OUTPATIENT
Start: 2021-05-21

## 2021-05-21 RX ORDER — HYDROCODONE BITARTRATE AND ACETAMINOPHEN 7.5; 325 MG/1; MG/1
TABLET ORAL
Qty: 42 TABLET | Refills: 0 | Status: SHIPPED | OUTPATIENT
Start: 2021-05-21 | End: 2021-05-27 | Stop reason: ALTCHOICE

## 2021-05-21 NOTE — TELEPHONE ENCOUNTER
Caller: RUDDY REYNA    Relationship: SELF    Best call back number: [phone]    Medication needed:   Requested Prescriptions      No prescriptions requested or ordered in this encounter   HYDROcodone-acetaminophen (NORCO) 7.5-325 MG      When do you need the refill by: 05/22/21      Does the patient have less than a 3 day supply:  [x] Yes  [] No    What is the patient's preferred pharmacy:    [facility]

## 2021-05-26 ENCOUNTER — TELEPHONE (OUTPATIENT)
Dept: ORTHOPEDIC SURGERY | Facility: CLINIC | Age: 51
End: 2021-05-26

## 2021-05-26 DIAGNOSIS — M19.071 ARTHRITIS OF FIRST METATARSOPHALANGEAL (MTP) JOINT OF RIGHT FOOT: ICD-10-CM

## 2021-05-26 RX ORDER — HYDROCODONE BITARTRATE AND ACETAMINOPHEN 7.5; 325 MG/1; MG/1
TABLET ORAL
Qty: 42 TABLET | Refills: 0 | Status: CANCELLED | OUTPATIENT
Start: 2021-05-26

## 2021-05-26 NOTE — TELEPHONE ENCOUNTER
Caller: RUDDY REYNA    Relationship: SELF    Best call back number: [phone]    Medication needed: HYDROCODONE 7.5    When do you need the refill by: ASAP    What additional details did the patient provide when requesting the medication: N/A    Does the patient have less than a 3 day supply:  [x] Yes  [] No    What is the patient's preferred pharmacy: [facility]

## 2021-05-26 NOTE — TELEPHONE ENCOUNTER
I called patient after she called requesting pain medication refill she says she still been taking about every 6 hours.  She complains of pain at the plantar aspect of her heel and a feeling of swelling and some discomfort beneath her second toe and her dressing has become somewhat loose.    Discussed pain medication with her and will need to start weaning down on this.  We will have her do is come in tomorrow let me take a look at her foot check some x-rays and replace dressing if needed and will refill her pain medication at that time.

## 2021-05-26 NOTE — TELEPHONE ENCOUNTER
TRIED WARM TRANSFERRING W/NO ANSWER.       Caller: RUDDY REYNA    Relationship to patient: SELF    Best call back number: [phone]    Patient is needing: PATIENT RETURNED DR. HU'S CALL CONCERNING PRESCRIPTION REFILL.

## 2021-05-27 ENCOUNTER — OFFICE VISIT (OUTPATIENT)
Dept: ORTHOPEDIC SURGERY | Facility: CLINIC | Age: 51
End: 2021-05-27

## 2021-05-27 VITALS — WEIGHT: 144 LBS | TEMPERATURE: 98 F | HEIGHT: 63 IN | BODY MASS INDEX: 25.52 KG/M2

## 2021-05-27 DIAGNOSIS — M19.071 ARTHRITIS OF FIRST METATARSOPHALANGEAL (MTP) JOINT OF RIGHT FOOT: Primary | ICD-10-CM

## 2021-05-27 DIAGNOSIS — S93.334D: ICD-10-CM

## 2021-05-27 DIAGNOSIS — M20.41 HAMMER TOE OF RIGHT FOOT: ICD-10-CM

## 2021-05-27 PROCEDURE — 73630 X-RAY EXAM OF FOOT: CPT | Performed by: ORTHOPAEDIC SURGERY

## 2021-05-27 PROCEDURE — 99024 POSTOP FOLLOW-UP VISIT: CPT | Performed by: ORTHOPAEDIC SURGERY

## 2021-05-27 RX ORDER — HYDROCODONE BITARTRATE AND ACETAMINOPHEN 5; 325 MG/1; MG/1
1 TABLET ORAL EVERY 6 HOURS PRN
Qty: 45 TABLET | Refills: 0 | Status: SHIPPED | OUTPATIENT
Start: 2021-05-27 | End: 2021-06-01 | Stop reason: SDUPTHER

## 2021-05-27 NOTE — PROGRESS NOTES
"Foot Follow Up      Patient: Vanesa Escudero    YOB: 1970 51 y.o. female    Chief Complaints: Foot hurts    History of Present Illness:Patient underwent right first MTP arthrodesis with calcaneal bone graft as well as open treatment of second MTP chronic dislocation including joint release and Weil osteotomy as well as second PIP joint fusion on 5/4/2021.    She was last seen on 5/17/2021 was going to come back on 6/3/2021 however she called yesterday stating she is having quite a bit of pain the plantar aspect of her heel and beneath her second toe.  She is brought in today for further evaluation.  She been using hydrocodone 7.5 mg 2 tablets about every 4-6 hours.  She has not had any injury and has been remaining nonweightbearing  HPI    ROS: Foot pain no fevers chills chest pain or shortness of breath  Past Medical History:   Diagnosis Date   • ADHD (attention deficit hyperactivity disorder)    • Anxiety    • Hypertension    • Kidney stone    • Right foot pain      Physical Exam:   Vitals:    05/27/21 1319   Temp: 98 °F (36.7 °C)   Weight: 65.3 kg (144 lb)   Height: 160 cm (63\")   PainSc:   5     Well developed with normal mood.  Right foot actually looks quite good.  There is mild swelling to the first and second toes.  Pin was intact and pin site was clear without sign of infection.  There was brisk capillary refill to the toes lateral hindfoot wound was healing without sign of infection.  There was no skin breakdown of the plantar lateral aspect of her heel and none to the base of the second toe.      Radiology: 3 views of the right foot ordered evaluate pain and alignment reviewed and compared with previous x-rays.  The first MTP fusion appears to remain well aligned hardware is intact.  The pin remains well contained in the second metatarsal and does not appear to be changed in alignment compared with previous x-rays other than what we backed it out a little bit at her last visit PIP and MTP " joints of the second toes appear well aligned as does the second metatarsal osteotomy.        Assessment/Plan: Status post right foot surgery as outlined above.    Reviewed with her that her foot x-ray looks remarkably good for the extent of surgery and pathology thinks that she had is only about 3 weeks out.    Her Steri-Strips were removed and wounds actually look quite good without any sign of infection.    Counseled her to continue with elevation and nonweightbearing and she will use a sock over her foot to keep her from snagging her pin.    We will back her down to Lortab 5 mg 1-2 p.o. every 6 hours as needed for mild to moderate pain and we will see her back next week as scheduled.  We will plan on removing her pin and rewrapping her and allowing some partial weightbearing.

## 2021-06-01 ENCOUNTER — TELEPHONE (OUTPATIENT)
Dept: ORTHOPEDIC SURGERY | Facility: CLINIC | Age: 51
End: 2021-06-01

## 2021-06-01 DIAGNOSIS — S93.334D: ICD-10-CM

## 2021-06-01 DIAGNOSIS — M19.071 ARTHRITIS OF FIRST METATARSOPHALANGEAL (MTP) JOINT OF RIGHT FOOT: ICD-10-CM

## 2021-06-01 DIAGNOSIS — M20.41 HAMMER TOE OF RIGHT FOOT: ICD-10-CM

## 2021-06-01 NOTE — TELEPHONE ENCOUNTER
LOV 05/27/2021     right first MTP arthrodesis with calcaneal bone graft as well as open treatment of second MTP chronic dislocation including joint release and Weil osteotomy as well as second PIP joint fusion on 5/4/2021.    LAST RX 05/27/2021 #45

## 2021-06-01 NOTE — TELEPHONE ENCOUNTER
I called and spoke with patient after receiving pain medicine request refill.  She says she will probably run out tomorrow she has been taking the Lortab 5 mg every 6 hours she said sometimes 1 and sometimes 2.    Counseled her to continue weaning down on these and will double check her Dionicio and if okay then refill this again with the understanding that going forward we will continue weaning down on this.  She voiced clear understanding of this and was in agreement with it and all of her questions answered to her full satisfaction.

## 2021-06-02 DIAGNOSIS — S93.334D: ICD-10-CM

## 2021-06-02 DIAGNOSIS — M20.41 HAMMER TOE OF RIGHT FOOT: ICD-10-CM

## 2021-06-02 DIAGNOSIS — M19.071 ARTHRITIS OF FIRST METATARSOPHALANGEAL (MTP) JOINT OF RIGHT FOOT: ICD-10-CM

## 2021-06-02 RX ORDER — HYDROCODONE BITARTRATE AND ACETAMINOPHEN 5; 325 MG/1; MG/1
1 TABLET ORAL EVERY 6 HOURS PRN
Qty: 45 TABLET | Refills: 0 | Status: SHIPPED | OUTPATIENT
Start: 2021-06-02 | End: 2021-06-02 | Stop reason: SDUPTHER

## 2021-06-02 RX ORDER — HYDROCODONE BITARTRATE AND ACETAMINOPHEN 5; 325 MG/1; MG/1
1 TABLET ORAL EVERY 6 HOURS PRN
Qty: 45 TABLET | Refills: 0 | Status: SHIPPED | OUTPATIENT
Start: 2021-06-02 | End: 2021-06-17

## 2021-06-03 ENCOUNTER — OFFICE VISIT (OUTPATIENT)
Dept: ORTHOPEDIC SURGERY | Facility: CLINIC | Age: 51
End: 2021-06-03

## 2021-06-03 VITALS — BODY MASS INDEX: 25.52 KG/M2 | WEIGHT: 144 LBS | TEMPERATURE: 97.3 F | HEIGHT: 63 IN

## 2021-06-03 DIAGNOSIS — M20.41 HAMMER TOE OF RIGHT FOOT: ICD-10-CM

## 2021-06-03 DIAGNOSIS — M19.071 ARTHRITIS OF FIRST METATARSOPHALANGEAL (MTP) JOINT OF RIGHT FOOT: Primary | ICD-10-CM

## 2021-06-03 DIAGNOSIS — S93.334D: ICD-10-CM

## 2021-06-03 PROCEDURE — 99024 POSTOP FOLLOW-UP VISIT: CPT | Performed by: ORTHOPAEDIC SURGERY

## 2021-06-03 PROCEDURE — 29540 STRAPPING ANKLE &/FOOT: CPT | Performed by: ORTHOPAEDIC SURGERY

## 2021-06-03 PROCEDURE — 73630 X-RAY EXAM OF FOOT: CPT | Performed by: ORTHOPAEDIC SURGERY

## 2021-06-03 NOTE — PROGRESS NOTES
"Foot Follow Up      Patient: Vanesa Escudero    YOB: 1970 51 y.o. female    Chief Complaints: Foot getting better    History of Present Illness:Patient underwent right first MTP arthrodesis with calcaneal bone graft as well as open treatment of second MTP chronic dislocation including joint release and Weil osteotomy as well as second PIP joint fusion on 5/4/2021.    She was last seen on 5/27/2021 and has been nonweightbearing.  Her pain has been improving although spoke with her last week and she was still using Lortab 5 mg 1-2 p.o. every 6 hours as needed for pain.    She does feel like her pain is improving with a slight ache in her toes rated today at 3 out of 10.  HPI    ROS: Foot pain  Past Medical History:   Diagnosis Date   • ADHD (attention deficit hyperactivity disorder)    • Anxiety    • Hypertension    • Kidney stone    • Right foot pain      Physical Exam:   Vitals:    06/03/21 0902   Temp: 97.3 °F (36.3 °C)   TempSrc: Temporal   Weight: 65.3 kg (144 lb)   Height: 160 cm (63\")     Well developed with normal mood.  On examination of her right foot her toes there is mild swelling and incisions are healing without sign of infection.  There is neutral alignment of the first and second toes with brisk capillary refill.  Pin site was without sign of infection.  Lateral hindfoot incision is healed      Radiology: 3 views of the right foot ordered evaluate postoperative alignment reviewed and compared to previous x-rays.  The first MTP fusion site appears to be intact and the second metatarsal osteotomy appears to be healing.  There is no change in alignment to the second PIP or MTP joint and hardware appears to remain well contained.      Assessment/Plan:  Status post right foot surgery as outlined above    Overall she seems to be pleased with her outcome and said her toes look much better and pain is improving.    Her pin was removed without difficulty and hemostasis was obtained at the pin " site.  She was placed back into a sterile dressing with forefoot ankle bunion hammertoe wrap.    She was fitted with a forefoot offloading shoe today and may do some partial foot flat weightbearing with walker or crutches which was described in detail for her today otherwise will use her scooter    She will continue weaning down on her pain medication and discussed careful use of this    We will see her back in 2 weeks with x-rays of her right foot

## 2021-06-17 ENCOUNTER — OFFICE VISIT (OUTPATIENT)
Dept: ORTHOPEDIC SURGERY | Facility: CLINIC | Age: 51
End: 2021-06-17

## 2021-06-17 VITALS — BODY MASS INDEX: 25.52 KG/M2 | HEIGHT: 63 IN | WEIGHT: 144 LBS | TEMPERATURE: 97.5 F

## 2021-06-17 DIAGNOSIS — M19.071 ARTHRITIS OF FIRST METATARSOPHALANGEAL (MTP) JOINT OF RIGHT FOOT: ICD-10-CM

## 2021-06-17 DIAGNOSIS — S93.334D: Primary | ICD-10-CM

## 2021-06-17 DIAGNOSIS — M20.41 HAMMER TOE OF RIGHT FOOT: ICD-10-CM

## 2021-06-17 PROCEDURE — 73630 X-RAY EXAM OF FOOT: CPT | Performed by: ORTHOPAEDIC SURGERY

## 2021-06-17 PROCEDURE — 99024 POSTOP FOLLOW-UP VISIT: CPT | Performed by: ORTHOPAEDIC SURGERY

## 2021-06-17 RX ORDER — DEXTROAMPHETAMINE SACCHARATE, AMPHETAMINE ASPARTATE, DEXTROAMPHETAMINE SULFATE AND AMPHETAMINE SULFATE 2.5; 2.5; 2.5; 2.5 MG/1; MG/1; MG/1; MG/1
TABLET ORAL
COMMUNITY

## 2021-06-17 RX ORDER — BUPRENORPHINE HYDROCHLORIDE AND NALOXONE HYDROCHLORIDE DIHYDRATE 8; 2 MG/1; MG/1
TABLET SUBLINGUAL
COMMUNITY
Start: 2021-06-15 | End: 2021-08-03

## 2021-06-17 NOTE — PROGRESS NOTES
"Foot Follow Up      Patient: Vanesa Escudero    YOB: 1970 51 y.o. female    Chief Complaints: Foot doing okay    History of Present Illness:Patient underwent right first MTP arthrodesis with calcaneal bone graft as well as open treatment of second MTP chronic dislocation including joint release and Weil osteotomy as well as second PIP joint fusion on 5/4/2021.    She was last seen on 6/3/2021 and her pain was improving with only a slight ache in her toes.  She was instructed with a forefoot off  shoe and allowed to do some partial foot flat weightbearing with walker or crutches otherwise use her scooter and foot was rewrapped.    She said the wrap came off about 4 days ago and has not been using crutches but has been walking mainly on the heel.  She states that her pain has been improving gets only maybe a slight occasional ache but feels like her swelling is improving as well.  HPI    ROS: Foot pain  Past Medical History:   Diagnosis Date   • ADHD (attention deficit hyperactivity disorder)    • Anxiety    • Hypertension    • Kidney stone    • Right foot pain      Physical Exam:   Vitals:    06/17/21 1145   Temp: 97.5 °F (36.4 °C)   Weight: 65.3 kg (144 lb)   Height: 160 cm (63\")   PainSc:   2     Well developed with normal mood.  Right foot shows neutral alignment of the first and second toes with only mild swelling.  Her incisions are healing without sign of infection but I did remove one small piece of Vicryl from the dorsum of her first MTP wound without sign of infection.  She was able to flex and extend at the IP joint of the great toe to some degree.      Radiology: 3 views of the right foot ordered evaluate postoperative alignment reviewed and compared to previous x-rays.  The first MTP fusion site appears to be healing in good alignment the second metatarsal osteotomy is without change in alignment appears to be healing and MTP joints are congruent.      Assessment/Plan: Status post " "right foot surgery as outlined above.    Overall she does seem to be improving and will allow her to transition to a traditional postoperative shoe but recommend that she use a cane to offload this and placing weight mainly towards the heel more so than the forefoot but not to \"walk on the heel\" but not to rock up on her toes.    She may let this get wet in the shower but understands not to immerse it and if anything worsens let me know otherwise I will see her back in 3 weeks with x-rays of her right foot.    "

## 2021-07-08 ENCOUNTER — OFFICE VISIT (OUTPATIENT)
Dept: ORTHOPEDIC SURGERY | Facility: CLINIC | Age: 51
End: 2021-07-08

## 2021-07-08 VITALS — BODY MASS INDEX: 25.52 KG/M2 | WEIGHT: 144 LBS | TEMPERATURE: 98.4 F | HEIGHT: 63 IN

## 2021-07-08 DIAGNOSIS — R52 PAIN: Primary | ICD-10-CM

## 2021-07-08 DIAGNOSIS — M20.41 HAMMER TOE OF RIGHT FOOT: ICD-10-CM

## 2021-07-08 DIAGNOSIS — S93.334D: ICD-10-CM

## 2021-07-08 DIAGNOSIS — M19.071 ARTHRITIS OF FIRST METATARSOPHALANGEAL (MTP) JOINT OF RIGHT FOOT: ICD-10-CM

## 2021-07-08 PROCEDURE — 73630 X-RAY EXAM OF FOOT: CPT | Performed by: ORTHOPAEDIC SURGERY

## 2021-07-08 PROCEDURE — 99024 POSTOP FOLLOW-UP VISIT: CPT | Performed by: ORTHOPAEDIC SURGERY

## 2021-07-08 RX ORDER — CLONIDINE HYDROCHLORIDE 0.1 MG/1
TABLET ORAL
COMMUNITY
Start: 2021-04-29 | End: 2022-11-10

## 2021-07-08 RX ORDER — BUSPIRONE HYDROCHLORIDE 5 MG/1
TABLET ORAL
COMMUNITY
Start: 2021-06-02 | End: 2022-11-10

## 2021-07-08 RX ORDER — TRAZODONE HYDROCHLORIDE 50 MG/1
TABLET ORAL
COMMUNITY
Start: 2021-06-02 | End: 2022-11-10

## 2021-07-08 NOTE — PROGRESS NOTES
"Foot Follow Up      Patient: Vanesa Escudero    YOB: 1970 51 y.o. female    Chief Complaints: Foot doing okay but swells    History of Present Illness:Patient underwent right first MTP arthrodesis with calcaneal bone graft as well as open treatment of second MTP chronic dislocation including joint release and Weil osteotomy as well as second PIP joint fusion on 5/4/2021.    She was last seen on 6/17/2021 and her rapid come off about 4 days prior and she been walking mainly on her heel and pain was improving with only a slight occasional ache and swelling was improving at that time.  She was transitioned to a traditional postoperative shoe and recommend that she offload with a cane with weight mainly toward her heel lateral forefoot but not to \"walk on her heel\".    She is seen back today stating that her foot is doing okay but after she is on it for several hours she gets quite a bit of swelling in her first and second toes but is not had any warmth erythema.  She has only a slight ache  HPI    ROS: Foot pain  Past Medical History:   Diagnosis Date   • ADHD (attention deficit hyperactivity disorder)    • Anxiety    • Hypertension    • Kidney stone    • Right foot pain      Physical Exam:   Vitals:    07/08/21 1126   Temp: 98.4 °F (36.9 °C)   Weight: 65.3 kg (144 lb)   Height: 160 cm (63\")   PainSc:   5     Well developed with normal mood.  Right foot shows mild swelling to the first and second toes and some to the foot.  Right calf was completely nontender and without swelling or sign of DVT.  Incisions are well-healed and there was no sign of infection.      Radiology: 3 views of the right foot ordered evaluate postoperative alignment reviewed and compared to previous x-rays.  The first MTP fusion site remains in good alignment and appears to be healing hardware is intact.  There is good alignment of the second MTP and PIP joints without change compared to previous x-rays.  Second metatarsal " osteotomy appears to be healing      Assessment/Plan:  Status post right foot surgery as outlined above.    Reviewed her that I do not see any sign of infection and fusion appears to be healing well.    Swelling should improve some over time but what we are going to do is had her fitted with a bunion compression silicone sleeve to help with swelling for daytime use.    She will continue with her postoperative shoe and apply Voltaren gel to the area twice daily.    We will see her back in 4 weeks with x-rays of her right foot

## 2021-07-14 RX ORDER — HYDROCHLOROTHIAZIDE 12.5 MG/1
CAPSULE, GELATIN COATED ORAL
Qty: 30 CAPSULE | Refills: 3 | Status: SHIPPED | OUTPATIENT
Start: 2021-07-14 | End: 2021-12-03

## 2021-07-14 RX ORDER — LOSARTAN POTASSIUM 100 MG/1
100 TABLET ORAL DAILY
Qty: 30 TABLET | Refills: 3 | Status: SHIPPED | OUTPATIENT
Start: 2021-07-14 | End: 2022-11-10 | Stop reason: SINTOL

## 2021-08-03 ENCOUNTER — OFFICE VISIT (OUTPATIENT)
Dept: FAMILY MEDICINE CLINIC | Facility: CLINIC | Age: 51
End: 2021-08-03

## 2021-08-03 VITALS
SYSTOLIC BLOOD PRESSURE: 104 MMHG | OXYGEN SATURATION: 99 % | BODY MASS INDEX: 29.43 KG/M2 | TEMPERATURE: 96.6 F | WEIGHT: 166.1 LBS | HEART RATE: 57 BPM | DIASTOLIC BLOOD PRESSURE: 68 MMHG | HEIGHT: 63 IN | RESPIRATION RATE: 16 BRPM

## 2021-08-03 DIAGNOSIS — M54.16 LUMBAR BACK PAIN WITH RADICULOPATHY AFFECTING RIGHT LOWER EXTREMITY: Primary | ICD-10-CM

## 2021-08-03 DIAGNOSIS — M25.511 CHRONIC RIGHT SHOULDER PAIN: ICD-10-CM

## 2021-08-03 DIAGNOSIS — R53.83 FATIGUE, UNSPECIFIED TYPE: ICD-10-CM

## 2021-08-03 DIAGNOSIS — R35.1 NOCTURIA: ICD-10-CM

## 2021-08-03 DIAGNOSIS — M25.50 POLYARTHRALGIA: ICD-10-CM

## 2021-08-03 DIAGNOSIS — Z79.899 MEDICATION MANAGEMENT: ICD-10-CM

## 2021-08-03 DIAGNOSIS — G89.29 CHRONIC RIGHT SHOULDER PAIN: ICD-10-CM

## 2021-08-03 PROCEDURE — 99213 OFFICE O/P EST LOW 20 MIN: CPT | Performed by: NURSE PRACTITIONER

## 2021-08-03 PROCEDURE — 96372 THER/PROPH/DIAG INJ SC/IM: CPT | Performed by: NURSE PRACTITIONER

## 2021-08-03 RX ORDER — METHYLPREDNISOLONE 4 MG/1
TABLET ORAL
Qty: 21 TABLET | Refills: 0 | Status: SHIPPED | OUTPATIENT
Start: 2021-08-03 | End: 2021-09-17

## 2021-08-03 RX ORDER — METHYLPREDNISOLONE ACETATE 40 MG/ML
40 INJECTION, SUSPENSION INTRA-ARTICULAR; INTRALESIONAL; INTRAMUSCULAR; SOFT TISSUE ONCE
Status: COMPLETED | OUTPATIENT
Start: 2021-08-03 | End: 2021-08-03

## 2021-08-03 RX ADMIN — METHYLPREDNISOLONE ACETATE 40 MG: 40 INJECTION, SUSPENSION INTRA-ARTICULAR; INTRALESIONAL; INTRAMUSCULAR; SOFT TISSUE at 16:01

## 2021-08-03 NOTE — PROGRESS NOTES
"Chief Complaint  Leg Pain (back of both legs all the way down the the feet x3wks) and Med Refill    Subjective          Vanesa Escudero presents to [facility]  Pain in low back/buttock into back of leg and into foot. Started since she had a stint placed and occurred at that time.  Previous kidney stone started as pain in back/legs, back pain is constant with flare up. States she will lay in bed, heating pad with little relief.  Previous back surgery, L4     Foot surgery with Dr. Reed, to repair ligaments in toe, didn't address it for 8-9 months, bunyon removed on right as well          Leg Pain         Objective   Vital Signs:   /68   Pulse 57   Temp 96.6 °F (35.9 °C) (Temporal)   Resp 16   Ht 160 cm (63\")   Wt 75.3 kg (166 lb 1.6 oz)   SpO2 99%   BMI 29.42 kg/m²     Physical Exam  Vitals reviewed.   Constitutional:       Appearance: Normal appearance.   Cardiovascular:      Rate and Rhythm: Normal rate and regular rhythm.      Heart sounds: No murmur heard.   No friction rub. No gallop.    Pulmonary:      Effort: Pulmonary effort is normal. No respiratory distress.      Breath sounds: Normal breath sounds. No wheezing, rhonchi or rales.   Abdominal:      General: Bowel sounds are normal. There is no distension.      Palpations: Abdomen is soft. There is no mass.      Tenderness: There is no abdominal tenderness.      Hernia: No hernia is present.   Musculoskeletal:      Right shoulder: No swelling, effusion or tenderness. Decreased range of motion. Normal strength.      Lumbar back: Tenderness (midline) present. Normal range of motion.      Comments:      Skin:     General: Skin is warm and dry.   Neurological:      Mental Status: She is alert and oriented to person, place, and time.   Psychiatric:         Mood and Affect: Mood normal.        Result Review :                 Assessment and Plan    Diagnoses and all orders for this visit:    1. Lumbar back pain with " radiculopathy affecting right lower extremity (Primary)  -     methylPREDNISolone acetate (DEPO-medrol) injection 40 mg  -     methylPREDNISolone (MEDROL) 4 MG dose pack; Take as directed on package instructions.  Dispense: 21 tablet; Refill: 0  -     CBC & Differential  -     C-reactive protein  -     Sedimentation rate, automated  -     Ambulatory Referral to Physical Therapy Evaluate and treat  -     Urine Culture - Urine, Urine, Clean Catch    2. Nocturia  -     POC Urinalysis Dipstick, Automated    3. Medication management  -     CBC & Differential  -     Basic metabolic panel    4. Chronic right shoulder pain  -     Ambulatory Referral to Physical Therapy Evaluate and treat    5. Fatigue, unspecified type  -     Vitamin B12; Future  -     Vitamin D 25 hydroxy; Future  -     Vitamin D 25 hydroxy  -     Vitamin B12    6. Polyarthralgia  -     ARVIN        Follow Up   No follow-ups on file.  Patient was given instructions and counseling regarding her condition or for health maintenance advice. Please see specific information pulled into the AVS if appropriate.     Patient with increased leg pain/back pain  Previous symptoms were consistent with UTI/kidney stone  She denies flank pain, afebrile, no hematuria  UA does have trace bacteria, will await culture  Do recommend steroid injection followed by dose pack due to neuropathy in lower extremities, labs today to monitor inflammatory markers, will add ADA  She does have chronic right shoulder pain and would benefit from physical therapy for that and back pain  Increased fatigue, will check vit d and b12

## 2021-08-04 DIAGNOSIS — N28.9 FUNCTION KIDNEY DECREASED: Primary | ICD-10-CM

## 2021-08-04 DIAGNOSIS — M25.50 POLYARTHRALGIA: ICD-10-CM

## 2021-08-04 LAB
BASOPHILS # BLD AUTO: 0.04 10*3/MM3 (ref 0–0.2)
BASOPHILS NFR BLD AUTO: 0.4 % (ref 0–1.5)
BILIRUB BLD-MCNC: ABNORMAL MG/DL
BUN SERPL-MCNC: 21 MG/DL (ref 6–20)
BUN/CREAT SERPL: 17.4 (ref 7–25)
CALCIUM SERPL-MCNC: 9.7 MG/DL (ref 8.6–10.5)
CHLORIDE SERPL-SCNC: 101 MMOL/L (ref 98–107)
CLARITY, POC: CLEAR
CO2 SERPL-SCNC: 24.6 MMOL/L (ref 22–29)
COLOR UR: ABNORMAL
CREAT SERPL-MCNC: 1.21 MG/DL (ref 0.57–1)
CRP SERPL-MCNC: 0.85 MG/DL (ref 0–0.5)
EOSINOPHIL # BLD AUTO: 0.28 10*3/MM3 (ref 0–0.4)
EOSINOPHIL NFR BLD AUTO: 2.9 % (ref 0.3–6.2)
ERYTHROCYTE [DISTWIDTH] IN BLOOD BY AUTOMATED COUNT: 13.9 % (ref 12.3–15.4)
ERYTHROCYTE [SEDIMENTATION RATE] IN BLOOD BY WESTERGREN METHOD: 26 MM/HR (ref 0–20)
GLUCOSE SERPL-MCNC: 81 MG/DL (ref 65–99)
GLUCOSE UR STRIP-MCNC: NEGATIVE MG/DL
HCT VFR BLD AUTO: 35.4 % (ref 34–46.6)
HGB BLD-MCNC: 11.8 G/DL (ref 12–15.9)
IMM GRANULOCYTES # BLD AUTO: 0.05 10*3/MM3 (ref 0–0.05)
IMM GRANULOCYTES NFR BLD AUTO: 0.5 % (ref 0–0.5)
KETONES UR QL: ABNORMAL
LEUKOCYTE EST, POC: ABNORMAL
LYMPHOCYTES # BLD AUTO: 2.49 10*3/MM3 (ref 0.7–3.1)
LYMPHOCYTES NFR BLD AUTO: 25.6 % (ref 19.6–45.3)
MCH RBC QN AUTO: 28.8 PG (ref 26.6–33)
MCHC RBC AUTO-ENTMCNC: 33.3 G/DL (ref 31.5–35.7)
MCV RBC AUTO: 86.3 FL (ref 79–97)
MONOCYTES # BLD AUTO: 0.86 10*3/MM3 (ref 0.1–0.9)
MONOCYTES NFR BLD AUTO: 8.8 % (ref 5–12)
NEUTROPHILS # BLD AUTO: 6.02 10*3/MM3 (ref 1.7–7)
NEUTROPHILS NFR BLD AUTO: 61.8 % (ref 42.7–76)
NITRITE UR-MCNC: POSITIVE MG/ML
NRBC BLD AUTO-RTO: 0 /100 WBC (ref 0–0.2)
PH UR: 5 [PH] (ref 5–8)
PLATELET # BLD AUTO: 343 10*3/MM3 (ref 140–450)
POTASSIUM SERPL-SCNC: 4.5 MMOL/L (ref 3.5–5.2)
PROT UR STRIP-MCNC: NEGATIVE MG/DL
RBC # BLD AUTO: 4.1 10*6/MM3 (ref 3.77–5.28)
RBC # UR STRIP: NEGATIVE /UL
SODIUM SERPL-SCNC: 139 MMOL/L (ref 136–145)
SP GR UR: 1.02 (ref 1–1.03)
UROBILINOGEN UR QL: NORMAL
WBC # BLD AUTO: 9.74 10*3/MM3 (ref 3.4–10.8)

## 2021-08-05 LAB
BACTERIA UR CULT: ABNORMAL
BACTERIA UR CULT: ABNORMAL
OTHER ANTIBIOTIC SUSC ISLT: ABNORMAL

## 2021-08-05 RX ORDER — NITROFURANTOIN 25; 75 MG/1; MG/1
100 CAPSULE ORAL 2 TIMES DAILY
Qty: 14 CAPSULE | Refills: 0 | Status: SHIPPED | OUTPATIENT
Start: 2021-08-05 | End: 2021-09-17

## 2021-08-06 LAB
25(OH)D3+25(OH)D2 SERPL-MCNC: 37.7 NG/ML (ref 30–100)
ANA SER QL: NEGATIVE
Lab: NORMAL
VIT B12 SERPL-MCNC: 561 PG/ML (ref 211–946)
WRITTEN AUTHORIZATION: NORMAL

## 2021-08-09 ENCOUNTER — OFFICE VISIT (OUTPATIENT)
Dept: ORTHOPEDIC SURGERY | Facility: CLINIC | Age: 51
End: 2021-08-09

## 2021-08-09 VITALS — WEIGHT: 164 LBS | TEMPERATURE: 97.5 F | HEIGHT: 64 IN | BODY MASS INDEX: 28 KG/M2

## 2021-08-09 DIAGNOSIS — R52 PAIN: Primary | ICD-10-CM

## 2021-08-09 DIAGNOSIS — M19.071 ARTHRITIS OF FIRST METATARSOPHALANGEAL (MTP) JOINT OF RIGHT FOOT: ICD-10-CM

## 2021-08-09 DIAGNOSIS — M20.41 HAMMER TOE OF RIGHT FOOT: ICD-10-CM

## 2021-08-09 DIAGNOSIS — S93.334D: ICD-10-CM

## 2021-08-09 PROCEDURE — 99213 OFFICE O/P EST LOW 20 MIN: CPT | Performed by: ORTHOPAEDIC SURGERY

## 2021-08-09 PROCEDURE — 73630 X-RAY EXAM OF FOOT: CPT | Performed by: ORTHOPAEDIC SURGERY

## 2021-08-09 RX ORDER — DEXTROAMPHETAMINE SACCHARATE, AMPHETAMINE ASPARTATE, DEXTROAMPHETAMINE SULFATE AND AMPHETAMINE SULFATE 5; 5; 5; 5 MG/1; MG/1; MG/1; MG/1
TABLET ORAL
COMMUNITY
Start: 2021-08-04 | End: 2022-11-10

## 2021-08-09 NOTE — PROGRESS NOTES
"Foot Follow Up      Patient: Vanesa Escudero    YOB: 1970 51 y.o. female    Chief Complaints: Foot getting better\" looks good\"    History of Present Illness:Patient underwent right first MTP arthrodesis with calcaneal bone graft as well as open treatment of second MTP chronic dislocation including joint release and Weil osteotomy as well as second PIP joint fusion on 5/4/2021.    She was last seen on 7/8/2021 at which time her foot was improving but still had quite a bit of swelling when she was on it for several hours in the first and second toes but not had any sign of infection only some slight aching.    She was fitted with a silicone bunion compression sleeve  and today reports that she transitioned out of her postoperative shoe and has been in athletic shoes or sandals for the last 10 days.  She is overall doing well but complains of a lot of aches and pains throughout her body and was seen by her primary care provider and given a Medrol Dosepak and is starting physical therapy.  She has minimal complaints of pain in her foot and swelling has improved      HPI    ROS: Foot pain  Past Medical History:   Diagnosis Date   • ADHD (attention deficit hyperactivity disorder)    • Anxiety    • Hypertension    • Kidney stone    • Right foot pain      Physical Exam:   Vitals:    08/09/21 1053   Temp: 97.5 °F (36.4 °C)   Weight: 74.4 kg (164 lb)   Height: 162.6 cm (64\")     Well developed with normal mood.  Exam she is in sandals.  Her first and second toes of the right foot are in neutral alignment but without focal discomfort around the first or second MTP joints.  There is mild swelling no signs of infection.  Toes demonstrate brisk capillary refill      Radiology: 3 views of the right foot ordered evaluate postoperative alignment reviewed and compared to previous x-rays.  The second MTP joint remains reduced and without change in alignment compared to previous x-rays metatarsal osteotomy appears to be " "in good alignment and there is good alignment across the first MTP fusion hardware remains intact.    Assessment/Plan: Status post right foot surgery as outlined above.    Overall her foot seems to be doing well and she is pleased with her outcome and feels that it \"looks good and is feeling better.    Recommend that she use a sturdy stiff athletic shoes rather than sandals at this point and demonstrated heel cord stretching to do at least 4 times a day.  Instruction sheet was provided and demonstrated for her.    Anything worsens to let me know otherwise I will see her back in 4 to 6 weeks with x-rays of her right foot    "

## 2021-09-17 ENCOUNTER — OFFICE VISIT (OUTPATIENT)
Dept: FAMILY MEDICINE CLINIC | Facility: CLINIC | Age: 51
End: 2021-09-17

## 2021-09-17 VITALS
HEIGHT: 64 IN | OXYGEN SATURATION: 98 % | TEMPERATURE: 97.4 F | DIASTOLIC BLOOD PRESSURE: 90 MMHG | HEART RATE: 111 BPM | BODY MASS INDEX: 28 KG/M2 | WEIGHT: 164 LBS | SYSTOLIC BLOOD PRESSURE: 140 MMHG

## 2021-09-17 DIAGNOSIS — R11.2 NON-INTRACTABLE VOMITING WITH NAUSEA, UNSPECIFIED VOMITING TYPE: ICD-10-CM

## 2021-09-17 DIAGNOSIS — R50.9 FEVER, UNSPECIFIED FEVER CAUSE: Primary | ICD-10-CM

## 2021-09-17 DIAGNOSIS — Z20.822 EXPOSURE TO COVID-19 VIRUS: ICD-10-CM

## 2021-09-17 LAB
BILIRUB BLD-MCNC: ABNORMAL MG/DL
CLARITY, POC: ABNORMAL
COLOR UR: ABNORMAL
GLUCOSE UR STRIP-MCNC: NEGATIVE MG/DL
KETONES UR QL: ABNORMAL
LEUKOCYTE EST, POC: NEGATIVE
NITRITE UR-MCNC: NEGATIVE MG/ML
PH UR: 5.5 [PH] (ref 5–8)
PROT UR STRIP-MCNC: ABNORMAL MG/DL
RBC # UR STRIP: NEGATIVE /UL
SP GR UR: 1.02 (ref 1–1.03)
UROBILINOGEN UR QL: NORMAL

## 2021-09-17 PROCEDURE — 99213 OFFICE O/P EST LOW 20 MIN: CPT | Performed by: NURSE PRACTITIONER

## 2021-09-17 RX ORDER — ONDANSETRON 4 MG/1
4 TABLET, FILM COATED ORAL EVERY 8 HOURS PRN
Qty: 30 TABLET | Refills: 0 | Status: SHIPPED | OUTPATIENT
Start: 2021-09-17 | End: 2021-12-03

## 2021-09-17 NOTE — PROGRESS NOTES
"Chief Complaint  Fever (covid exposed at work started wed)    Subjective     {Problem List  Visit Diagnosis   Encounters  Notes  Medications  Labs  Result Review Imaging  Media :23}     Vanesa Escudero presents to [facility]  Works at [employer]  Feels like malu is fighting infection  Symptoms started on Wednesday  Reports decreased oral intake  Decreased appetite  Denies loss of taste or smell  Returned to work in the past 3 days  Not currently wearing masks at work  Denies burning/discomfort with urination  Reports belly pain generalized upper abdomen, right sided, vomiting, unrelated to meals      Fever   This is a new problem. Episode onset: x 2 days. The problem occurs intermittently. The problem has been unchanged. The temperature was taken using an oral thermometer. Associated symptoms include abdominal pain (upper abdomen), headaches, nausea and vomiting. Pertinent negatives include no chest pain, congestion, coughing, diarrhea, ear pain, muscle aches, rash, sleepiness, sore throat, urinary pain or wheezing. Associated symptoms comments: Runny nose  . She has tried nothing for the symptoms. The treatment provided no relief.       Objective   Vital Signs:   /90   Pulse 111   Temp 97.4 °F (36.3 °C)   Ht 162.6 cm (64\")   Wt 74.4 kg (164 lb)   SpO2 98%   BMI 28.15 kg/m²     Physical Exam  Vitals reviewed.   Constitutional:       Appearance: She is ill-appearing (mild) and diaphoretic. She is not toxic-appearing.   HENT:      Right Ear: Tympanic membrane and ear canal normal.      Left Ear: Tympanic membrane and ear canal normal.      Nose: Nose normal.      Mouth/Throat:      Mouth: Mucous membranes are dry.      Pharynx: No posterior oropharyngeal erythema.   Cardiovascular:      Rate and Rhythm: Regular rhythm. Tachycardia present.      Heart sounds: No murmur heard.   No friction rub. No gallop.    Pulmonary:      Effort: Pulmonary effort is normal. No " respiratory distress.      Breath sounds: Normal breath sounds. No wheezing, rhonchi or rales.   Abdominal:      General: Bowel sounds are normal. There is no distension.      Palpations: Abdomen is soft. There is no mass.      Tenderness: There is abdominal tenderness (RUQ). There is no right CVA tenderness, left CVA tenderness, guarding or rebound.      Hernia: No hernia is present.   Skin:     General: Skin is warm.   Neurological:      Mental Status: She is alert and oriented to person, place, and time.   Psychiatric:         Mood and Affect: Mood normal.        Result Review :     UA    Urinalysis 8/4/21 9/17/21   Ketones, UA Trace (A) 150 mg/dL (A)   Leukocytes, UA Trace (A) Negative   (A) Abnormal value                      Assessment and Plan    Diagnoses and all orders for this visit:    1. Fever, unspecified fever cause (Primary)  -     COVID-19,[facility] ROUTINE, NP/OP SWAB IN TRANSPORT MEDIA OR ESWAB 72 HR TAT - Swab, Nasopharynx; Future  -     POC Urinalysis Dipstick, Automated  -     COVID-19,[facility] ROUTINE, NP/OP SWAB IN TRANSPORT MEDIA OR ESWAB 72 HR TAT - Swab, Nasopharynx  -     Urine Culture - Urine, Urine, Clean Catch    2. Exposure to COVID-19 virus    3. Non-intractable vomiting with nausea, unspecified vomiting type    Other orders  -     ondansetron (Zofran) 4 MG tablet; Take 1 tablet by mouth Every 8 (Eight) Hours As Needed for Nausea or Vomiting.  Dispense: 30 tablet; Refill: 0        Follow Up   No follow-ups on file.  Patient was given instructions and counseling regarding her condition or for health maintenance advice. Please see specific information pulled into the AVS if appropriate.     Exposure to covid, fever/chills: covid test today  History of UTI: monitored urine, negative for leukocytes or bacteria, no CVA tenderness, does have generalized RUQ abdominal pain, ketones positive, likely due to decreased oral intake.   Recommend increasing fluids with electrolyte replacement. Zofran  as needed for nausea, quarantine until negative covid test, we will await urine culture to ensure no underlying UTI    Patient advised for worsening symptoms to proceed to ER or UC, advised if she does not get fluids in, she will likely need IV fluids. Verbalized understanding.

## 2021-09-17 NOTE — PATIENT INSTRUCTIONS
Increase fluids, rest, tylenol or ibuprofen as needed. For worsening symptoms, follow up in urgent care. Otherwise, wait for urine and covid results.    Dehydration, Adult  Dehydration is a condition in which there is not enough water or other fluids in the body. This happens when a person loses more fluids than he or she takes in. Important organs, such as the kidneys, brain, and heart, cannot function without a proper amount of fluids. Any loss of fluids from the body can lead to dehydration.  Dehydration can be mild, moderate, or severe. It should be treated right away to prevent it from becoming severe.  What are the causes?  Dehydration may be caused by:  · Conditions that cause loss of water or other fluids, such as diarrhea, vomiting, or sweating or urinating a lot.  · Not drinking enough fluids, especially when you are ill or doing activities that require a lot of energy.  · Other illnesses and conditions, such as fever or infection.  · Certain medicines, such as medicines that remove excess fluid from the body (diuretics).  · Lack of safe drinking water.  · Not being able to get enough water and food.  What increases the risk?  The following factors may make you more likely to develop this condition:  · Having a long-term (chronic) illness that has not been treated properly, such as diabetes, heart disease, or kidney disease.  · Being 65 years of age or older.  · Having a disability.  · Living in a place that is high in altitude, where thinner, drier air causes more fluid loss.  · Doing exercises that put stress on your body for a long time (endurance sports).  What are the signs or symptoms?  Symptoms of dehydration depend on how severe it is.  Mild or moderate dehydration  · Thirst.  · Dry lips or dry mouth.  · Dizziness or light-headedness, especially when standing up from a seated position.  · Muscle cramps.  · Dark urine. Urine may be the color of tea.  · Less urine or tears produced than  usual.  · Headache.  Severe dehydration  · Changes in skin. Your skin may be cold and clammy, blotchy, or pale. Your skin also may not return to normal after being lightly pinched and released.  · Little or no tears, urine, or sweat.  · Changes in vital signs, such as rapid breathing and low blood pressure. Your pulse may be weak or may be faster than 100 beats a minute when you are sitting still.  · Other changes, such as:  ? Feeling very thirsty.  ? Sunken eyes.  ? Cold hands and feet.  ? Confusion.  ? Being very tired (lethargic) or having trouble waking from sleep.  ? Short-term weight loss.  ? Loss of consciousness.  How is this diagnosed?  This condition is diagnosed based on your symptoms and a physical exam. You may have blood and urine tests to help confirm the diagnosis.  How is this treated?  Treatment for this condition depends on how severe it is. Treatment should be started right away. Do not wait until dehydration becomes severe. Severe dehydration is an emergency and needs to be treated in a hospital.  · Mild or moderate dehydration can be treated at home. You may be asked to:  ? Drink more fluids.  ? Drink an oral rehydration solution (ORS). This drink helps restore proper amounts of fluids and salts and minerals in the blood (electrolytes).  · Severe dehydration can be treated:  ? With IV fluids.  ? By correcting abnormal levels of electrolytes. This is often done by giving electrolytes through a tube that is passed through your nose and into your stomach (nasogastric tube, or NG tube).  ? By treating the underlying cause of dehydration.  Follow these instructions at home:  Oral rehydration solution  If told by your health care provider, drink an ORS:  · Make an ORS by following instructions on the package.  · Start by drinking small amounts, about ½ cup (120 mL) every 5-10 minutes.  · Slowly increase how much you drink until you have taken the amount recommended by your health care  provider.  Eating and drinking              · Drink enough clear fluid to keep your urine pale yellow. If you were told to drink an ORS, finish the ORS first and then start slowly drinking other clear fluids. Drink fluids such as:  ? Water. Do not drink only water. Doing that can lead to hyponatremia, which is having too little salt (sodium) in the body.  ? Water from ice chips you suck on.  ? Fruit juice that you have added water to (diluted fruit juice).  ? Low-calorie sports drinks.  · Eat foods that contain a healthy balance of electrolytes, such as bananas, oranges, potatoes, tomatoes, and spinach.  · Do not drink alcohol.  · Avoid the following:  ? Drinks that contain a lot of sugar. These include high-calorie sports drinks, fruit juice that is not diluted, and soda.  ? Caffeine.  ? Foods that are greasy or contain a lot of fat or sugar.  General instructions  · Take over-the-counter and prescription medicines only as told by your health care provider.  · Do not take sodium tablets. Doing that can lead to having too much sodium in the body (hypernatremia).  · Return to your normal activities as told by your health care provider. Ask your health care provider what activities are safe for you.  · Keep all follow-up visits as told by your health care provider. This is important.  Contact a health care provider if:  · You have muscle cramps, pain, or discomfort, such as:  ? Pain in your abdomen and the pain gets worse or stays in one area (localizes).  ? Stiff neck.  · You have a rash.  · You are more irritable than usual.  · You are sleepier or have a harder time waking than usual.  · You feel weak or dizzy.  · You feel very thirsty.  Get help right away if you have:  · Any symptoms of severe dehydration.  · Symptoms of vomiting, such as:  ? You cannot eat or drink without vomiting.  ? Vomiting gets worse or does not go away.  ? Vomit includes blood or green matter (bile).  · Symptoms that get worse with  treatment.  · A fever.  · A severe headache.  · Problems with urination or bowel movements, such as:  ? Diarrhea that gets worse or does not go away.  ? Blood in your stool (feces). This may cause stool to look black and tarry.  ? Not urinating, or urinating only a small amount of very dark urine, within 6-8 hours.  · Trouble breathing.  These symptoms may represent a serious problem that is an emergency. Do not wait to see if the symptoms will go away. Get medical help right away. Call your local emergency services (911 in the [place] Do not drive yourself to the hospital.  Summary  · Dehydration is a condition in which there is not enough water or other fluids in the body. This happens when a person loses more fluids than he or she takes in.  · Treatment for this condition depends on how severe it is. Treatment should be started right away. Do not wait until dehydration becomes severe.  · Drink enough clear fluid to keep your urine pale yellow. If you were told to drink an oral rehydration solution (ORS), finish the ORS first and then start slowly drinking other clear fluids.  · Take over-the-counter and prescription medicines only as told by your health care provider.  · Get help right away if you have any symptoms of severe dehydration.  This information is not intended to replace advice given to you by your health care provider. Make sure you discuss any questions you have with your health care provider.  Document Revised: 07/30/2020 Document Reviewed: 07/30/2020  [employer] Patient Education © 2021 [employer]

## 2021-09-19 LAB
BACTERIA UR CULT: NORMAL
BACTERIA UR CULT: NORMAL
LABCORP SARS-COV-2, NAA 2 DAY TAT: NORMAL
SARS-COV-2 RNA RESP QL NAA+PROBE: NOT DETECTED

## 2021-09-20 ENCOUNTER — TELEPHONE (OUTPATIENT)
Dept: FAMILY MEDICINE CLINIC | Facility: CLINIC | Age: 51
End: 2021-09-20

## 2021-09-20 ENCOUNTER — TELEMEDICINE (OUTPATIENT)
Dept: FAMILY MEDICINE CLINIC | Facility: TELEHEALTH | Age: 51
End: 2021-09-20

## 2021-09-20 DIAGNOSIS — H66.002 ACUTE SUPPURATIVE OTITIS MEDIA OF LEFT EAR WITHOUT SPONTANEOUS RUPTURE OF TYMPANIC MEMBRANE, RECURRENCE NOT SPECIFIED: Primary | ICD-10-CM

## 2021-09-20 PROCEDURE — 99213 OFFICE O/P EST LOW 20 MIN: CPT | Performed by: NURSE PRACTITIONER

## 2021-09-20 RX ORDER — HYDROCODONE BITARTRATE AND ACETAMINOPHEN 7.5; 325 MG/1; MG/1
TABLET ORAL
COMMUNITY
Start: 2021-09-13 | End: 2021-12-03

## 2021-09-20 RX ORDER — BUPRENORPHINE HYDROCHLORIDE AND NALOXONE HYDROCHLORIDE DIHYDRATE 8; 2 MG/1; MG/1
TABLET SUBLINGUAL
COMMUNITY
Start: 2021-06-15 | End: 2021-12-03

## 2021-09-20 RX ORDER — IBUPROFEN 800 MG/1
TABLET ORAL
COMMUNITY
Start: 2021-08-31 | End: 2022-11-10

## 2021-09-20 RX ORDER — CEFDINIR 300 MG/1
300 CAPSULE ORAL 2 TIMES DAILY
Qty: 20 CAPSULE | Refills: 0 | Status: SHIPPED | OUTPATIENT
Start: 2021-09-20 | End: 2021-09-30

## 2021-09-20 RX ORDER — CETIRIZINE HYDROCHLORIDE 10 MG/1
TABLET ORAL
COMMUNITY
Start: 2021-09-19 | End: 2021-12-03

## 2021-09-20 RX ORDER — FLUTICASONE PROPIONATE 50 MCG
SPRAY, SUSPENSION (ML) NASAL
COMMUNITY
Start: 2021-09-19 | End: 2021-12-03

## 2021-09-20 NOTE — PROGRESS NOTES
CHIEF COMPLAINT  Chief Complaint   Patient presents with   • Earache         HPI  Vanesa Escudero is a 51 y.o. female  presents with complaint of left ear pain.  Was seen at  given zyrtec and flonase, but today ear is worse.  Nasal congestion, dizziness, mild cough.     Has been tested for COVID-19 which was negative.     Review of Systems   HENT: Positive for congestion and ear pain.    Neurological: Positive for dizziness.   All other systems reviewed and are negative.      Past Medical History:   Diagnosis Date   • ADHD (attention deficit hyperactivity disorder)    • Anxiety    • Hypertension    • Kidney stone    • Right foot pain        Family History   Problem Relation Age of Onset   • Heart disease Mother    • Hypertension Mother    • Throat cancer Mother    • Hypertension Father    • Lung cancer Father    • Malig Hyperthermia Neg Hx        Social History     Socioeconomic History   • Marital status:      Spouse name: Not on file   • Number of children: Not on file   • Years of education: Not on file   • Highest education level: Not on file   Tobacco Use   • Smoking status: Current Every Day Smoker     Packs/day: 1.00     Years: 20.00     Pack years: 20.00     Types: Cigarettes   • Smokeless tobacco: Never Used   Vaping Use   • Vaping Use: Some days   • Substances: Nicotine (occasional)   • Devices: Disposable   Substance and Sexual Activity   • Alcohol use: Never   • Drug use: Never   • Sexual activity: Defer     Partners: Male         There were no vitals taken for this visit.    PHYSICAL EXAM  Physical Exam   Constitutional: She is oriented to person, place, and time. She appears well-developed and well-nourished. She does not have a sickly appearance. She does not appear ill.   HENT:   Head: Normocephalic and atraumatic.   Neurological: She is alert and oriented to person, place, and time.         Diagnoses and all orders for this visit:    1. Acute suppurative otitis media of left ear without  spontaneous rupture of tympanic membrane, recurrence not specified (Primary)  -     cefdinir (OMNICEF) 300 MG capsule; Take 1 capsule by mouth 2 (Two) Times a Day for 10 days.  Dispense: 20 capsule; Refill: 0    --complete Cefdinir as prescribed  --continue zyrtec and flonase  --if no improvement in 5-7 days, will need follow-up for further evaluation      FOLLOW-UP  As discussed during visit with PCP/[facility] if no improvement or Urgent Care/Emergency Department if worsening of symptoms    Patient verbalizes understanding of medication dosage, comfort measures, instructions for treatment and follow-up.    Merary Vargas, MARIO  09/20/2021  14:27 EDT    This visit was performed via Telehealth.  This patient has been instructed to follow-up with their primary care provider if their symptoms worsen or the treatment provided does not resolve their illness.

## 2021-09-20 NOTE — TELEPHONE ENCOUNTER
Caller: JEFFREY DONALDSON    Relationship: Emergency Contact    Best call back number: [phone]    What medication are you requesting: ANTIBIOTIC    What are your current symptoms: FLUID ON LEFT EAR AND EAR PAIN    PATIENT WAS SEEN AT URGENT CARE AT [employer] [facility] ON 09/19, AND APRN ADVISED THAT SHE HAS FLUID ON HER LEFT EAR. PATIENT WAS PRESCRIBED ZYRTEC AND FLONASE, BUT NOW THE PATIENT IS HAVING LEFT EAR PAIN AND REQUESTING SOMETHING TO BE SENT TO HER PHARMACY FOR EAR INFECTION.      If a prescription is needed, what is your preferred pharmacy and phone number: [id number] [address] AT SEC OF [facility] & [facility] - [phone] [facility] [phone] FX

## 2021-09-28 ENCOUNTER — TREATMENT (OUTPATIENT)
Dept: PHYSICAL THERAPY | Facility: CLINIC | Age: 51
End: 2021-09-28

## 2021-09-28 DIAGNOSIS — R26.9 GAIT DISTURBANCE: ICD-10-CM

## 2021-09-28 DIAGNOSIS — M25.559 PAIN, HIP: Primary | ICD-10-CM

## 2021-09-28 DIAGNOSIS — M25.652 HIP STIFFNESS, LEFT: ICD-10-CM

## 2021-09-28 PROCEDURE — 97530 THERAPEUTIC ACTIVITIES: CPT | Performed by: PHYSICAL THERAPIST

## 2021-09-28 PROCEDURE — 97161 PT EVAL LOW COMPLEX 20 MIN: CPT | Performed by: PHYSICAL THERAPIST

## 2021-09-28 NOTE — PROGRESS NOTES
Physical Therapy Initial Evaluation and Plan of Care      Patient: Vanesa Escudero   : 1970  Diagnosis/ICD-10 Code:  Pain, hip [M25.559]  Referring practitioner: Self Referring  Date of Initial Visit: 2021  Today's Date: 2021  Patient seen for 1 sessions           Subjective Evaluation    History of Present Illness  Date of onset: 2020  Mechanism of injury: Patient arrived 15 minutes late   Pain for about 1 year  Last night  Worse after taking care of grand child.  It was so bad I wasn't going to come   PAIN is in the front of the hip and goes to the groin area   Put HEAT on hip all night and today is much worse  CAN NOT but weight on LEFT leg   Hip is locking up and that is new  SHOULDER not as bad   SLEEPING can only sleep on RIGHT with pillow between   Xray shows  Moderate OA LEFT hip   Dr welsh   Moderate   Steroid shot AUG 13 no help   Pain feels deeper  PAIN so bad that goes into groin   Not taking any NSAIDS   DENIES numbness or thingling in feet or toes   No back pain   BACK SURGERY 15 YEARS AGO  FUSION     Subjective comment: L hip   R eva   Patient Occupation: [employer] not working right now  Quality of life: good    Pain  Current pain ratin  At best pain ratin  At worst pain ratin  Quality: dull ache  Relieving factors: heat, medications and rest  Aggravating factors: stairs, ambulation and repetitive movement    Social Support  Lives in: multiple-level home  Lives with: spouse    Diagnostic Tests  X-ray: abnormal (mod OA )    Treatments  Previous treatment: physical therapy  Current treatment: injection treatment  Patient Goals  Patient goals for therapy: decreased pain, increased strength and return to sport/leisure activities  Patient goal: care for grand son ;  walk without pain            Objective          Static Posture     Comments  POSTURE  decreased weight beraing on LEFT ; noted  LEFT knee and hip flexion      ROM  Able to get supine but not able to  get leg out of hook lying position secondary to pain      Attempted PROM but too guarded  MMT grossly 2+3-/5 as not able to go thru full ROM against gravity secondary to pain   TRANSFERS  INDEPENDENT but compensation all   SINGLE LEG STANCE unable to fully weight bear in LEFT   SLR   Unable to perform   EDWIN  Unable to get in test position   GAIT  Unable to fully weight bear on LEFT with excessive hip knee and trunk flexion Flexion/antalgic pattern             Functional Outcome Score: LEFS  0/80= 0% suggesting 100% dysfunction                                                    SPADI  130/130=  100%     Transfer EDUCATION for sit to stand   EDUCATION for symptom management with decompression strategy and ICE not heat    Walk or be up intermittently for short distances and avoid excessive sitting   Discussed with patient and  that if not able to bear weight then to call MD or go to urgent care/ER of choice    Assessment & Plan     Assessment  Impairments: abnormal gait, abnormal or restricted ROM, activity intolerance, impaired physical strength, lacks appropriate home exercise program and pain with function  Assessment details: Vanesa Escudero is a 51 y.o. year-old female referred to physical therapy for LEFT hip pain . She presents with a unstable clinical presentation.  She has comorbidities recent exacerbation and now unable to weight bear thru LEFT lower extremity  And NO  personal factors  that may affect her progress in the plan of care.  Signs and symptoms are consistent with physical therapy diagnosis of LEFT hip pain/ OA .   Prognosis: good  Functional Limitations: walking, uncomfortable because of pain, standing and unable to perform repetitive tasks  Goals  Plan Goals: STG: to be met by 6 weeks  1- Patient will report pain < 3 /10 with light household activities  2-Patient will increase PROM  to  AFL or LEFT = RIGHT without compensation or exacerbation   3-Patient will be independent with HEP  without compensation or exacerbation   4- patient will be able to stand upright with full weight bearing on LEFT   LTG: to be met by 12 weeks  1-Pt will report pain < 3 /10 with recreational activities   2-Pt will increase AROM   To LEFT = RIGHT   without compensation or exacerbation   3-Patient will increase strength from 2+/5  to 4 /5   4-Pt will be independent with comprehsive HEP   5- patient will walk community distances without compensation     Plan  Therapy options: will be seen for skilled physical therapy services  Planned modality interventions: ultrasound  Planned therapy interventions: transfer training, therapeutic activities, stretching, strengthening, postural training, neuromuscular re-education, home exercise program, gait training, body mechanics training and manual therapy  Other planned therapy interventions: Aquatic therapy  Frequency: 2x week  Duration in weeks: 12  Treatment plan discussed with: patient (Diagnosis and plan of care)  Plan details: DURATION in visits 36        Timed:  Manual Therapy:    0     mins  22054;  Therapeutic Exercise:    0     mins  70268;     Neuromuscular Anuj:    0    mins  22121;    Therapeutic Activity:     10     mins  37244;     Gait Trainin     mins  90408;     Ultrasound:     0     mins  59281;    Electrical Stimulation:    0     mins  32390 ( );  Iontophoresis    0     mins 50276  Dry Needling   0     mins      Untimed:  Electrical Stimulation:    0     mins  94653 (MC );  Mechanical Traction:    0     mins  32753;     Timed Treatment:   10   mins   Total Treatment:     30   mins    PT SIGNATURE: Laura Najera, PT   DATE TREATMENT INITIATED: 2021    Initial Certification  Certification Period: 2021  I certify that the therapy services are furnished while this patient is under my care.  The services outlined above are required by this patient, and will be reviewed every 90 days.     PHYSICIAN: Referring, Self      DATE:      Please sign and return via fax to [phone] Thank you, [facility].

## 2021-10-05 ENCOUNTER — OFFICE VISIT (OUTPATIENT)
Dept: ORTHOPEDIC SURGERY | Facility: CLINIC | Age: 51
End: 2021-10-05

## 2021-10-05 VITALS — BODY MASS INDEX: 27.66 KG/M2 | TEMPERATURE: 97.7 F | HEIGHT: 64 IN | WEIGHT: 162 LBS

## 2021-10-05 DIAGNOSIS — R52 PAIN: Primary | ICD-10-CM

## 2021-10-05 PROCEDURE — 73501 X-RAY EXAM HIP UNI 1 VIEW: CPT | Performed by: ORTHOPAEDIC SURGERY

## 2021-10-05 PROCEDURE — 99213 OFFICE O/P EST LOW 20 MIN: CPT | Performed by: ORTHOPAEDIC SURGERY

## 2021-10-05 NOTE — PROGRESS NOTES
Patient: Vanesa Escudero  YOB: 1970 51 y.o. female  Medical Record Number: [id number]    Chief Complaints:   Chief Complaint   Patient presents with   • Left Hip - Pain, Initial Evaluation       History of Present Illness:Vanesa Escudero is a 51 y.o. female who presents with  C/o severe left hip pain which is progressed over the last few months.  Is gotten to the point where it is limiting her activities.  She is unable to work as a  due to the severe hip pain.  It is a stabbing aching pain in the groin.  She is currently taking hydrocodone for her pain management.  She had been taking some medication that was unclear what she was taking and for a short period of time had to take Suboxone because of this.    Allergies:   Allergies   Allergen Reactions   • Bactrim [Sulfamethoxazole-Trimethoprim] Hives   • Penicillins Hives       Medications:   Current Outpatient Medications   Medication Sig Dispense Refill   • amLODIPine (NORVASC) 10 MG tablet Take 1 tablet by mouth Daily. 90 tablet 1   • amphetamine-dextroamphetamine (ADDERALL) 20 MG tablet      • cetirizine (zyrTEC) 10 MG tablet      • cloNIDine (CATAPRES) 0.1 MG tablet      • cloNIDine (CATAPRES) 0.3 MG tablet Take 0.3 mg by mouth 3 (Three) Times a Day.     • fluticasone (FLONASE) 50 MCG/ACT nasal spray      • hydroCHLOROthiazide (MICROZIDE) 12.5 MG capsule TAKE (1) CAPSULE DAILY. 30 capsule 3   • HYDROcodone-acetaminophen (NORCO) 7.5-325 MG per tablet      • ibuprofen (ADVIL,MOTRIN) 800 MG tablet      • LORazepam (ATIVAN) 0.5 MG tablet Take 0.5 mg by mouth At Night As Needed.     • losartan (COZAAR) 100 MG tablet Take 1 tablet by mouth Daily. 30 tablet 3   • metoprolol succinate XL (Toprol XL) 100 MG 24 hr tablet Take 1 tablet by mouth Daily. For hypertension (Patient taking differently: Take 100 mg by mouth Daily.) 30 tablet 2   • ondansetron (Zofran) 4 MG tablet Take 1 tablet by mouth Every 8 (Eight) Hours As Needed for Nausea or  "Vomiting. 30 tablet 0   • traZODone (DESYREL) 50 MG tablet      • amphetamine-dextroamphetamine (Adderall) 10 MG tablet Adderall 10 mg oral tablet take 1 tablet (10 mg) by oral route once daily before breakfast for 30 days   Active     • buprenorphine-naloxone (SUBOXONE) 8-2 MG per SL tablet TAKE 1 AND 1/2 TABLETS UNDER TONGUE DAILY     • busPIRone (BUSPAR) 5 MG tablet        No current facility-administered medications for this visit.         The following portions of the patient's history were reviewed and updated as appropriate: allergies, current medications, past family history, past medical history, past social history, past surgical history and problem list.    Review of Systems:   A 14 point review of systems was performed. All systems negative except pertinent positives/negative listed in HPI above    Physical Exam:   Vitals:    10/05/21 1314   Temp: 97.7 °F (36.5 °C)   Weight: 73.5 kg (162 lb)   Height: 162.6 cm (64\")       General: A and O x 3, ASA, NAD    SCLERA:    Normal    DENTITION:   Normal   Hip:  left    LEG ALIGNMENT:     Neutral        LEG LENGTH DISCREPANCY   :    none    GAIT:     Antalgic    SKIN:     No abnormality    RANGE OF MOTION:     Limited by joint irritability    STRENGTH:     Limited by joint irratibility    DISTAL PULSES:    Paplable    DISTAL SENSATION :   Intact    LYMPHATICS:     No   lymphadenopathy    OTHER:          +   Stinchfeld test      -    log roll      -   Tenderness to palpation trochanteric bursa       Radiology:  Xrays 2views left hip (AP bilateral hips, and lateral of the hip) ordered and reviewed for evaluation of hip pain  demonstrating  advanced, end-satge osteoarthritis with bone on bone articluation, periarticular osteophytes, and subchondral cysts.     Assessment/Plan: Severe end-stage left hip osteoarthritis we had a long discussion about this problem I think the only solution is a total hip replacement.  She currently is on narcotics for pain management for " this.  I explained to her that I do not think that narcotic medication for treatment of arthritis is appropriate and would only need to greater tolerance and dependency which in itself would be another problem.  I certainly think this is at the point where her hip replacement is necessary.  She would have to decide if her pain is at that point but if we decide to proceed with a left anterior proximal hip replacement I would need her to be weaned off the narcotics prior to surgery and would plan on Tylenol and tramadol for postop management.      Robbie Mercado MD  10/5/2021

## 2021-10-06 ENCOUNTER — TELEPHONE (OUTPATIENT)
Dept: ORTHOPEDIC SURGERY | Facility: CLINIC | Age: 51
End: 2021-10-06

## 2021-10-06 NOTE — TELEPHONE ENCOUNTER
Caller: JEFFREY DONALDSON    Relationship: SIGNIFICANT OTHER    Best call back number:     What form or medical record are you requesting: IMAGING TAKEN ON 10/5    How would you like to receive the form or medical records (pick-up, mail, fax):PICKUP DISC  If pick-up, provide patient with address and location details    Timeframe paperwork needed: ASAP    Additional notes: PATIENT WOULD LIKE XRAYS ON DISC PLEASE CALL THEM WHEN READY TO .  HAS APPT TOMORROW AT [facility]

## 2021-10-07 ENCOUNTER — TREATMENT (OUTPATIENT)
Dept: PHYSICAL THERAPY | Facility: CLINIC | Age: 51
End: 2021-10-07

## 2021-10-07 DIAGNOSIS — M25.652 HIP STIFFNESS, LEFT: ICD-10-CM

## 2021-10-07 DIAGNOSIS — R26.9 GAIT DISTURBANCE: ICD-10-CM

## 2021-10-07 DIAGNOSIS — M25.559 PAIN, HIP: Primary | ICD-10-CM

## 2021-10-07 PROCEDURE — 97113 AQUATIC THERAPY/EXERCISES: CPT | Performed by: PHYSICAL THERAPIST

## 2021-10-07 NOTE — PROGRESS NOTES
"Physical Therapy Daily Progress Note    Patient: Vanesa Escudero   : 1970  Diagnosis/ICD-10 Code:  Pain, hip [M25.559]  Referring practitioner: Lucas Martini MD  Date of Initial Visit: Type: THERAPY  Noted: 2021  Today's Date: 10/7/2021  Patient seen for 2 sessions             Subjective   Patient reports she was told she needs a hip replacement and has bone on bone L hip OA.      Objective   See Exercise, Manual, and Modality Logs for complete treatment.     AQUATIC EXERCISES:  Water walk                  Forward / backward / sideways x 2-3 laps ea  Calf stretch                  20-30 sec x 2 off edge of step  HS stretch                   20-30 sec with LN and hip sweeps  Quad/hip flexor stretch         20-30 sec with LN  March walking             2 laps  Mini Squats                 15x *defer  Hip Abd                       15x, B  Hip Flex                       15x, B  Hip Ext                         15x, B  Leg Press                    20x vs large blue aqua ring, B  Hip circles cw/ccw       10/10  Standing LAQ  w/ DF   15x, noodle under flexed knee  Step ups                      8\" red box x 20 B  Ab Pushdowns            LN x 15  Bicycle                         2 min, suspended LN/Rail        Assessment/Plan  Patient is here for her first treatment session with her  who is coming for knee pain. Provided demonstration and verbal cues for all exercises performed. Emphasized importance of maintaining upright posture with hip exercises by engaging core. Will assess response to first session and progress as appropriate.          Timed:  Aquatic Therapy    23     mins 85191; SPLIT CHARGES    Snow Delgadillo, PT  Physical Therapist  "

## 2021-12-03 ENCOUNTER — OFFICE VISIT (OUTPATIENT)
Dept: FAMILY MEDICINE CLINIC | Facility: CLINIC | Age: 51
End: 2021-12-03

## 2021-12-03 VITALS
SYSTOLIC BLOOD PRESSURE: 143 MMHG | TEMPERATURE: 95.7 F | OXYGEN SATURATION: 100 % | BODY MASS INDEX: 27.98 KG/M2 | HEIGHT: 64 IN | RESPIRATION RATE: 16 BRPM | HEART RATE: 93 BPM | DIASTOLIC BLOOD PRESSURE: 120 MMHG | WEIGHT: 163.9 LBS

## 2021-12-03 DIAGNOSIS — L29.2 VULVAR ITCHING: ICD-10-CM

## 2021-12-03 DIAGNOSIS — Z12.31 ENCOUNTER FOR SCREENING MAMMOGRAM FOR MALIGNANT NEOPLASM OF BREAST: ICD-10-CM

## 2021-12-03 DIAGNOSIS — Z00.00 ANNUAL PHYSICAL EXAM: Primary | ICD-10-CM

## 2021-12-03 DIAGNOSIS — Z11.3 ROUTINE SCREENING FOR STI (SEXUALLY TRANSMITTED INFECTION): ICD-10-CM

## 2021-12-03 PROCEDURE — 99396 PREV VISIT EST AGE 40-64: CPT | Performed by: NURSE PRACTITIONER

## 2021-12-03 RX ORDER — FLUCONAZOLE 150 MG/1
150 TABLET ORAL ONCE
Qty: 2 TABLET | Refills: 0 | Status: SHIPPED | OUTPATIENT
Start: 2021-12-03 | End: 2021-12-04

## 2021-12-03 RX ORDER — HYDROCHLOROTHIAZIDE 25 MG/1
25 TABLET ORAL DAILY
Qty: 90 TABLET | Refills: 1 | Status: SHIPPED | OUTPATIENT
Start: 2021-12-03 | End: 2021-12-15

## 2021-12-03 NOTE — PROGRESS NOTES
"Chief Complaint  Annual Exam    Subjective          Vanesa Escudero presents to [facility]  Vanesa presents for an annual physical.    Scheduled for hip surgery in January  Evaluated by back doctor this morning and recommended    Her main concern is external genitalia itching.  Thought she had a vaginal yeast. Has vaginal dryness, tried probiotic, vagisil cream and no improvement. No odor or color to vaginal discharge, mild  Thought it may have been razor burn  Worse initially at night, now constant    Post menopausal, denies new products,   Uses dial or body wash          Objective   Vital Signs:   BP (!) 143/120   Pulse 93   Temp 95.7 °F (35.4 °C) (Infrared)   Resp 16   Ht 162.6 cm (64\")   Wt 74.3 kg (163 lb 14.4 oz)   SpO2 100%   BMI 28.13 kg/m²     Physical Exam  Vitals reviewed.   Constitutional:       Appearance: Normal appearance. She is normal weight.   HENT:      Right Ear: Tympanic membrane and ear canal normal.      Left Ear: Tympanic membrane and ear canal normal.      Mouth/Throat:      Mouth: Mucous membranes are moist.      Pharynx: Oropharynx is clear.   Eyes:      Conjunctiva/sclera: Conjunctivae normal.      Pupils: Pupils are equal, round, and reactive to light.   Cardiovascular:      Rate and Rhythm: Normal rate and regular rhythm.      Heart sounds: No murmur heard.  No friction rub. No gallop.    Pulmonary:      Effort: Pulmonary effort is normal. No respiratory distress.      Breath sounds: Normal breath sounds. No wheezing, rhonchi or rales.   Abdominal:      General: Bowel sounds are normal. There is no distension.      Palpations: Abdomen is soft. There is no mass.      Tenderness: There is no abdominal tenderness. There is no right CVA tenderness, left CVA tenderness, guarding or rebound.      Hernia: No hernia is present.   Genitourinary:     Comments: Normal external genitalia, no lesions, masses, no discharge  No erythema  Musculoskeletal:      " Cervical back: No tenderness.   Skin:     General: Skin is warm and dry.   Neurological:      Mental Status: She is alert and oriented to person, place, and time.   Psychiatric:         Mood and Affect: Mood normal.        Result Review :                 Assessment and Plan    Diagnoses and all orders for this visit:    1. Annual physical exam (Primary)  -     Mammo screening digital tomosynthesis bilateral w CAD; Future  -     CBC & Differential  -     Comprehensive metabolic panel  -     Hemoglobin A1c  -     POC Urinalysis Dipstick, Automated  -     Ambulatory Referral to Gynecology    2. Encounter for screening mammogram for malignant neoplasm of breast  -     Mammo screening digital tomosynthesis bilateral w CAD; Future    3. Routine screening for STI (sexually transmitted infection)  -     Chlamydia trachomatis, Neisseria gonorrhoeae, Trichomonas vaginalis, PCR - Swab, Vagina  -     HIV-1 / O / 2 Ag / Antibody 4th Generation  -     RPR    4. Vulvar itching  -     Ambulatory Referral to Gynecology  -     Fungus Culture - Brushing, Vulva    Other orders  -     fluconazole (Diflucan) 150 MG tablet; Take 1 tablet by mouth 1 (One) Time for 1 dose. Repeat in 3 days  Dispense: 2 tablet; Refill: 0  -     hydroCHLOROthiazide (HYDRODIURIL) 25 MG tablet; Take 1 tablet by mouth Daily.  Dispense: 90 tablet; Refill: 1        Follow Up   No follow-ups on file.  Patient was given instructions and counseling regarding her condition or for health maintenance advice. Please see specific information pulled into the AVS if appropriate.       Information/counseling provided to the patient regarding periodic ramón maintenance recommendations, including but not limited to immunizations, diet/exercise/healthy lifestyle, laboratory, and other screenings. BMI is discussed. Appropriate exercise, diet, and weight plans are discussed.    Labs today  Pap is up to date, given new vaginal symptoms and normal external exam, referral to  gyn  Possible allergy, although no edema or erythema noted  Fungal culture sent    HTN: uncontrolled, reports taking amlodipine, clonidine, losartan and metoprolol daily, has not had her afternoon dose of clonidine which is likely the result of spike in bp, she is also taking adderall which will increase BP    Vulvar itching, exam is normal, will proceed with fungal swab, routine STI screening and UA, referral to gyn  bp is elevated in office, repeat is still elevated, recommend increasing hctz to 25 mg daily, advised will need to repeat labs to monitor potassium/kidney function

## 2021-12-04 ENCOUNTER — TELEMEDICINE (OUTPATIENT)
Dept: FAMILY MEDICINE CLINIC | Facility: TELEHEALTH | Age: 51
End: 2021-12-04

## 2021-12-04 DIAGNOSIS — N89.8 VAGINAL ITCHING: ICD-10-CM

## 2021-12-04 DIAGNOSIS — B37.31 VAGINAL CANDIDIASIS: Primary | ICD-10-CM

## 2021-12-04 LAB
ALBUMIN SERPL-MCNC: 4.2 G/DL (ref 3.8–4.8)
ALBUMIN/GLOB SERPL: 1.4 {RATIO} (ref 1.2–2.2)
ALP SERPL-CCNC: 128 IU/L (ref 44–121)
ALT SERPL-CCNC: 45 IU/L (ref 0–32)
AST SERPL-CCNC: 40 IU/L (ref 0–40)
BASOPHILS # BLD AUTO: 0.1 X10E3/UL (ref 0–0.2)
BASOPHILS NFR BLD AUTO: 1 %
BILIRUB SERPL-MCNC: <0.2 MG/DL (ref 0–1.2)
BUN SERPL-MCNC: 16 MG/DL (ref 6–24)
BUN/CREAT SERPL: 13 (ref 9–23)
CALCIUM SERPL-MCNC: 9.4 MG/DL (ref 8.7–10.2)
CHLORIDE SERPL-SCNC: 102 MMOL/L (ref 96–106)
CO2 SERPL-SCNC: 25 MMOL/L (ref 20–29)
CREAT SERPL-MCNC: 1.23 MG/DL (ref 0.57–1)
EOSINOPHIL # BLD AUTO: 0.7 X10E3/UL (ref 0–0.4)
EOSINOPHIL NFR BLD AUTO: 5 %
ERYTHROCYTE [DISTWIDTH] IN BLOOD BY AUTOMATED COUNT: 13.2 % (ref 11.7–15.4)
GLOBULIN SER CALC-MCNC: 2.9 G/DL (ref 1.5–4.5)
GLUCOSE SERPL-MCNC: 73 MG/DL (ref 65–99)
HBA1C MFR BLD: 5.3 % (ref 4.8–5.6)
HCT VFR BLD AUTO: 37 % (ref 34–46.6)
HGB BLD-MCNC: 12.2 G/DL (ref 11.1–15.9)
HIV 1+2 AB+HIV1 P24 AG SERPL QL IA: NON REACTIVE
IMM GRANULOCYTES # BLD AUTO: 0.1 X10E3/UL (ref 0–0.1)
IMM GRANULOCYTES NFR BLD AUTO: 1 %
LYMPHOCYTES # BLD AUTO: 2.9 X10E3/UL (ref 0.7–3.1)
LYMPHOCYTES NFR BLD AUTO: 20 %
MCH RBC QN AUTO: 29.3 PG (ref 26.6–33)
MCHC RBC AUTO-ENTMCNC: 33 G/DL (ref 31.5–35.7)
MCV RBC AUTO: 89 FL (ref 79–97)
MONOCYTES # BLD AUTO: 1.1 X10E3/UL (ref 0.1–0.9)
MONOCYTES NFR BLD AUTO: 7 %
NEUTROPHILS # BLD AUTO: 9.4 X10E3/UL (ref 1.4–7)
NEUTROPHILS NFR BLD AUTO: 66 %
PLATELET # BLD AUTO: 330 X10E3/UL (ref 150–450)
POTASSIUM SERPL-SCNC: 4.1 MMOL/L (ref 3.5–5.2)
PROT SERPL-MCNC: 7.1 G/DL (ref 6–8.5)
RBC # BLD AUTO: 4.17 X10E6/UL (ref 3.77–5.28)
RPR SER QL: NON REACTIVE
SODIUM SERPL-SCNC: 140 MMOL/L (ref 134–144)
WBC # BLD AUTO: 14.1 X10E3/UL (ref 3.4–10.8)

## 2021-12-04 PROCEDURE — 99213 OFFICE O/P EST LOW 20 MIN: CPT | Performed by: NURSE PRACTITIONER

## 2021-12-04 RX ORDER — NYSTATIN 100000 U/G
1 CREAM TOPICAL 2 TIMES DAILY
Qty: 30 G | Refills: 0 | Status: SHIPPED | OUTPATIENT
Start: 2021-12-04 | End: 2022-11-10

## 2021-12-04 RX ORDER — TRIAMCINOLONE ACETONIDE 1 MG/G
1 CREAM TOPICAL 2 TIMES DAILY
Qty: 15 G | Refills: 1 | Status: SHIPPED | OUTPATIENT
Start: 2021-12-04 | End: 2022-11-10

## 2021-12-04 NOTE — PROGRESS NOTES
You have chosen to receive care through a telehealth visit.  Do you consent to use a video/audio connection for your medical care today? Yes     CHIEF COMPLAINT  Chief Complaint   Patient presents with   • Vaginitis   • Vaginal Itching         HPI  Vanesa Escudero is a 51 y.o. female  presents with complaint of vaginal itching, irritation, genital swelling and clear discharge. She saw her pcp yesterday and was negative for everything but did have a mild elevated WBC count. She was placed on Diflucan to take one yesterday and repeat in 3 days but she reports she is worse after taking the Diflucan.     Review of Systems   Constitutional: Negative.    Genitourinary: Positive for vaginal discharge. Negative for difficulty urinating, dysuria, flank pain, frequency, genital sores, vaginal bleeding and vaginal pain (vaginal irritation and inflammation).   Musculoskeletal: Negative.    Skin: Positive for color change (vulva with mild swelling and redness).   Psychiatric/Behavioral: Negative.        Past Medical History:   Diagnosis Date   • ADHD (attention deficit hyperactivity disorder)    • Anxiety    • Hypertension    • Kidney stone    • Right foot pain        Family History   Problem Relation Age of Onset   • Heart disease Mother    • Hypertension Mother    • Throat cancer Mother    • Hypertension Father    • Lung cancer Father    • Malig Hyperthermia Neg Hx        Social History     Socioeconomic History   • Marital status:    Tobacco Use   • Smoking status: Current Every Day Smoker     Packs/day: 1.00     Years: 20.00     Pack years: 20.00     Types: Cigarettes   • Smokeless tobacco: Never Used   Vaping Use   • Vaping Use: Some days   • Substances: Nicotine (occasional)   • Devices: Disposable   Substance and Sexual Activity   • Alcohol use: Never   • Drug use: Never   • Sexual activity: Defer     Partners: Male         There were no vitals taken for this visit.    PHYSICAL EXAM  Virtual Visit Physical  Exam    Results for orders placed or performed in visit on 12/03/21   Comprehensive metabolic panel    Specimen: Blood   Result Value Ref Range    Glucose 73 65 - 99 mg/dL    BUN 16 6 - 24 mg/dL    Creatinine 1.23 (H) 0.57 - 1.00 mg/dL    eGFR Non African Am 52 (L) >59 mL/min/1.73    eGFR African Am 60 >59 mL/min/1.73    BUN/Creatinine Ratio 13 9 - 23    Sodium 140 134 - 144 mmol/L    Potassium 4.1 3.5 - 5.2 mmol/L    Chloride 102 96 - 106 mmol/L    Total CO2 25 20 - 29 mmol/L    Calcium 9.4 8.7 - 10.2 mg/dL    Total Protein 7.1 6.0 - 8.5 g/dL    Albumin 4.2 3.8 - 4.8 g/dL    Globulin 2.9 1.5 - 4.5 g/dL    A/G Ratio 1.4 1.2 - 2.2    Total Bilirubin <0.2 0.0 - 1.2 mg/dL    Alkaline Phosphatase 128 (H) 44 - 121 IU/L    AST (SGOT) 40 0 - 40 IU/L    ALT (SGPT) 45 (H) 0 - 32 IU/L   Hemoglobin A1c    Specimen: Blood   Result Value Ref Range    Hemoglobin A1C 5.3 4.8 - 5.6 %   HIV-1 / O / 2 Ag / Antibody 4th Generation    Specimen: Blood   Result Value Ref Range    HIV Screen 4th Gen w/RFX (Reference) Non Reactive Non Reactive   RPR    Specimen: Blood   Result Value Ref Range    RPR Non Reactive Non Reactive   CBC & Differential    Specimen: Blood   Result Value Ref Range    WBC 14.1 (H) 3.4 - 10.8 x10E3/uL    RBC 4.17 3.77 - 5.28 x10E6/uL    Hemoglobin 12.2 11.1 - 15.9 g/dL    Hematocrit 37.0 34.0 - 46.6 %    MCV 89 79 - 97 fL    MCH 29.3 26.6 - 33.0 pg    MCHC 33.0 31.5 - 35.7 g/dL    RDW 13.2 11.7 - 15.4 %    Platelets 330 150 - 450 x10E3/uL    Neutrophil Rel % 66 Not Estab. %    Lymphocyte Rel % 20 Not Estab. %    Monocyte Rel % 7 Not Estab. %    Eosinophil Rel % 5 Not Estab. %    Basophil Rel % 1 Not Estab. %    Neutrophils Absolute 9.4 (H) 1.4 - 7.0 x10E3/uL    Lymphocytes Absolute 2.9 0.7 - 3.1 x10E3/uL    Monocytes Absolute 1.1 (H) 0.1 - 0.9 x10E3/uL    Eosinophils Absolute 0.7 (H) 0.0 - 0.4 x10E3/uL    Basophils Absolute 0.1 0.0 - 0.2 x10E3/uL    Immature Granulocyte Rel % 1 Not Estab. %    Immature Grans Absolute  0.1 0.0 - 0.1 x10E3/uL       Diagnoses and all orders for this visit:    1. Vaginal candidiasis (Primary)  -     nystatin (MYCOSTATIN) 506324 UNIT/GM cream; Apply 1 application topically to the appropriate area as directed 2 (Two) Times a Day.  Dispense: 30 g; Refill: 0    2. Vaginal itching  -     triamcinolone (KENALOG) 0.1 % cream; Apply 1 application topically to the appropriate area as directed 2 (Two) Times a Day.  Dispense: 15 g; Refill: 1    contact PCP on Monday to discuss lab results.   Warm oatmeal bath soaks.   No douching no harsh soaps in genital area  Pending OB/GYN appointment.       FOLLOW-UP  As discussed during visit with PCP/[facility] Care if no improvement or Urgent Care/Emergency Department if worsening of symptoms    Patient verbalizes understanding of medication dosage, comfort measures, instructions for treatment and follow-up.    MARIO Velasco  12/04/2021  10:45 EST    This visit was performed via Telehealth.  This patient has been instructed to follow-up with their primary care provider if their symptoms worsen or the treatment provided does not resolve their illness.

## 2021-12-07 LAB
C TRACH RRNA SPEC QL NAA+PROBE: NEGATIVE
N GONORRHOEA RRNA SPEC QL NAA+PROBE: NEGATIVE
T VAGINALIS DNA SPEC QL NAA+PROBE: NEGATIVE

## 2021-12-10 LAB — YEAST GENITAL QL CULT: NEGATIVE

## 2021-12-15 ENCOUNTER — LAB (OUTPATIENT)
Dept: LAB | Facility: HOSPITAL | Age: 51
End: 2021-12-15

## 2021-12-15 ENCOUNTER — OFFICE VISIT (OUTPATIENT)
Dept: FAMILY MEDICINE CLINIC | Facility: CLINIC | Age: 51
End: 2021-12-15

## 2021-12-15 VITALS
TEMPERATURE: 97.3 F | HEIGHT: 64 IN | BODY MASS INDEX: 27.49 KG/M2 | SYSTOLIC BLOOD PRESSURE: 176 MMHG | WEIGHT: 161 LBS | DIASTOLIC BLOOD PRESSURE: 124 MMHG | HEART RATE: 100 BPM | RESPIRATION RATE: 16 BRPM | OXYGEN SATURATION: 99 %

## 2021-12-15 DIAGNOSIS — I10 ESSENTIAL HYPERTENSION: ICD-10-CM

## 2021-12-15 DIAGNOSIS — R21 RASH: ICD-10-CM

## 2021-12-15 DIAGNOSIS — D72.829 LEUKOCYTOSIS, UNSPECIFIED TYPE: Primary | ICD-10-CM

## 2021-12-15 PROCEDURE — 36415 COLL VENOUS BLD VENIPUNCTURE: CPT | Performed by: NURSE PRACTITIONER

## 2021-12-15 PROCEDURE — 99214 OFFICE O/P EST MOD 30 MIN: CPT | Performed by: NURSE PRACTITIONER

## 2021-12-15 PROCEDURE — 85025 COMPLETE CBC W/AUTO DIFF WBC: CPT | Performed by: NURSE PRACTITIONER

## 2021-12-15 RX ORDER — CHLORTHALIDONE 50 MG/1
50 TABLET ORAL DAILY
Qty: 30 TABLET | Refills: 0 | Status: SHIPPED | OUTPATIENT
Start: 2021-12-15 | End: 2022-11-10

## 2021-12-15 NOTE — PROGRESS NOTES
Chief Complaint  Rash (back and legs off and on. thinks its from mold)    Subjective          Vanesa Escudero presents to [facility]  Vanesa presents with intermittent rash on legs and back. She is concerned for mold. [facility] department came to evaluate and stated it was not leaking and not concerned about mold. She believes this mold is causing itching. She currently does not have a rash. She was in the office two weeks ago for vulvar itching which has since resolved. Testing was negative at that time and no rash was identified. She is also concerned with headaches as this has been ongoing. Her bp today is 176/124, she reports to taking medication daily. She is prescribed clonidine tid dosing from psychiatry. She denies missed doses. She is taking amlodipine 10 mg, metoprolol 100 mg, losartan 100 mg, hctz 25. Denies missed doses and takes daily. Denies street drug use    She is interested in blood work to show if she has mold exposure.          Rash  This is a recurrent problem. The current episode started 1 to 4 weeks ago. The problem has been resolved since onset. Location: back, bilateral lower extremities, previously vulva. She was exposed to nothing. Pertinent negatives include no anorexia, congestion, cough, diarrhea, eye pain, facial edema, fatigue, fever, joint pain, nail changes, rhinorrhea, shortness of breath, sore throat or vomiting. Past treatments include nothing. The treatment provided no relief.   Hypertension  This is a chronic problem. The current episode started more than 1 year ago. The problem has been rapidly worsening since onset. The problem is uncontrolled. Associated symptoms include anxiety. Pertinent negatives include no blurred vision, chest pain, headaches, malaise/fatigue, neck pain, orthopnea, palpitations, peripheral edema, PND, shortness of breath or sweats. There are no associated agents to hypertension. Past treatments include angiotensin  "blockers, beta blockers and calcium channel blockers. Current antihypertension treatment includes angiotensin blockers, diuretics, calcium channel blockers and beta blockers. The current treatment provides no improvement.       Objective   Vital Signs:   BP (!) 176/124   Pulse 100   Temp 97.3 °F (36.3 °C) (Infrared)   Resp 16   Ht 162.6 cm (64\")   Wt 73 kg (161 lb)   SpO2 99%   BMI 27.64 kg/m²     Physical Exam  Vitals reviewed.   Constitutional:       General: She is not in acute distress.     Appearance: She is well-developed. She is not diaphoretic.   HENT:      Head: Normocephalic and atraumatic.      Right Ear: Tympanic membrane, ear canal and external ear normal.      Left Ear: Tympanic membrane, ear canal and external ear normal.      Nose: Nose normal.      Mouth/Throat:      Pharynx: Uvula midline. No oropharyngeal exudate.   Cardiovascular:      Rate and Rhythm: Normal rate and regular rhythm.      Heart sounds: Normal heart sounds. No murmur heard.  No friction rub. No gallop.    Pulmonary:      Effort: Pulmonary effort is normal. No respiratory distress.      Breath sounds: Normal breath sounds. No wheezing or rales.   Abdominal:      General: Bowel sounds are normal. There is no distension.      Palpations: Abdomen is soft.      Tenderness: There is no abdominal tenderness.   Musculoskeletal:      Cervical back: Neck supple.   Skin:     General: Skin is warm and dry.   Neurological:      Mental Status: She is alert and oriented to person, place, and time.        Result Review :                 Assessment and Plan    Diagnoses and all orders for this visit:    1. Leukocytosis, unspecified type (Primary)  -     CBC & Differential    2. Essential hypertension  -     chlorthalidone (HYGROTEN) 50 MG tablet; Take 1 tablet by mouth Daily.  Dispense: 30 tablet; Refill: 0    3. Rash  -     Ambulatory Referral to Allergy      I spent 45 minutes caring for Vanesa on this date of service. This time includes " time spent by me in the following activities:preparing for the visit, reviewing tests, performing a medically appropriate examination and/or evaluation , counseling and educating the patient/family/caregiver, ordering medications, tests, or procedures, referring and communicating with other health care professionals  and documenting information in the medical record  Follow Up   Return in about 1 month (around 1/15/2022).  Patient was given instructions and counseling regarding her condition or for health maintenance advice. Please see specific information pulled into the AVS if appropriate.     Vanesa presents for follow itching, at her last visit, her WBC was elevated at 14  Other vaginal testing was normal  Mild elevation in liver enzymes  She now returns with worsening itching, on back and lower extremities and reports rash, none present today  She is concerned for mold exposure in her apartment. Recommend follow up with allergist for possible skin testing to see if there is an underlying trigger  Discussed anxiety can trigger itching, she will discuss with her psychiatrist as well.   Of concern is her HTN, very much uncontrolled. She is taking clonidine and may be seeing rebound hypertension, although she reports no missed doses  Will d/c hctz, add chlorthalidone and have her follow up in the office in one month to monitor and trend BP, watch potassium and creatinine levels. Her creatinine was increased at last visit, and recommended she increase fluids, especially given medication change.

## 2021-12-16 LAB
BASOPHILS # BLD AUTO: 0.08 10*3/MM3 (ref 0–0.2)
BASOPHILS NFR BLD AUTO: 0.6 % (ref 0–1.5)
DEPRECATED RDW RBC AUTO: 46.3 FL (ref 37–54)
EOSINOPHIL # BLD AUTO: 0.68 10*3/MM3 (ref 0–0.4)
EOSINOPHIL NFR BLD AUTO: 5 % (ref 0.3–6.2)
ERYTHROCYTE [DISTWIDTH] IN BLOOD BY AUTOMATED COUNT: 13.8 % (ref 12.3–15.4)
HCT VFR BLD AUTO: 38.9 % (ref 34–46.6)
HGB BLD-MCNC: 12.5 G/DL (ref 12–15.9)
IMM GRANULOCYTES # BLD AUTO: 0.06 10*3/MM3 (ref 0–0.05)
IMM GRANULOCYTES NFR BLD AUTO: 0.4 % (ref 0–0.5)
LYMPHOCYTES # BLD AUTO: 3.21 10*3/MM3 (ref 0.7–3.1)
LYMPHOCYTES NFR BLD AUTO: 23.5 % (ref 19.6–45.3)
MCH RBC QN AUTO: 29.2 PG (ref 26.6–33)
MCHC RBC AUTO-ENTMCNC: 32.1 G/DL (ref 31.5–35.7)
MCV RBC AUTO: 90.9 FL (ref 79–97)
MONOCYTES # BLD AUTO: 1.04 10*3/MM3 (ref 0.1–0.9)
MONOCYTES NFR BLD AUTO: 7.6 % (ref 5–12)
NEUTROPHILS NFR BLD AUTO: 62.9 % (ref 42.7–76)
NEUTROPHILS NFR BLD AUTO: 8.59 10*3/MM3 (ref 1.7–7)
NRBC BLD AUTO-RTO: 0 /100 WBC (ref 0–0.2)
PATHOLOGY REVIEW: YES
PLATELET # BLD AUTO: 338 10*3/MM3 (ref 140–450)
PMV BLD AUTO: 10.1 FL (ref 6–12)
RBC # BLD AUTO: 4.28 10*6/MM3 (ref 3.77–5.28)
WBC NRBC COR # BLD: 13.66 10*3/MM3 (ref 3.4–10.8)

## 2021-12-17 ENCOUNTER — TELEPHONE (OUTPATIENT)
Dept: FAMILY MEDICINE CLINIC | Facility: CLINIC | Age: 51
End: 2021-12-17

## 2021-12-17 LAB
LAB AP CASE REPORT: NORMAL
LAB AP CLINICAL INFORMATION: NORMAL
PATH REPORT.FINAL DX SPEC: NORMAL
PATH REPORT.GROSS SPEC: NORMAL

## 2021-12-17 NOTE — TELEPHONE ENCOUNTER
Patient is returning you call regarding her lab results. She would like a call back today if possible.    Please advise.

## 2021-12-21 DIAGNOSIS — D72.829 LEUKOCYTOSIS, UNSPECIFIED TYPE: Primary | ICD-10-CM

## 2022-02-04 NOTE — ANESTHESIA PROCEDURE NOTES
Airway  Urgency: elective    Date/Time: 5/4/2021 7:42 AM    General Information and Staff    Patient location during procedure: OR  Anesthesiologist: Isak Weems MD  CRNA: Jovana Joya CRNA    Indications and Patient Condition    Preoxygenated: yes  Mask difficulty assessment: 0 - not attempted    Final Airway Details  Final airway type: supraglottic airway      Successful airway: unique  Size 4    Number of attempts at approach: 1  Assessment: lips, teeth, and gum same as pre-op and atraumatic intubation    Additional Comments  Atraumatic, adeq seal, secured, MV/AV until SV             · Status post fall in living room tonight  · See plan under closed displaced intertrochanteric fracture of right femur

## 2022-02-27 ENCOUNTER — APPOINTMENT (OUTPATIENT)
Dept: CT IMAGING | Facility: HOSPITAL | Age: 52
End: 2022-02-27

## 2022-02-27 ENCOUNTER — HOSPITAL ENCOUNTER (EMERGENCY)
Facility: HOSPITAL | Age: 52
Discharge: HOME | End: 2022-02-27
Attending: EMERGENCY MEDICINE | Admitting: EMERGENCY MEDICINE

## 2022-02-27 VITALS
HEART RATE: 70 BPM | RESPIRATION RATE: 18 BRPM | SYSTOLIC BLOOD PRESSURE: 127 MMHG | BODY MASS INDEX: 28.45 KG/M2 | DIASTOLIC BLOOD PRESSURE: 91 MMHG | HEIGHT: 64 IN | TEMPERATURE: 98.8 F | OXYGEN SATURATION: 97 % | WEIGHT: 166.67 LBS

## 2022-02-27 DIAGNOSIS — L03.211 FACIAL CELLULITIS: Primary | ICD-10-CM

## 2022-02-27 LAB
ALBUMIN SERPL-MCNC: 4.1 G/DL (ref 3.5–5.2)
ALBUMIN/GLOB SERPL: 1.3 G/DL
ALP SERPL-CCNC: 138 U/L (ref 39–117)
ALT SERPL W P-5'-P-CCNC: 39 U/L (ref 1–33)
ANION GAP SERPL CALCULATED.3IONS-SCNC: 9 MMOL/L (ref 5–15)
AST SERPL-CCNC: 29 U/L (ref 1–32)
BASOPHILS # BLD AUTO: 0.05 10*3/MM3 (ref 0–0.2)
BASOPHILS NFR BLD AUTO: 0.4 % (ref 0–1.5)
BILIRUB SERPL-MCNC: 0.2 MG/DL (ref 0–1.2)
BUN SERPL-MCNC: 18 MG/DL (ref 6–20)
BUN/CREAT SERPL: 14.8 (ref 7–25)
CALCIUM SPEC-SCNC: 9.6 MG/DL (ref 8.6–10.5)
CHLORIDE SERPL-SCNC: 103 MMOL/L (ref 98–107)
CO2 SERPL-SCNC: 25 MMOL/L (ref 22–29)
CREAT SERPL-MCNC: 1.22 MG/DL (ref 0.57–1)
CRP SERPL-MCNC: 0.33 MG/DL (ref 0–0.5)
D-LACTATE SERPL-SCNC: 1 MMOL/L (ref 0.5–2)
DEPRECATED RDW RBC AUTO: 51 FL (ref 37–54)
EOSINOPHIL # BLD AUTO: 0.54 10*3/MM3 (ref 0–0.4)
EOSINOPHIL NFR BLD AUTO: 4.1 % (ref 0.3–6.2)
ERYTHROCYTE [DISTWIDTH] IN BLOOD BY AUTOMATED COUNT: 14.7 % (ref 12.3–15.4)
ERYTHROCYTE [SEDIMENTATION RATE] IN BLOOD: 20 MM/HR (ref 0–20)
GFR SERPL CREATININE-BSD FRML MDRD: 47 ML/MIN/1.73
GLOBULIN UR ELPH-MCNC: 3.2 GM/DL
GLUCOSE SERPL-MCNC: 103 MG/DL (ref 65–99)
HCT VFR BLD AUTO: 37.9 % (ref 34–46.6)
HGB BLD-MCNC: 12.4 G/DL (ref 12–15.9)
IMM GRANULOCYTES # BLD AUTO: 0.07 10*3/MM3 (ref 0–0.05)
IMM GRANULOCYTES NFR BLD AUTO: 0.5 % (ref 0–0.5)
LYMPHOCYTES # BLD AUTO: 3.11 10*3/MM3 (ref 0.7–3.1)
LYMPHOCYTES NFR BLD AUTO: 23.4 % (ref 19.6–45.3)
MCH RBC QN AUTO: 30.5 PG (ref 26.6–33)
MCHC RBC AUTO-ENTMCNC: 32.7 G/DL (ref 31.5–35.7)
MCV RBC AUTO: 93.3 FL (ref 79–97)
MONOCYTES # BLD AUTO: 1.11 10*3/MM3 (ref 0.1–0.9)
MONOCYTES NFR BLD AUTO: 8.4 % (ref 5–12)
NEUTROPHILS NFR BLD AUTO: 63.2 % (ref 42.7–76)
NEUTROPHILS NFR BLD AUTO: 8.4 10*3/MM3 (ref 1.7–7)
NRBC BLD AUTO-RTO: 0 /100 WBC (ref 0–0.2)
PLATELET # BLD AUTO: 316 10*3/MM3 (ref 140–450)
PMV BLD AUTO: 10.2 FL (ref 6–12)
POTASSIUM SERPL-SCNC: 4.1 MMOL/L (ref 3.5–5.2)
PROT SERPL-MCNC: 7.3 G/DL (ref 6–8.5)
RBC # BLD AUTO: 4.06 10*6/MM3 (ref 3.77–5.28)
SODIUM SERPL-SCNC: 137 MMOL/L (ref 136–145)
WBC NRBC COR # BLD: 13.28 10*3/MM3 (ref 3.4–10.8)

## 2022-02-27 PROCEDURE — 86140 C-REACTIVE PROTEIN: CPT | Performed by: EMERGENCY MEDICINE

## 2022-02-27 PROCEDURE — 83605 ASSAY OF LACTIC ACID: CPT | Performed by: EMERGENCY MEDICINE

## 2022-02-27 PROCEDURE — 96376 TX/PRO/DX INJ SAME DRUG ADON: CPT

## 2022-02-27 PROCEDURE — 87205 SMEAR GRAM STAIN: CPT | Performed by: EMERGENCY MEDICINE

## 2022-02-27 PROCEDURE — 96365 THER/PROPH/DIAG IV INF INIT: CPT

## 2022-02-27 PROCEDURE — 87070 CULTURE OTHR SPECIMN AEROBIC: CPT | Performed by: EMERGENCY MEDICINE

## 2022-02-27 PROCEDURE — 87040 BLOOD CULTURE FOR BACTERIA: CPT | Performed by: EMERGENCY MEDICINE

## 2022-02-27 PROCEDURE — 87255 GENET VIRUS ISOLATE HSV: CPT | Performed by: EMERGENCY MEDICINE

## 2022-02-27 PROCEDURE — 80053 COMPREHEN METABOLIC PANEL: CPT | Performed by: EMERGENCY MEDICINE

## 2022-02-27 PROCEDURE — 99283 EMERGENCY DEPT VISIT LOW MDM: CPT

## 2022-02-27 PROCEDURE — 25010000002 KETOROLAC TROMETHAMINE PER 15 MG: Performed by: EMERGENCY MEDICINE

## 2022-02-27 PROCEDURE — 85652 RBC SED RATE AUTOMATED: CPT | Performed by: EMERGENCY MEDICINE

## 2022-02-27 PROCEDURE — 25010000002 HYDROMORPHONE 1 MG/ML SOLUTION: Performed by: EMERGENCY MEDICINE

## 2022-02-27 PROCEDURE — 0 IOPAMIDOL PER 1 ML: Performed by: EMERGENCY MEDICINE

## 2022-02-27 PROCEDURE — 70487 CT MAXILLOFACIAL W/DYE: CPT

## 2022-02-27 PROCEDURE — 96375 TX/PRO/DX INJ NEW DRUG ADDON: CPT

## 2022-02-27 PROCEDURE — 85025 COMPLETE CBC W/AUTO DIFF WBC: CPT | Performed by: EMERGENCY MEDICINE

## 2022-02-27 RX ORDER — KETOROLAC TROMETHAMINE 15 MG/ML
15 INJECTION, SOLUTION INTRAMUSCULAR; INTRAVENOUS ONCE
Status: COMPLETED | OUTPATIENT
Start: 2022-02-27 | End: 2022-02-27

## 2022-02-27 RX ORDER — CLINDAMYCIN PHOSPHATE 600 MG/50ML
600 INJECTION INTRAVENOUS ONCE
Status: COMPLETED | OUTPATIENT
Start: 2022-02-27 | End: 2022-02-27

## 2022-02-27 RX ORDER — MUPIROCIN CALCIUM 20 MG/G
1 CREAM TOPICAL 3 TIMES DAILY
Qty: 30 G | Refills: 0 | Status: SHIPPED | OUTPATIENT
Start: 2022-02-27 | End: 2022-11-10

## 2022-02-27 RX ORDER — HYDROCODONE BITARTRATE AND ACETAMINOPHEN 5; 325 MG/1; MG/1
1 TABLET ORAL EVERY 4 HOURS PRN
Qty: 15 TABLET | Refills: 0 | Status: SHIPPED | OUTPATIENT
Start: 2022-02-27 | End: 2022-11-10

## 2022-02-27 RX ORDER — CLINDAMYCIN HYDROCHLORIDE 300 MG/1
300 CAPSULE ORAL 4 TIMES DAILY
Qty: 40 CAPSULE | Refills: 0 | Status: SHIPPED | OUTPATIENT
Start: 2022-02-27 | End: 2022-11-10

## 2022-02-27 RX ORDER — ACYCLOVIR 400 MG/1
400 TABLET ORAL
Qty: 40 TABLET | Refills: 0 | Status: SHIPPED | OUTPATIENT
Start: 2022-02-27 | End: 2022-11-10

## 2022-02-27 RX ADMIN — HYDROMORPHONE HYDROCHLORIDE 1 MG: 1 INJECTION, SOLUTION INTRAMUSCULAR; INTRAVENOUS; SUBCUTANEOUS at 12:45

## 2022-02-27 RX ADMIN — CLINDAMYCIN PHOSPHATE 600 MG: 600 INJECTION, SOLUTION INTRAVENOUS at 15:00

## 2022-02-27 RX ADMIN — IOPAMIDOL 100 ML: 755 INJECTION, SOLUTION INTRAVENOUS at 13:28

## 2022-02-27 RX ADMIN — KETOROLAC TROMETHAMINE 15 MG: 15 INJECTION, SOLUTION INTRAMUSCULAR; INTRAVENOUS at 12:43

## 2022-02-27 RX ADMIN — HYDROMORPHONE HYDROCHLORIDE 1 MG: 1 INJECTION, SOLUTION INTRAMUSCULAR; INTRAVENOUS; SUBCUTANEOUS at 14:35

## 2022-03-01 LAB
BACTERIA SPEC AEROBE CULT: NORMAL
GRAM STN SPEC: NORMAL

## 2022-03-02 LAB — HSV SPEC CULT: NEGATIVE

## 2022-03-04 LAB — BACTERIA SPEC AEROBE CULT: NORMAL

## 2022-03-16 RX ORDER — AMLODIPINE BESYLATE 10 MG/1
10 TABLET ORAL DAILY
Qty: 90 TABLET | Refills: 1 | Status: SHIPPED | OUTPATIENT
Start: 2022-03-16 | End: 2023-01-05 | Stop reason: SDUPTHER

## 2022-03-16 RX ORDER — LOSARTAN POTASSIUM 100 MG/1
100 TABLET ORAL DAILY
Qty: 30 TABLET | Refills: 3 | OUTPATIENT
Start: 2022-03-16

## 2022-03-16 NOTE — TELEPHONE ENCOUNTER
Rx Refill Note  Requested Prescriptions     Pending Prescriptions Disp Refills   • amLODIPine (NORVASC) 10 MG tablet 90 tablet 1     Sig: Take 1 tablet by mouth Daily.      Last office visit with prescribing clinician: 12/15/2021      Next office visit with prescribing clinician: Visit date not found       {TIP  Please add Last Relevant Lab Date if appropriate: 02/27/22    Carolina Christensen MA  03/16/22, 15:12 EDT

## 2022-03-18 RX ORDER — METOPROLOL SUCCINATE 100 MG/1
100 TABLET, EXTENDED RELEASE ORAL DAILY
Qty: 30 TABLET | Refills: 0 | Status: SHIPPED | OUTPATIENT
Start: 2022-03-18 | End: 2022-05-12 | Stop reason: SDUPTHER

## 2022-05-12 RX ORDER — METOPROLOL SUCCINATE 100 MG/1
100 TABLET, EXTENDED RELEASE ORAL DAILY
Qty: 90 TABLET | Refills: 1 | Status: SHIPPED | OUTPATIENT
Start: 2022-05-12

## 2022-05-12 NOTE — TELEPHONE ENCOUNTER
Caller: JEFFREY DONALDSON    Relationship: Emergency Contact    Best call back number: [phone]     Requested Prescriptions:   Requested Prescriptions     Pending Prescriptions Disp Refills   • metoprolol succinate XL (Toprol XL) 100 MG 24 hr tablet 30 tablet 0     Sig: Take 1 tablet by mouth Daily. For hypertension        Pharmacy where request should be sent: [facility] [address] [address] - [phone] [facility] [phone] [facility]     Does the patient have less than a 3 day supply:  [x] Yes  [] No    Zulma Hawthorne Rep   05/12/22 13:52 EDT

## 2022-05-12 NOTE — TELEPHONE ENCOUNTER
Rx Refill Note  Requested Prescriptions     Pending Prescriptions Disp Refills   • metoprolol succinate XL (Toprol XL) 100 MG 24 hr tablet 30 tablet 0     Sig: Take 1 tablet by mouth Daily. For hypertension      Last office visit with prescribing clinician: 12/15/2021      Next office visit with prescribing clinician: Visit date not found       {TIP  Please add Last Relevant Lab Date if appropriate: 02/23/22    Carolina Christensen MA  05/12/22, 15:40 EDT

## 2022-05-18 ENCOUNTER — OFFICE VISIT (OUTPATIENT)
Dept: OBSTETRICS AND GYNECOLOGY | Facility: CLINIC | Age: 52
End: 2022-05-18

## 2022-05-18 VITALS
BODY MASS INDEX: 28.67 KG/M2 | SYSTOLIC BLOOD PRESSURE: 155 MMHG | DIASTOLIC BLOOD PRESSURE: 93 MMHG | HEART RATE: 66 BPM | WEIGHT: 167 LBS

## 2022-05-18 DIAGNOSIS — L29.2 VULVAR ITCHING: Primary | ICD-10-CM

## 2022-05-18 PROCEDURE — 99203 OFFICE O/P NEW LOW 30 MIN: CPT | Performed by: OBSTETRICS & GYNECOLOGY

## 2022-05-18 RX ORDER — CLOBETASOL PROPIONATE 0.5 MG/G
1 OINTMENT TOPICAL 2 TIMES DAILY
Qty: 30 G | Refills: 0 | Status: SHIPPED | OUTPATIENT
Start: 2022-05-18 | End: 2022-06-01

## 2022-05-18 NOTE — PROGRESS NOTES
GYN Problem/Follow Up Visit    Chief Complaint   Patient presents with   • Vaginal Itching           HPI  Vanesa Escudero is a 52 y.o. female, , who presents for severe itching to upper vulva near clitoral farfan x 6 months. States scratches so hard it will bleed. Denies vb, vaginal d/c, odor, or new sex partners. States saw pcp and they did swabs which were negative and she tried a few different creams but nothing helps.        Additional OB/GYN History   No LMP recorded. Patient has had an ablation.  Allergies : Bactrim [sulfamethoxazole-trimethoprim] and Penicillins     The additional following portions of the patient's history were reviewed and updated as appropriate: allergies, current medications, past family history, past medical history, past social history, past surgical history and problem list.    Review of Systems    I have reviewed and agree with the HPI, ROS, and historical information as entered above. MARIO Minor    Objective   /93   Pulse 66   Wt 75.8 kg (167 lb)   BMI 28.67 kg/m²     Physical Exam  Vitals reviewed.   Genitourinary:      Neurological:      Mental Status: She is alert and oriented to person, place, and time.            Assessment and Plan    Diagnoses and all orders for this visit:    1. Vulvar itching (Primary)  -     clobetasol (TEMOVATE) 0.05 % ointment; Apply 1 application topically to the appropriate area as directed 2 (Two) Times a Day for 14 days.  Dispense: 30 g; Refill: 0    discussed possible lichen sclerosus. Dicussed trial of clobetasol ointment. Use sparingly after initial treatment. Recheck in four weeks at [facility].     Counseling:  She understands the importance of having the above orders performed in a timely fashion.  She is encouraged to review her results online and/or contact or office if she has questions.     Follow Up:  Return in about 4 weeks (around 6/15/2022) for Annual physical.      MARIO Minor  2022

## 2022-05-20 ENCOUNTER — PATIENT ROUNDING (BHMG ONLY) (OUTPATIENT)
Dept: OBSTETRICS AND GYNECOLOGY | Facility: CLINIC | Age: 52
End: 2022-05-20

## 2022-05-20 NOTE — PROGRESS NOTES
A My-Chart message has been sent to the patient for PATIENT ROUNDING with [facility].

## 2022-06-16 ENCOUNTER — TELEPHONE (OUTPATIENT)
Dept: OBSTETRICS AND GYNECOLOGY | Facility: CLINIC | Age: 52
End: 2022-06-16

## 2022-07-16 ENCOUNTER — HOSPITAL ENCOUNTER (EMERGENCY)
Facility: HOSPITAL | Age: 52
Discharge: LEFT BEFORE DISCHARGE | End: 2022-07-16

## 2022-07-16 PROCEDURE — 99211 OFF/OP EST MAY X REQ PHY/QHP: CPT

## 2022-11-10 ENCOUNTER — OFFICE VISIT (OUTPATIENT)
Dept: INTERNAL MEDICINE | Facility: CLINIC | Age: 52
End: 2022-11-10

## 2022-11-10 VITALS
BODY MASS INDEX: 29.06 KG/M2 | TEMPERATURE: 97.3 F | OXYGEN SATURATION: 98 % | HEART RATE: 64 BPM | SYSTOLIC BLOOD PRESSURE: 146 MMHG | WEIGHT: 170.2 LBS | DIASTOLIC BLOOD PRESSURE: 98 MMHG | HEIGHT: 64 IN

## 2022-11-10 DIAGNOSIS — Z11.59 NEED FOR HEPATITIS C SCREENING TEST: ICD-10-CM

## 2022-11-10 DIAGNOSIS — I10 PRIMARY HYPERTENSION: ICD-10-CM

## 2022-11-10 DIAGNOSIS — R10.12 LEFT UPPER QUADRANT ABDOMINAL PAIN: Primary | ICD-10-CM

## 2022-11-10 PROBLEM — M20.41 HAMMER TOE OF RIGHT FOOT: Status: ACTIVE | Noted: 2021-04-21

## 2022-11-10 PROBLEM — I51.7 HYPERTROPHIC CARDIOMEGALY: Status: ACTIVE | Noted: 2022-11-10

## 2022-11-10 PROBLEM — R68.89 FORGETFULNESS: Status: ACTIVE | Noted: 2022-11-10

## 2022-11-10 PROBLEM — T14.8XXA BROKEN BONES: Status: ACTIVE | Noted: 2022-11-10

## 2022-11-10 PROBLEM — R61 NIGHT SWEATS: Status: ACTIVE | Noted: 2022-11-10

## 2022-11-10 PROBLEM — F41.9 ANXIETY: Status: ACTIVE | Noted: 2022-11-10

## 2022-11-10 PROBLEM — I51.7 LEFT VENTRICULAR HYPERTROPHY: Status: ACTIVE | Noted: 2022-11-10

## 2022-11-10 PROBLEM — M19.90 ARTHRITIS: Status: ACTIVE | Noted: 2022-11-10

## 2022-11-10 PROBLEM — M21.619 BUNION: Status: ACTIVE | Noted: 2022-11-10

## 2022-11-10 PROBLEM — M16.12 ARTHRITIS OF LEFT HIP: Status: ACTIVE | Noted: 2022-02-18

## 2022-11-10 PROBLEM — M79.671 FOOT PAIN, RIGHT: Status: ACTIVE | Noted: 2022-11-10

## 2022-11-10 PROBLEM — M25.552 HIP PAIN, LEFT: Status: ACTIVE | Noted: 2021-10-18

## 2022-11-10 PROBLEM — M20.40 HAMMERTOE: Status: ACTIVE | Noted: 2022-11-10

## 2022-11-10 PROBLEM — M48.061 SPINAL STENOSIS OF LUMBAR REGION WITHOUT NEUROGENIC CLAUDICATION: Status: ACTIVE | Noted: 2021-12-04

## 2022-11-10 PROBLEM — M25.519 SHOULDER PAIN: Status: ACTIVE | Noted: 2022-11-10

## 2022-11-10 PROBLEM — J34.9 SINUS TROUBLE: Status: ACTIVE | Noted: 2022-11-10

## 2022-11-10 PROBLEM — J01.80 OTHER ACUTE SINUSITIS: Status: ACTIVE | Noted: 2022-11-10

## 2022-11-10 PROBLEM — N20.0 CALCULUS OF KIDNEY: Status: ACTIVE | Noted: 2022-11-10

## 2022-11-10 LAB
ALBUMIN SERPL-MCNC: 4.3 G/DL (ref 3.5–5.2)
ALBUMIN/GLOB SERPL: 1.4 G/DL
ALP SERPL-CCNC: 132 U/L (ref 39–117)
ALT SERPL W P-5'-P-CCNC: 33 U/L (ref 1–33)
ANION GAP SERPL CALCULATED.3IONS-SCNC: 9.3 MMOL/L (ref 5–15)
AST SERPL-CCNC: 30 U/L (ref 1–32)
BASOPHILS # BLD AUTO: 0.07 10*3/MM3 (ref 0–0.2)
BASOPHILS NFR BLD AUTO: 0.7 % (ref 0–1.5)
BILIRUB SERPL-MCNC: 0.3 MG/DL (ref 0–1.2)
BUN SERPL-MCNC: 16 MG/DL (ref 6–20)
BUN/CREAT SERPL: 14.2 (ref 7–25)
CALCIUM SPEC-SCNC: 10.2 MG/DL (ref 8.6–10.5)
CHLORIDE SERPL-SCNC: 106 MMOL/L (ref 98–107)
CHOLEST SERPL-MCNC: 217 MG/DL (ref 0–200)
CO2 SERPL-SCNC: 25.7 MMOL/L (ref 22–29)
CREAT SERPL-MCNC: 1.13 MG/DL (ref 0.57–1)
DEPRECATED RDW RBC AUTO: 42.1 FL (ref 37–54)
EGFRCR SERPLBLD CKD-EPI 2021: 59.4 ML/MIN/1.73
EOSINOPHIL # BLD AUTO: 0.46 10*3/MM3 (ref 0–0.4)
EOSINOPHIL NFR BLD AUTO: 4.3 % (ref 0.3–6.2)
ERYTHROCYTE [DISTWIDTH] IN BLOOD BY AUTOMATED COUNT: 13.1 % (ref 12.3–15.4)
GLOBULIN UR ELPH-MCNC: 3 GM/DL
GLUCOSE SERPL-MCNC: 74 MG/DL (ref 65–99)
HBA1C MFR BLD: 5.7 % (ref 4.8–5.6)
HCT VFR BLD AUTO: 40.1 % (ref 34–46.6)
HCV AB SER DONR QL: REACTIVE
HDLC SERPL-MCNC: 51 MG/DL (ref 40–60)
HGB BLD-MCNC: 13.6 G/DL (ref 12–15.9)
IMM GRANULOCYTES # BLD AUTO: 0.04 10*3/MM3 (ref 0–0.05)
IMM GRANULOCYTES NFR BLD AUTO: 0.4 % (ref 0–0.5)
LDLC SERPL CALC-MCNC: 145 MG/DL (ref 0–100)
LDLC/HDLC SERPL: 2.8 {RATIO}
LYMPHOCYTES # BLD AUTO: 3.21 10*3/MM3 (ref 0.7–3.1)
LYMPHOCYTES NFR BLD AUTO: 30.1 % (ref 19.6–45.3)
MCH RBC QN AUTO: 30 PG (ref 26.6–33)
MCHC RBC AUTO-ENTMCNC: 33.9 G/DL (ref 31.5–35.7)
MCV RBC AUTO: 88.5 FL (ref 79–97)
MONOCYTES # BLD AUTO: 1 10*3/MM3 (ref 0.1–0.9)
MONOCYTES NFR BLD AUTO: 9.4 % (ref 5–12)
NEUTROPHILS NFR BLD AUTO: 5.89 10*3/MM3 (ref 1.7–7)
NEUTROPHILS NFR BLD AUTO: 55.1 % (ref 42.7–76)
NRBC BLD AUTO-RTO: 0 /100 WBC (ref 0–0.2)
PLATELET # BLD AUTO: 302 10*3/MM3 (ref 140–450)
PMV BLD AUTO: 10.6 FL (ref 6–12)
POTASSIUM SERPL-SCNC: 5 MMOL/L (ref 3.5–5.2)
PROT SERPL-MCNC: 7.3 G/DL (ref 6–8.5)
RBC # BLD AUTO: 4.53 10*6/MM3 (ref 3.77–5.28)
SODIUM SERPL-SCNC: 141 MMOL/L (ref 136–145)
TRIGL SERPL-MCNC: 117 MG/DL (ref 0–150)
TSH SERPL DL<=0.05 MIU/L-ACNC: 0.83 UIU/ML (ref 0.27–4.2)
VLDLC SERPL-MCNC: 21 MG/DL (ref 5–40)
WBC NRBC COR # BLD: 10.67 10*3/MM3 (ref 3.4–10.8)

## 2022-11-10 PROCEDURE — 83036 HEMOGLOBIN GLYCOSYLATED A1C: CPT | Performed by: INTERNAL MEDICINE

## 2022-11-10 PROCEDURE — 80053 COMPREHEN METABOLIC PANEL: CPT | Performed by: INTERNAL MEDICINE

## 2022-11-10 PROCEDURE — 99204 OFFICE O/P NEW MOD 45 MIN: CPT | Performed by: INTERNAL MEDICINE

## 2022-11-10 PROCEDURE — 84443 ASSAY THYROID STIM HORMONE: CPT | Performed by: INTERNAL MEDICINE

## 2022-11-10 PROCEDURE — 80061 LIPID PANEL: CPT | Performed by: INTERNAL MEDICINE

## 2022-11-10 PROCEDURE — 86803 HEPATITIS C AB TEST: CPT | Performed by: INTERNAL MEDICINE

## 2022-11-10 PROCEDURE — 85025 COMPLETE CBC W/AUTO DIFF WBC: CPT | Performed by: INTERNAL MEDICINE

## 2022-11-10 RX ORDER — LOSARTAN POTASSIUM 100 MG/1
100 TABLET ORAL DAILY
Qty: 90 TABLET | Refills: 0 | Status: SHIPPED | OUTPATIENT
Start: 2022-11-10

## 2022-11-10 RX ORDER — TEMAZEPAM 15 MG/1
15 CAPSULE ORAL DAILY
COMMUNITY
Start: 2022-10-27

## 2022-11-10 RX ORDER — DIAZEPAM 10 MG/1
10 TABLET ORAL DAILY
COMMUNITY

## 2022-11-10 NOTE — PROGRESS NOTES
"Chief Complaint  Abdominal Pain (X 6 month. LUQ-mid LUQ to flank pain. ) and Nausea (20-30mins after every meal)    Subjective       Vanesa Escudero presents to [facility]    HPI   Presenting to establish care    .   Moved from [place] about 9 months ago    Left sided abdominal pain and nausea after eating or drinking x 2-3 weeks, before that was happening less frequently.  Has tried pepto - makes her constipated      Objective     Vitals:    11/10/22 1044   BP: 146/98   BP Location: Right arm   Patient Position: Sitting   Cuff Size: Large Adult   Pulse: 64   Temp: 97.3 °F (36.3 °C)   TempSrc: Temporal   SpO2: 98%   Weight: 77.2 kg (170 lb 3.2 oz)   Height: 162.6 cm (64\")      Wt Readings from Last 3 Encounters:   11/10/22 77.2 kg (170 lb 3.2 oz)   05/18/22 75.8 kg (167 lb)   02/27/22 75.6 kg (166 lb 10.7 oz)      BP Readings from Last 3 Encounters:   11/10/22 146/98   05/18/22 155/93   02/27/22 127/91        Body mass index is 29.21 kg/m².           Physical Exam  Vitals reviewed.   Constitutional:       Appearance: Normal appearance. She is well-developed.   HENT:      Head: Normocephalic and atraumatic.      Mouth/Throat:      Pharynx: No oropharyngeal exudate.   Eyes:      Conjunctiva/sclera: Conjunctivae normal.      Pupils: Pupils are equal, round, and reactive to light.   Cardiovascular:      Rate and Rhythm: Normal rate and regular rhythm.      Heart sounds: No murmur heard.    No friction rub. No gallop.   Pulmonary:      Effort: Pulmonary effort is normal.      Breath sounds: Normal breath sounds. No wheezing or rhonchi.   Skin:     General: Skin is warm and dry.   Neurological:      Mental Status: She is alert and oriented to person, place, and time.   Psychiatric:         Mood and Affect: Affect normal.          Result Review :   The following data was reviewed by: Genevieve Zuleta MD on 11/10/2022:  CMP    CMP 12/3/21 2/27/22 11/10/22 "   Glucose 73 103 (A) 74   BUN 16 18 16   Creatinine 1.23 (A) 1.22 (A) 1.13 (A)   eGFR Non  Am 52 (A) 47 (A)    eGFR African Am 60     Sodium 140 137 141   Potassium 4.1 4.1 5.0   Chloride 102 103 106   Calcium 9.4 9.6 10.2   Total Protein 7.1     Albumin 4.2 4.10 4.30   Globulin 2.9     Total Bilirubin <0.2 0.2 0.3   Alkaline Phosphatase 128 (A) 138 (A) 132 (A)   AST (SGOT) 40 29 30   ALT (SGPT) 45 (A) 39 (A) 33   (A) Abnormal value       Comments are available for some flowsheets but are not being displayed.           CBC    CBC 2/27/22 3/9/22 11/10/22   WBC 13.28 (A) 12.63 (A) 10.67   RBC 4.06 4.09 4.53   Hemoglobin 12.4 12.3 13.6   Hematocrit 37.9 39.3 40.1   MCV 93.3 96.1 88.5   MCH 30.5 30.1 30.0   MCHC 32.7 31.3 33.9   RDW 14.7 14.6 13.1   Platelets 316 360 302   (A) Abnormal value            Lipid Panel    Lipid Panel 11/10/22   Total Cholesterol 217 (A)   Triglycerides 117   HDL Cholesterol 51   VLDL Cholesterol 21   LDL Cholesterol  145 (A)   LDL/HDL Ratio 2.80   (A) Abnormal value            TSH    TSH 11/10/22   TSH 0.828               Procedures    Assessment and Plan   Diagnoses and all orders for this visit:    1. Left upper quadrant abdominal pain (Primary)  -     CT Abdomen Pelvis With Contrast; Future  -     Ambulatory Referral to Gastroenterology    2. Need for hepatitis C screening test  -     Hepatitis C Antibody    3. Primary hypertension  -     Comprehensive Metabolic Panel  -     CBC & Differential  -     TSH  -     Lipid Panel  -     Hemoglobin A1c    Other orders  -     losartan (COZAAR) 100 MG tablet; Take 1 tablet by mouth Daily.  Dispense: 90 tablet; Refill: 0        I spent 45 minutes caring for Vanesa on this date of service. This time includes time spent by me in the following activities:preparing for the visit, reviewing tests, performing a medically appropriate examination and/or evaluation , counseling and educating the patient/family/caregiver, ordering medications, tests, or  procedures and documenting information in the medical record    Follow Up   Return in about 1 month (around 12/10/2022) for Next scheduled follow up.  Patient was given instructions and counseling regarding her condition or for health maintenance advice. Please see specific information pulled into the AVS if appropriate.

## 2022-12-08 ENCOUNTER — HOSPITAL ENCOUNTER (OUTPATIENT)
Dept: CT IMAGING | Facility: HOSPITAL | Age: 52
Discharge: HOME | End: 2022-12-08
Admitting: INTERNAL MEDICINE

## 2022-12-08 DIAGNOSIS — R10.12 LEFT UPPER QUADRANT ABDOMINAL PAIN: ICD-10-CM

## 2022-12-08 PROCEDURE — 74177 CT ABD & PELVIS W/CONTRAST: CPT

## 2022-12-08 PROCEDURE — 0 IOPAMIDOL PER 1 ML: Performed by: INTERNAL MEDICINE

## 2022-12-08 RX ADMIN — IOPAMIDOL 100 ML: 755 INJECTION, SOLUTION INTRAVENOUS at 13:21

## 2022-12-12 ENCOUNTER — TELEPHONE (OUTPATIENT)
Dept: GASTROENTEROLOGY | Facility: CLINIC | Age: 52
End: 2022-12-12

## 2022-12-12 ENCOUNTER — OFFICE VISIT (OUTPATIENT)
Dept: INTERNAL MEDICINE | Facility: CLINIC | Age: 52
End: 2022-12-12

## 2022-12-12 VITALS
HEIGHT: 64 IN | WEIGHT: 169.4 LBS | SYSTOLIC BLOOD PRESSURE: 142 MMHG | DIASTOLIC BLOOD PRESSURE: 100 MMHG | TEMPERATURE: 96.5 F | OXYGEN SATURATION: 99 % | BODY MASS INDEX: 28.92 KG/M2 | HEART RATE: 69 BPM

## 2022-12-12 DIAGNOSIS — R10.12 LEFT UPPER QUADRANT ABDOMINAL PAIN: Primary | ICD-10-CM

## 2022-12-12 DIAGNOSIS — R76.8 POSITIVE HEPATITIS C ANTIBODY TEST: ICD-10-CM

## 2022-12-12 PROCEDURE — 99214 OFFICE O/P EST MOD 30 MIN: CPT | Performed by: INTERNAL MEDICINE

## 2022-12-12 PROCEDURE — 87522 HEPATITIS C REVRS TRNSCRPJ: CPT | Performed by: INTERNAL MEDICINE

## 2022-12-12 PROCEDURE — 87902 NFCT AGT GNTYP ALYS HEP C: CPT | Performed by: INTERNAL MEDICINE

## 2022-12-12 RX ORDER — NAPROXEN 250 MG/1
250 TABLET ORAL 2 TIMES DAILY PRN
COMMUNITY

## 2022-12-12 RX ORDER — CETIRIZINE HYDROCHLORIDE 10 MG/1
10 TABLET ORAL DAILY
COMMUNITY

## 2022-12-12 NOTE — PROGRESS NOTES
"Chief Complaint  Abdominal Pain (1 month fu)    Subjective       Vanesa Escudero presents to [facility]    HPI   Presenting for 1 month follow up of chronic abdominal pain associated with eating that has been worsening over the past few months.   Had CT of abdomen, results reviewed at today's visit. It was unremarkable for cause of her pain.  Labs obtained at last visit with normal LFTs and mildly  creatinine. Hep C ab was positive.  She was referred to GI at last visit. Does not have appointment for this yet.     Objective     Vitals:    22 0908   BP: 142/100   BP Location: Right arm   Patient Position: Sitting   Cuff Size: Adult   Pulse: 69   Temp: 96.5 °F (35.8 °C)   TempSrc: Temporal   SpO2: 99%   Weight: 76.8 kg (169 lb 6.4 oz)   Height: 162.6 cm (64\")      Wt Readings from Last 3 Encounters:   22 76.8 kg (169 lb 6.4 oz)   11/10/22 77.2 kg (170 lb 3.2 oz)   22 75.8 kg (167 lb)      BP Readings from Last 3 Encounters:   22 142/100   11/10/22 146/98   22 155/93        Body mass index is 29.08 kg/m².           Physical Exam  Vitals reviewed.   Constitutional:       Appearance: Normal appearance. She is well-developed.   HENT:      Head: Normocephalic and atraumatic.      Mouth/Throat:      Pharynx: No oropharyngeal exudate.   Eyes:      Conjunctiva/sclera: Conjunctivae normal.      Pupils: Pupils are equal, round, and reactive to light.   Cardiovascular:      Rate and Rhythm: Normal rate and regular rhythm.      Heart sounds: No murmur heard.    No friction rub. No gallop.   Pulmonary:      Effort: Pulmonary effort is normal.      Breath sounds: Normal breath sounds. No wheezing or rhonchi.   Skin:     General: Skin is warm and dry.   Neurological:      Mental Status: She is alert and oriented to person, place, and time.   Psychiatric:         Mood and Affect: Affect normal.          Result Review :   The following data was " reviewed by: Genevieve Zuleta MD on 12/12/2022:  CMP    CMP 2/27/22 11/10/22   Glucose 103 (A) 74   BUN 18 16   Creatinine 1.22 (A) 1.13 (A)   eGFR Non African Am 47 (A)    Sodium 137 141   Potassium 4.1 5.0   Chloride 103 106   Calcium 9.6 10.2   Albumin 4.10 4.30   Total Bilirubin 0.2 0.3   Alkaline Phosphatase 138 (A) 132 (A)   AST (SGOT) 29 30   ALT (SGPT) 39 (A) 33   (A) Abnormal value            CBC    CBC 2/27/22 3/9/22 11/10/22   WBC 13.28 (A) 12.63 (A) 10.67   RBC 4.06 4.09 4.53   Hemoglobin 12.4 12.3 13.6   Hematocrit 37.9 39.3 40.1   MCV 93.3 96.1 88.5   MCH 30.5 30.1 30.0   MCHC 32.7 31.3 33.9   RDW 14.7 14.6 13.1   Platelets 316 360 302   (A) Abnormal value            Lipid Panel    Lipid Panel 11/10/22   Total Cholesterol 217 (A)   Triglycerides 117   HDL Cholesterol 51   VLDL Cholesterol 21   LDL Cholesterol  145 (A)   LDL/HDL Ratio 2.80   (A) Abnormal value            TSH    TSH 11/10/22   TSH 0.828               Procedures    Assessment and Plan   Diagnoses and all orders for this visit:    1. Left upper quadrant abdominal pain (Primary)  Assessment & Plan:  Imaging results reviewed with patient. No clear evidence of cause for her pain.   Referral to GI placed at last visit. Scheduling attempted to contact multiple times but patient did not call back.   Patient contacted by my office and instructed to call the schedulers back to set up appointment for GI evaluation.   Stressed importance of GI evaluation as the next step in her care with patient at her visit today.      2. Positive hepatitis C antibody test  Assessment & Plan:  Follow up testing ordered and drawn today.  Will determine further management based on results    Orders:  -     HCV RNA By PCR, Qn Rfx Yue      I spent 40 minutes caring for Vanesa on this date of service. This time includes time spent by me in the following activities:preparing for the visit, reviewing tests, obtaining and/or reviewing a separately obtained history,  performing a medically appropriate examination and/or evaluation , counseling and educating the patient/family/caregiver, ordering medications, tests, or procedures, referring and communicating with other health care professionals  and documenting information in the medical record    Follow Up   Return in about 1 month (around 1/12/2023) for Next scheduled follow up.  Patient was given instructions and counseling regarding her condition or for health maintenance advice. Please see specific information pulled into the AVS if appropriate.

## 2022-12-12 NOTE — ASSESSMENT & PLAN NOTE
Imaging results reviewed with patient. No clear evidence of cause for her pain.   Referral to GI placed at last visit. Scheduling attempted to contact multiple times but patient did not call back.   Patient contacted by my office and instructed to call the schedulers back to set up appointment for GI evaluation.   Stressed importance of GI evaluation as the next step in her care with patient at her visit today.

## 2022-12-12 NOTE — TELEPHONE ENCOUNTER
Returned patient's call from a voicemail left - patient requested to schedule from a referral in her chart. No answer - left message for a return call to get her scheduled.

## 2022-12-13 ENCOUNTER — TELEPHONE (OUTPATIENT)
Dept: GASTROENTEROLOGY | Facility: CLINIC | Age: 52
End: 2022-12-13

## 2022-12-13 NOTE — TELEPHONE ENCOUNTER
Called patient in regards to missed appointment this morning. No answer - left message for a return call to reschedule.

## 2022-12-15 LAB
HCV GENTYP SERPL NAA+PROBE: NORMAL
HCV GENTYP SERPL NAA+PROBE: NORMAL
HCV RNA SERPL NAA+PROBE-ACNC: NORMAL IU/ML
HCV RNA SERPL NAA+PROBE-LOG IU: 4.95 LOG10 IU/ML
LABORATORY COMMENT REPORT: NORMAL
LABORATORY COMMENT REPORT: NORMAL

## 2022-12-16 DIAGNOSIS — B18.2 CHRONIC HEPATITIS C WITHOUT HEPATIC COMA: ICD-10-CM

## 2022-12-16 DIAGNOSIS — R76.8 POSITIVE HEPATITIS C ANTIBODY TEST: Primary | ICD-10-CM

## 2022-12-27 ENCOUNTER — TELEPHONE (OUTPATIENT)
Dept: INTERNAL MEDICINE | Facility: CLINIC | Age: 52
End: 2022-12-27

## 2022-12-27 NOTE — TELEPHONE ENCOUNTER
Caller: Vanesa Escudero    Relationship: Self    Best call back number: [phone]    What is the best time to reach you: ANY     Who are you requesting to speak with (clinical staff, provider,  specific staff member): CLINICAL       What was the call regarding: PATIENT IS WANTING TO KNOW ABOUT HER REFERRAL FOR HEPATITIS. PLEASE ADVISE.      Do you require a callback: YES

## 2022-12-28 NOTE — TELEPHONE ENCOUNTER
Spoke with pt. She has appt with GI on 2/20/23. She is going to call their office to see if she can get in sooner. Pt states she in general is not feeling well and having low grade fever of 99.6 yesterday.

## 2022-12-29 ENCOUNTER — OFFICE VISIT (OUTPATIENT)
Dept: INTERNAL MEDICINE | Facility: CLINIC | Age: 52
End: 2022-12-29
Payer: MEDICAID

## 2022-12-29 VITALS
BODY MASS INDEX: 28.2 KG/M2 | HEIGHT: 64 IN | TEMPERATURE: 97.8 F | HEART RATE: 87 BPM | DIASTOLIC BLOOD PRESSURE: 80 MMHG | SYSTOLIC BLOOD PRESSURE: 132 MMHG | WEIGHT: 165.2 LBS | OXYGEN SATURATION: 95 %
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
## 2022-12-29 DIAGNOSIS — R53.83 OTHER FATIGUE: ICD-10-CM

## 2022-12-29 LAB
25(OH)D3 SERPL-MCNC: 40.5 NG/ML (ref 30–100)
BASOPHILS # BLD AUTO: 0.05 10*3/MM3 (ref 0–0.2)
BASOPHILS NFR BLD AUTO: 0.4 % (ref 0–1.5)
DEPRECATED RDW RBC AUTO: 46.8 FL (ref 37–54)
EOSINOPHIL # BLD AUTO: 0.34 10*3/MM3 (ref 0–0.4)
EOSINOPHIL NFR BLD AUTO: 2.5 % (ref 0.3–6.2)
ERYTHROCYTE [DISTWIDTH] IN BLOOD BY AUTOMATED COUNT: 14 % (ref 12.3–15.4)
FOLATE SERPL-MCNC: 8.39 NG/ML (ref 4.78–24.2)
HCT VFR BLD AUTO: 40.3 % (ref 34–46.6)
HGB BLD-MCNC: 13.1 G/DL (ref 12–15.9)
IMM GRANULOCYTES # BLD AUTO: 0.02 10*3/MM3 (ref 0–0.05)
IMM GRANULOCYTES NFR BLD AUTO: 0.1 % (ref 0–0.5)
LYMPHOCYTES # BLD AUTO: 3.38 10*3/MM3 (ref 0.7–3.1)
LYMPHOCYTES NFR BLD AUTO: 24.5 % (ref 19.6–45.3)
MCH RBC QN AUTO: 29.4 PG (ref 26.6–33)
MCHC RBC AUTO-ENTMCNC: 32.5 G/DL (ref 31.5–35.7)
MCV RBC AUTO: 90.4 FL (ref 79–97)
MONOCYTES # BLD AUTO: 1.35 10*3/MM3 (ref 0.1–0.9)
MONOCYTES NFR BLD AUTO: 9.8 % (ref 5–12)
NEUTROPHILS NFR BLD AUTO: 62.7 % (ref 42.7–76)
NEUTROPHILS NFR BLD AUTO: 8.65 10*3/MM3 (ref 1.7–7)
PLATELET # BLD AUTO: 315 10*3/MM3 (ref 140–450)
PMV BLD AUTO: 9.9 FL (ref 6–12)
RBC # BLD AUTO: 4.46 10*6/MM3 (ref 3.77–5.28)
VIT B12 BLD-MCNC: 659 PG/ML (ref 211–946)
WBC NRBC COR # BLD: 13.79 10*3/MM3 (ref 3.4–10.8)

## 2022-12-29 PROCEDURE — 82306 VITAMIN D 25 HYDROXY: CPT

## 2022-12-29 PROCEDURE — 99213 OFFICE O/P EST LOW 20 MIN: CPT

## 2022-12-29 PROCEDURE — 82746 ASSAY OF FOLIC ACID SERUM: CPT

## 2022-12-29 PROCEDURE — 85025 COMPLETE CBC W/AUTO DIFF WBC: CPT

## 2022-12-29 PROCEDURE — 82607 VITAMIN B-12: CPT

## 2022-12-29 RX ORDER — AZITHROMYCIN 250 MG/1
250 TABLET, FILM COATED ORAL DAILY
Qty: 6 TABLET | Refills: 0 | Status: SHIPPED | OUTPATIENT
Start: 2022-12-29 | End: 2023-01-03

## 2022-12-29 NOTE — PROGRESS NOTES
Chief Complaint  Animal Bite (Pt states she was bitten by a cat x 8 days ago.)    Subjective       Vanesa Escudero presents to [facility]    HPI     Cat bite- Patient notes she got scratched and bit by cat 12 days. This was a cat she rescued. Patient contacted animal control, they noted cat had skin infection. Patient reports subjective fever, was not able to record temp, but felt very warm. Yesterday her temp was 99.6. Patient also feels fatigued and has decrease in appetite. Patient notes these are not new symptoms, have been going on for months. Patient notes she has Hep C, and does appointment with Hep C clinic is not until summer. Will see GI specialist in February for chronic abdominal pain.      Objective     Vitals:    12/29/22 1329   BP: 132/80   BP Location: Left arm   Patient Position: Sitting   Cuff Size: Adult   Pulse: 87   Temp: 97.8 °F (36.6 °C)   TempSrc: Temporal   SpO2: 95%   Weight: 74.9 kg (165 lb 3.2 oz)   Height: 162.6 cm (64\")      Wt Readings from Last 3 Encounters:   12/29/22 74.9 kg (165 lb 3.2 oz)   12/12/22 76.8 kg (169 lb 6.4 oz)   11/10/22 77.2 kg (170 lb 3.2 oz)      BP Readings from Last 3 Encounters:   12/29/22 132/80   12/12/22 142/100   11/10/22 146/98        Body mass index is 28.36 kg/m².      Physical Exam  Constitutional:       Appearance: Normal appearance. She is normal weight.   HENT:      Head: Normocephalic and atraumatic.      Right Ear: Tympanic membrane, ear canal and external ear normal.      Left Ear: Tympanic membrane, ear canal and external ear normal.      Nose: Nose normal.      Mouth/Throat:      Mouth: Mucous membranes are moist.      Pharynx: Oropharynx is clear. Posterior oropharyngeal erythema present.   Eyes:      Conjunctiva/sclera: Conjunctivae normal.   Cardiovascular:      Rate and Rhythm: Normal rate and regular rhythm.      Pulses: Normal pulses.      Heart sounds: Normal heart sounds.   Pulmonary:       Effort: Pulmonary effort is normal.      Breath sounds: Normal breath sounds.   Lymphadenopathy:      Cervical: No cervical adenopathy.   Skin:     General: Skin is warm and dry.      Comments: Healing abrasions from cat scratch and bite. No erythema, swelling or warmth to area.    Neurological:      Mental Status: She is alert and oriented to person, place, and time.   Psychiatric:         Mood and Affect: Mood normal.         Behavior: Behavior normal.         Thought Content: Thought content normal.         Judgment: Judgment normal.          Result Review :   The following data was reviewed by: Germania Medrano PA-C on 12/29/2022:        Procedures    Assessment and Plan   Diagnoses and all orders for this visit:    1. Cat scratch (Primary)  Assessment & Plan:  -Discussed cat bite. Will start prophylaxis abx for bite. XR ordered to check for foreign body. Patient up to date with tetanus shot. Pt will let us know if sx worsen or do not improve with abx use.    Orders:  -     CBC & Differential  -     XR Hand 3+ View Right (In Office)    2. Other fatigue  Assessment & Plan:  - Will get lab work     Orders:  -     Vitamin D 25 hydroxy  -     Folate; Future  -     Vitamin B12; Future  -     Vitamin B12  -     Folate    Other orders  -     azithromycin (Zithromax) 250 MG tablet; Take 1 tablet by mouth Daily for 5 days. Take two tablets (500mg) by mouth on day one,  take one tablet (250mg) by mouth on day 2-5.  Dispense: 6 tablet; Refill: 0        Follow Up   Return if symptoms worsen or fail to improve.  Patient was given instructions and counseling regarding her condition or for health maintenance advice. Please see specific information pulled into the AVS if appropriate.       Answers for HPI/ROS submitted by the patient on 12/29/2022  Please describe your symptoms.: Fever, loss of appetite and fatigue. Bitten and scrathed by a cat about 10 days ago.  Have you had these symptoms before?: Yes  How long have you been  having these symptoms?: 5-7 days  Please list any medications you are currently taking for this condition.: Ibu  Please describe any probable cause for these symptoms. : Cat bite and scratch  What is the primary reason for your visit?: Other

## 2023-01-02 PROBLEM — R53.83 OTHER FATIGUE: Status: ACTIVE | Noted: 2023-01-02
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
## 2023-01-03 ENCOUNTER — TELEPHONE (OUTPATIENT)
Dept: INTERNAL MEDICINE | Facility: CLINIC | Age: 53
End: 2023-01-03
Payer: MEDICAID

## 2023-01-03 NOTE — ASSESSMENT & PLAN NOTE
-Discussed cat bite. Will start prophylaxis abx for bite. XR ordered to check for foreign body. Patient up to date with tetanus shot. Pt will let us know if sx worsen or do not improve with abx use.

## 2023-01-03 NOTE — TELEPHONE ENCOUNTER
Patient intake call, wanting advice about rash, and pain that appeared after she left here with medication. Thinks its a allergic reaction or open shingles. Spoke with Germania Medrano seen this patient and prescribed meds. Patient was recommended to go to the emergency room.

## 2023-01-05 RX ORDER — AMLODIPINE BESYLATE 10 MG/1
10 TABLET ORAL DAILY
Qty: 90 TABLET | Refills: 1 | Status: SHIPPED | OUTPATIENT
Start: 2023-01-05

## 2023-01-09 ENCOUNTER — TELEPHONE (OUTPATIENT)
Dept: INTERNAL MEDICINE | Facility: CLINIC | Age: 53
End: 2023-01-09
Payer: MEDICAID

## 2023-01-09 NOTE — TELEPHONE ENCOUNTER
Pt stats it will be about 6 months to get into the Hep C clinic in [facility] and was hoping  to go to the Hep C Clinic at  [address] in [place]. Please fax referral and notes. [phone]    Please call PT when the referral is faxed or with any questions.

## 2023-02-23 ENCOUNTER — OFFICE VISIT (OUTPATIENT)
Dept: INTERNAL MEDICINE | Facility: CLINIC | Age: 53
End: 2023-02-23
Payer: MEDICAID

## 2023-02-23 VITALS
HEIGHT: 64 IN | TEMPERATURE: 98.1 F | WEIGHT: 167.5 LBS | SYSTOLIC BLOOD PRESSURE: 124 MMHG | OXYGEN SATURATION: 98 % | DIASTOLIC BLOOD PRESSURE: 72 MMHG | BODY MASS INDEX: 28.6 KG/M2 | HEART RATE: 61 BPM

## 2023-02-23 DIAGNOSIS — Z12.31 ENCOUNTER FOR SCREENING MAMMOGRAM FOR MALIGNANT NEOPLASM OF BREAST: ICD-10-CM

## 2023-02-23 DIAGNOSIS — L90.0 LICHEN SCLEROSUS: ICD-10-CM

## 2023-02-23 DIAGNOSIS — R74.8 ELEVATED CK: ICD-10-CM

## 2023-02-23 DIAGNOSIS — Z00.01 ENCOUNTER FOR ROUTINE ADULT PHYSICAL EXAM WITH ABNORMAL FINDINGS: Primary | ICD-10-CM

## 2023-02-23 DIAGNOSIS — Z12.11 SCREENING FOR COLON CANCER: ICD-10-CM

## 2023-02-23 DIAGNOSIS — R30.0 DYSURIA: ICD-10-CM

## 2023-02-23 LAB
BILIRUB UR QL STRIP: NEGATIVE
CLARITY UR: CLEAR
COLOR UR: YELLOW
ERYTHROCYTE [SEDIMENTATION RATE] IN BLOOD: 13 MM/HR (ref 0–30)
GLUCOSE UR STRIP-MCNC: NEGATIVE MG/DL
HGB UR QL STRIP.AUTO: NEGATIVE
KETONES UR QL STRIP: NEGATIVE
LEUKOCYTE ESTERASE UR QL STRIP.AUTO: NEGATIVE
NITRITE UR QL STRIP: NEGATIVE
PH UR STRIP.AUTO: 5.5 [PH] (ref 5–8)
PROT UR QL STRIP: NEGATIVE
SP GR UR STRIP: 1.02 (ref 1–1.03)
UROBILINOGEN UR QL STRIP: NORMAL

## 2023-02-23 PROCEDURE — 86431 RHEUMATOID FACTOR QUANT: CPT | Performed by: INTERNAL MEDICINE

## 2023-02-23 PROCEDURE — 87186 SC STD MICRODIL/AGAR DIL: CPT | Performed by: INTERNAL MEDICINE

## 2023-02-23 PROCEDURE — 87086 URINE CULTURE/COLONY COUNT: CPT | Performed by: INTERNAL MEDICINE

## 2023-02-23 PROCEDURE — 82550 ASSAY OF CK (CPK): CPT | Performed by: INTERNAL MEDICINE

## 2023-02-23 PROCEDURE — 87077 CULTURE AEROBIC IDENTIFY: CPT | Performed by: INTERNAL MEDICINE

## 2023-02-23 PROCEDURE — 99396 PREV VISIT EST AGE 40-64: CPT | Performed by: INTERNAL MEDICINE

## 2023-02-23 PROCEDURE — 86140 C-REACTIVE PROTEIN: CPT | Performed by: INTERNAL MEDICINE

## 2023-02-23 PROCEDURE — 86038 ANTINUCLEAR ANTIBODIES: CPT | Performed by: INTERNAL MEDICINE

## 2023-02-23 PROCEDURE — 85652 RBC SED RATE AUTOMATED: CPT | Performed by: INTERNAL MEDICINE

## 2023-02-23 PROCEDURE — 86200 CCP ANTIBODY: CPT | Performed by: INTERNAL MEDICINE

## 2023-02-23 NOTE — PROGRESS NOTES
"Chief Complaint  Annual Exam (Also thinks she has kidney stones, had a bladder infection about a month ago and went to urgent care and was given Macrobid for it but states she has a history of kidney stones )    Subjective       Vanesa Escudero presents to [facility]    HPI   Vanesa Escudero is a 54 yo female who presents for an annual exam.     She states she thinks she has kidney stones. She went to an urgent care about a month ago and got Macrobid for a UTI. She states the pain got better, but is has not gone away. Pt states she has pain in her right flank and suprapubic pain. Pt states her urine has an odor and it is cloudy. She admits to dysuria, increased frequency, urgency, and decreased amount. Pt states she has had one episode of hematuria. Pt states she has had a low grade fever. Pt admits to nausea. Denies recent vomiting or diarrhea. Pt has not had any recent imaging.  Pt has had kidney stones 4x in the past, states she can't pass them on own. She states she had a stent in her kidney in the past.    She states that she thinks she has an autoimmune condition. States that she is sick all the time. She states that she was dx with lichen sclerosis by GYN and was told this was related to autoimmune condition. Denies joint pain, vision changes, eye or mouth dryness, eye redness, skin rash, hair loss, etc.     Objective     Vitals:    02/23/23 1338   BP: 124/72   BP Location: Left arm   Patient Position: Sitting   Cuff Size: Adult   Pulse: 61   Temp: 98.1 °F (36.7 °C)   TempSrc: Temporal   SpO2: 98%   Weight: 76 kg (167 lb 8 oz)   Height: 162.6 cm (64\")      Wt Readings from Last 3 Encounters:   02/23/23 76 kg (167 lb 8 oz)   12/29/22 74.9 kg (165 lb 3.2 oz)   12/12/22 76.8 kg (169 lb 6.4 oz)      BP Readings from Last 3 Encounters:   02/23/23 124/72   12/29/22 132/80   12/12/22 142/100        Body mass index is 28.75 kg/m².    BMI is >= 25 and <30. (Overweight) " The following options were offered after discussion;: exercise counseling/recommendations and nutrition counseling/recommendations       Physical Exam  Vitals reviewed.   Constitutional:       Appearance: Normal appearance. She is well-developed.   HENT:      Head: Normocephalic and atraumatic.      Mouth/Throat:      Pharynx: No oropharyngeal exudate.   Eyes:      Conjunctiva/sclera: Conjunctivae normal.      Pupils: Pupils are equal, round, and reactive to light.   Cardiovascular:      Rate and Rhythm: Normal rate and regular rhythm.      Heart sounds: No murmur heard.    No friction rub. No gallop.   Pulmonary:      Effort: Pulmonary effort is normal.      Breath sounds: Normal breath sounds. No wheezing or rhonchi.   Skin:     General: Skin is warm and dry.   Neurological:      Mental Status: She is alert and oriented to person, place, and time.   Psychiatric:         Mood and Affect: Affect normal.          Result Review :   The following data was reviewed by: Genevieve Zuleta MD on 02/23/2023:        Procedures    Assessment and Plan   Diagnoses and all orders for this visit:    1. Encounter for routine adult physical exam with abnormal findings (Primary)  Assessment & Plan:  Screening labs reviewed/ordered  Counseling provided regarding age appropriate screenings and immunizations, healthy diet and exercise.       2. Dysuria  Assessment & Plan:  Will recheck urine and send for culture as she has been previously treated with partial resolution of symptoms.     Orders:  -     Urinalysis With Culture If Indicated - Urine, Clean Catch  -     Urine Culture - Urine, Urine, Clean Catch    3. Lichen sclerosus  Assessment & Plan:  Will order autoimmune panel per patient request.     Orders:  -     ARVIN Direct Reflex to 11 Biomarker; Future  -     Cyclic citrul peptide antibody, IgG/IgA; Future  -     CK; Future  -     C-reactive protein; Future  -     Rheumatoid Factor; Future  -     Sedimentation rate;  Future  -     ARVIN Direct Reflex to 11 Biomarker  -     Cyclic citrul peptide antibody, IgG/IgA  -     CK  -     C-reactive protein  -     Rheumatoid Factor  -     Sedimentation rate    4. Encounter for screening mammogram for malignant neoplasm of breast  -     Mammo Screening Digital Tomosynthesis Bilateral With CAD; Future    5. Screening for colon cancer  -     Cologuard - Stool, Per Rectum; Future    6. Visit for screening mammogram      I spent 60 minutes caring for Vanesa on this date of service. 25 minutes of this time was spent on activities related to her annual physical. 35 minutes of this time was spent addressing her acute complaints and worsening chronic conditions. This time includes time spent by me in the following activities:preparing for the visit, reviewing tests, performing a medically appropriate examination and/or evaluation , counseling and educating the patient/family/caregiver, ordering medications, tests, or procedures and documenting information in the medical record     Follow Up   Return in about 3 months (around 5/23/2023) for Next scheduled follow up.  Patient was given instructions and counseling regarding her condition or for health maintenance advice. Please see specific information pulled into the AVS if appropriate.     Patient was instructed and educated on their diagnoses and treatment plan prior to leaving the office. If symptoms worsen or persist, seek emergency medical attention. Take all medications as prescribed. Call the office if any questions or concerns arise.

## 2023-02-24 PROBLEM — Z00.00 ANNUAL PHYSICAL EXAM: Status: ACTIVE | Noted: 2023-02-24

## 2023-02-24 PROBLEM — L90.0 LICHEN SCLEROSUS: Status: ACTIVE | Noted: 2023-02-24

## 2023-02-24 PROBLEM — Z00.01 ENCOUNTER FOR ROUTINE ADULT PHYSICAL EXAM WITH ABNORMAL FINDINGS: Status: ACTIVE | Noted: 2023-02-24

## 2023-02-24 PROBLEM — R30.0 DYSURIA: Status: ACTIVE | Noted: 2023-02-24

## 2023-02-24 LAB
CHROMATIN AB SERPL-ACNC: <10 IU/ML (ref 0–14)
CK SERPL-CCNC: 539 U/L (ref 20–180)
CRP SERPL-MCNC: <0.3 MG/DL (ref 0–0.5)

## 2023-02-24 NOTE — ASSESSMENT & PLAN NOTE
Will recheck urine and send for culture as she has been previously treated with partial resolution of symptoms.

## 2023-02-25 LAB
ANA SER QL: NEGATIVE
BACTERIA SPEC AEROBE CULT: ABNORMAL
CCP IGA+IGG SERPL IA-ACNC: 2 UNITS (ref 0–19)

## 2023-02-27 RX ORDER — CEPHALEXIN 500 MG/1
500 CAPSULE ORAL 2 TIMES DAILY
Qty: 20 CAPSULE | Refills: 0 | Status: SHIPPED | OUTPATIENT
Start: 2023-02-27 | End: 2023-03-09

## 2023-04-07 RX ORDER — METOPROLOL SUCCINATE 100 MG/1
100 TABLET, EXTENDED RELEASE ORAL DAILY
Qty: 90 TABLET | Refills: 0 | Status: SHIPPED | OUTPATIENT
Start: 2023-04-07

## 2023-04-07 NOTE — TELEPHONE ENCOUNTER
Caller: Vanesa Escudero LU    Relationship: Self    Best call back number: [phone]     Requested Prescriptions:   Requested Prescriptions     Pending Prescriptions Disp Refills   • metoprolol succinate XL (Toprol XL) 100 MG 24 hr tablet 90 tablet 1     Sig: Take 1 tablet by mouth Daily. For hypertension        Pharmacy where request should be sent: [id number] - [place], [place] - [address] AT [facility] - [phone] [facility] [phone] FX     Last office visit with prescribing clinician: 2/23/2023   Last telemedicine visit with prescribing clinician: Visit date not found   Next office visit with prescribing clinician: Visit date not found     Additional details provided by patient: PATIENT IS CURRENTLY OUT OF THE MEDICATION.     Does the patient have less than a 3 day supply:  [x] Yes  [] No    Would you like a call back once the refill request has been completed: [x] Yes [] No    If the office needs to give you a call back, can they leave a voicemail: [x] Yes [] No    Zulma Meredith Rep   04/07/23 11:05 EDT

## 2023-04-10 RX ORDER — METOPROLOL SUCCINATE 100 MG/1
TABLET, EXTENDED RELEASE ORAL
Qty: 90 TABLET | Refills: 0 | OUTPATIENT
Start: 2023-04-10

## 2023-05-18 ENCOUNTER — OFFICE VISIT (OUTPATIENT)
Dept: INTERNAL MEDICINE | Facility: CLINIC | Age: 53
End: 2023-05-18
Payer: MEDICAID

## 2023-05-18 VITALS
TEMPERATURE: 97.9 F | WEIGHT: 160.2 LBS | SYSTOLIC BLOOD PRESSURE: 160 MMHG | DIASTOLIC BLOOD PRESSURE: 101 MMHG | BODY MASS INDEX: 27.5 KG/M2

## 2023-05-18 DIAGNOSIS — I51.7 HYPERTROPHIC CARDIOMEGALY: ICD-10-CM

## 2023-05-18 DIAGNOSIS — I51.7 LEFT VENTRICULAR HYPERTROPHY: ICD-10-CM

## 2023-05-18 DIAGNOSIS — R53.83 OTHER FATIGUE: ICD-10-CM

## 2023-05-18 DIAGNOSIS — R60.0 BILATERAL LOWER EXTREMITY EDEMA: ICD-10-CM

## 2023-05-18 DIAGNOSIS — R82.90 CLOUDY URINE: ICD-10-CM

## 2023-05-18 DIAGNOSIS — I10 PRIMARY HYPERTENSION: Primary | ICD-10-CM

## 2023-05-18 LAB
25(OH)D3 SERPL-MCNC: 55.2 NG/ML (ref 30–100)
ALBUMIN SERPL-MCNC: 4.3 G/DL (ref 3.5–5.2)
ALBUMIN/GLOB SERPL: 1.3 G/DL
ALP SERPL-CCNC: 107 U/L (ref 39–117)
ALT SERPL W P-5'-P-CCNC: 14 U/L (ref 1–33)
ANION GAP SERPL CALCULATED.3IONS-SCNC: 6 MMOL/L (ref 5–15)
AST SERPL-CCNC: 21 U/L (ref 1–32)
BASOPHILS # BLD AUTO: 0.04 10*3/MM3 (ref 0–0.2)
BASOPHILS NFR BLD AUTO: 0.3 % (ref 0–1.5)
BILIRUB SERPL-MCNC: <0.2 MG/DL (ref 0–1.2)
BUN SERPL-MCNC: 18 MG/DL (ref 6–20)
BUN/CREAT SERPL: 13.5 (ref 7–25)
CALCIUM SPEC-SCNC: 9.5 MG/DL (ref 8.6–10.5)
CHLORIDE SERPL-SCNC: 103 MMOL/L (ref 98–107)
CHOLEST SERPL-MCNC: 225 MG/DL (ref 0–200)
CO2 SERPL-SCNC: 28 MMOL/L (ref 22–29)
CREAT SERPL-MCNC: 1.33 MG/DL (ref 0.57–1)
DEPRECATED RDW RBC AUTO: 39.3 FL (ref 37–54)
EGFRCR SERPLBLD CKD-EPI 2021: 48.5 ML/MIN/1.73
EOSINOPHIL # BLD AUTO: 0.47 10*3/MM3 (ref 0–0.4)
EOSINOPHIL NFR BLD AUTO: 4 % (ref 0.3–6.2)
ERYTHROCYTE [DISTWIDTH] IN BLOOD BY AUTOMATED COUNT: 12.7 % (ref 12.3–15.4)
FERRITIN SERPL-MCNC: 74 NG/ML (ref 13–150)
FOLATE SERPL-MCNC: 5.39 NG/ML (ref 4.78–24.2)
GLOBULIN UR ELPH-MCNC: 3.4 GM/DL
GLUCOSE SERPL-MCNC: 84 MG/DL (ref 65–99)
HCT VFR BLD AUTO: 38.1 % (ref 34–46.6)
HDLC SERPL-MCNC: 43 MG/DL (ref 40–60)
HGB BLD-MCNC: 13.2 G/DL (ref 12–15.9)
IMM GRANULOCYTES # BLD AUTO: 0.04 10*3/MM3 (ref 0–0.05)
IMM GRANULOCYTES NFR BLD AUTO: 0.3 % (ref 0–0.5)
IRON 24H UR-MRATE: 41 MCG/DL (ref 37–145)
IRON SATN MFR SERPL: 9 % (ref 20–50)
LDLC SERPL CALC-MCNC: 159 MG/DL (ref 0–100)
LDLC/HDLC SERPL: 3.64 {RATIO}
LYMPHOCYTES # BLD AUTO: 3.33 10*3/MM3 (ref 0.7–3.1)
LYMPHOCYTES NFR BLD AUTO: 28.4 % (ref 19.6–45.3)
MCH RBC QN AUTO: 29.9 PG (ref 26.6–33)
MCHC RBC AUTO-ENTMCNC: 34.6 G/DL (ref 31.5–35.7)
MCV RBC AUTO: 86.4 FL (ref 79–97)
MONOCYTES # BLD AUTO: 0.95 10*3/MM3 (ref 0.1–0.9)
MONOCYTES NFR BLD AUTO: 8.1 % (ref 5–12)
NEUTROPHILS NFR BLD AUTO: 58.9 % (ref 42.7–76)
NEUTROPHILS NFR BLD AUTO: 6.89 10*3/MM3 (ref 1.7–7)
NRBC BLD AUTO-RTO: 0 /100 WBC (ref 0–0.2)
PLATELET # BLD AUTO: 287 10*3/MM3 (ref 140–450)
PMV BLD AUTO: 10.6 FL (ref 6–12)
POTASSIUM SERPL-SCNC: 4.4 MMOL/L (ref 3.5–5.2)
PROT SERPL-MCNC: 7.7 G/DL (ref 6–8.5)
RBC # BLD AUTO: 4.41 10*6/MM3 (ref 3.77–5.28)
SODIUM SERPL-SCNC: 137 MMOL/L (ref 136–145)
T4 FREE SERPL-MCNC: 1.21 NG/DL (ref 0.93–1.7)
TIBC SERPL-MCNC: 432 MCG/DL (ref 298–536)
TRANSFERRIN SERPL-MCNC: 290 MG/DL (ref 200–360)
TRIGL SERPL-MCNC: 128 MG/DL (ref 0–150)
TSH SERPL DL<=0.05 MIU/L-ACNC: 1.84 UIU/ML (ref 0.27–4.2)
VIT B12 BLD-MCNC: 407 PG/ML (ref 211–946)
VLDLC SERPL-MCNC: 23 MG/DL (ref 5–40)
WBC NRBC COR # BLD: 11.72 10*3/MM3 (ref 3.4–10.8)

## 2023-05-18 PROCEDURE — 84439 ASSAY OF FREE THYROXINE: CPT

## 2023-05-18 PROCEDURE — 82728 ASSAY OF FERRITIN: CPT

## 2023-05-18 PROCEDURE — 84466 ASSAY OF TRANSFERRIN: CPT

## 2023-05-18 PROCEDURE — 80061 LIPID PANEL: CPT

## 2023-05-18 PROCEDURE — 80053 COMPREHEN METABOLIC PANEL: CPT

## 2023-05-18 PROCEDURE — 82607 VITAMIN B-12: CPT

## 2023-05-18 PROCEDURE — 85025 COMPLETE CBC W/AUTO DIFF WBC: CPT

## 2023-05-18 PROCEDURE — 82746 ASSAY OF FOLIC ACID SERUM: CPT

## 2023-05-18 PROCEDURE — 83540 ASSAY OF IRON: CPT

## 2023-05-18 PROCEDURE — 84443 ASSAY THYROID STIM HORMONE: CPT

## 2023-05-18 PROCEDURE — 82306 VITAMIN D 25 HYDROXY: CPT

## 2023-05-18 PROCEDURE — 83880 ASSAY OF NATRIURETIC PEPTIDE: CPT

## 2023-05-18 RX ORDER — NALOXONE HYDROCHLORIDE 4 MG/.1ML
SPRAY NASAL
COMMUNITY
Start: 2023-02-27

## 2023-05-18 RX ORDER — DIAZEPAM 5 MG/1
1 TABLET ORAL EVERY 12 HOURS SCHEDULED
COMMUNITY
Start: 2023-05-01

## 2023-05-18 RX ORDER — BUPRENORPHINE 300 MG/1
SOLUTION SUBCUTANEOUS
COMMUNITY
Start: 2023-04-25

## 2023-05-18 NOTE — PROGRESS NOTES
Chief Complaint  Joint Swelling (Swelling in knee /Swelling in ankle/Right leg) and Edema (Swelling )    Subjective      Vanesa Escudero presents to [facility]  History of Present Illness    Vanesa is here for bilateral lower extremity swelling.  She said the swelling happens progressively throughout the day.  This has been going on for about 6 months.  She said the swelling is worse the longer she is on her feet but goes down overnight.  She says by 2 to 3:00 in the afternoon she is miserable.  She reports a left hip replacement in March 2022 for significant arthritis and is planned for right hip replacement in January 2023.  She said that she does have some shortness of breath randomly but has not really worsening from her baseline.  She denies any chest pain.  She is currently taking amlodipine 10 mg, which she has been on many years.  She is also taking losartan 100 mg daily.  She was also taking Toprol 100 mg daily.  She says she supposed to be taking clonidine but she has been out of that for quite some time.  She reports at 1 point she was put on HCTZ she thinks, and then she was also put on chlorthalidone.  She says that medication caused her stomach issues and she does not want to take that medication again.  She says that she smokes approximately 1 pack/day and 1 cup of coffee a day.  She is also currently prescribed Adderall and Valium.  She sees a therapist in [place] that manages those medications.     Current Outpatient Medications   Medication Instructions   • amLODIPine (NORVASC) 10 mg, Oral, Daily   • amphetamine-dextroamphetamine (ADDERALL) 10 MG tablet Adderall 10 mg oral tablet take 1 tablet (10 mg) by oral route once daily before breakfast for 30 days   Active   • cetirizine (ZYRTEC) 10 mg, Oral, Daily   • diazePAM (VALIUM) 5 MG tablet 1 tablet, Oral, Every 12 Hours Scheduled   • ibuprofen (ADVIL,MOTRIN) 100 MG/5ML suspension Oral, Every 6  Hours PRN   • losartan (COZAAR) 100 mg, Oral, Daily   • metoprolol succinate XL (TOPROL XL) 100 mg, Oral, Daily, For hypertension   • naloxone (NARCAN) 4 MG/0.1ML nasal spray No dose, route, or frequency recorded.   • naproxen (NAPROSYN) 250 mg, Oral, 2 Times Daily PRN   • Sublocade 300 MG/1.5ML solution prefilled syringe No dose, route, or frequency recorded.   • temazepam (RESTORIL) 15 mg, Oral, Daily       The following portions of the patient's history were reviewed and updated as appropriate: allergies, current medications, past family history, past medical history, past social history, past surgical history, and problem list.    Objective   Vital Signs:   BP (!) 160/101   Temp 97.9 °F (36.6 °C) (Temporal)   Wt 72.7 kg (160 lb 3.2 oz)   BMI 27.50 kg/m²     Wt Readings from Last 3 Encounters:   23 72.7 kg (160 lb 3.2 oz)   23 76 kg (167 lb 8 oz)   22 74.9 kg (165 lb 3.2 oz)     BP Readings from Last 3 Encounters:   23 (!) 160/101   23 124/72   22 132/80     Physical Exam  Vitals reviewed.   Constitutional:       Appearance: Normal appearance. She is well-developed.   HENT:      Head: Normocephalic and atraumatic.      Mouth/Throat:      Pharynx: No oropharyngeal exudate.   Eyes:      Conjunctiva/sclera: Conjunctivae normal.      Pupils: Pupils are equal, round, and reactive to light.   Cardiovascular:      Rate and Rhythm: Normal rate and regular rhythm.      Pulses: Normal pulses.           Dorsalis pedis pulses are 2+ on the right side and 2+ on the left side.        Posterior tibial pulses are 2+ on the right side and 2+ on the left side.      Heart sounds: Normal heart sounds. No murmur heard.    No friction rub. No gallop.   Pulmonary:      Effort: Pulmonary effort is normal.      Breath sounds: Normal breath sounds. No wheezing or rhonchi.   Musculoskeletal:      Right lower le+ Pitting Edema present.      Left lower le+ Pitting Edema present.   Skin:      General: Skin is warm and dry.   Neurological:      Mental Status: She is alert and oriented to person, place, and time.   Psychiatric:         Mood and Affect: Affect normal.          Result Review :  The following data was reviewed by: MARIO Barba on 05/18/2023:      Common labs        11/10/2022    11:41 12/29/2022    14:41 5/18/2023    11:39   Common Labs   Glucose 74    84     BUN 16    18     Creatinine 1.13    1.33     Sodium 141    137     Potassium 5.0    4.4     Chloride 106    103     Calcium 10.2    9.5     Albumin 4.30    4.3     Total Bilirubin 0.3    <0.2     Alkaline Phosphatase 132    107     AST (SGOT) 30    21     ALT (SGPT) 33    14     WBC 10.67   13.79   11.72     Hemoglobin 13.6   13.1   13.2     Hematocrit 40.1   40.3   38.1     Platelets 302   315   287     Total Cholesterol 217    225     Triglycerides 117    128     HDL Cholesterol 51    43     LDL Cholesterol  145    159     Hemoglobin A1C 5.70           Lab Results (last 72 hours)     Procedure Component Value Units Date/Time    Urinalysis With Culture If Indicated - Urine, Clean Catch [[id number]]  (Abnormal) Collected: 05/18/23 1058    Specimen: Urine, Clean Catch Updated: 05/19/23 1538     Color, UA Dark Yellow     Appearance, UA Clear     pH, UA 6.0     Specific Gravity, UA 1.020     Glucose, UA Negative     Ketones, UA Negative     Bilirubin, UA Negative     Blood, UA Negative     Protein, UA Negative     Leuk Esterase, UA Negative     Nitrite, UA Negative     Urobilinogen, UA 0.2 E.U./dL    Narrative:      In absence of clinical symptoms, the presence of pyuria, bacteria, and/or nitrites on the urinalysis result does not correlate with infection.  Urine microscopic not indicated.    Comprehensive Metabolic Panel [[id number]]  (Abnormal) Collected: 05/18/23 1139    Specimen: Blood from Arm, Left Updated: 05/18/23 1816     Glucose 84 mg/dL      BUN 18 mg/dL      Creatinine 1.33 mg/dL      Sodium 137 mmol/L      Potassium 4.4  mmol/L      Chloride 103 mmol/L      CO2 28.0 mmol/L      Calcium 9.5 mg/dL      Total Protein 7.7 g/dL      Albumin 4.3 g/dL      ALT (SGPT) 14 U/L      AST (SGOT) 21 U/L      Alkaline Phosphatase 107 U/L      Total Bilirubin <0.2 mg/dL      Globulin 3.4 gm/dL      A/G Ratio 1.3 g/dL      BUN/Creatinine Ratio 13.5     Anion Gap 6.0 mmol/L      eGFR 48.5 mL/min/1.73     Narrative:      GFR Normal >60  Chronic Kidney Disease <60  Kidney Failure <15      CBC & Differential [[id number]]  (Abnormal) Collected: 05/18/23 1139    Specimen: Blood from Arm, Left Updated: 05/18/23 1724    Narrative:      The following orders were created for panel order CBC & Differential.  Procedure                               Abnormality         Status                     ---------                               -----------         ------                     CBC Auto Differential[[id number]]        Abnormal            Final result                 Please view results for these tests on the individual orders.    TSH [[id number]]  (Normal) Collected: 05/18/23 1139    Specimen: Blood from Arm, Left Updated: 05/18/23 1821     TSH 1.840 uIU/mL     Lipid Panel [[id number]]  (Abnormal) Collected: 05/18/23 1139    Specimen: Blood from Arm, Left Updated: 05/18/23 1816     Total Cholesterol 225 mg/dL      Triglycerides 128 mg/dL      HDL Cholesterol 43 mg/dL      LDL Cholesterol  159 mg/dL      VLDL Cholesterol 23 mg/dL      LDL/HDL Ratio 3.64    Narrative:      Cholesterol Reference Ranges  (U.S. Department of Health and Human Services ATP III Classifications)    Desirable          <200 mg/dL  Borderline High    200-239 mg/dL  High Risk          >240 mg/dL      Triglyceride Reference Ranges  (U.S. Department of Health and Human Services ATP III Classifications)    Normal           <150 mg/dL  Borderline High  150-199 mg/dL  High             200-499 mg/dL  Very High        >500 mg/dL    HDL Reference Ranges  (U.S. Department of Health and Human Services  ATP III Classifications)    Low     <40 mg/dl (major risk factor for CHD)  High    >60 mg/dl ('negative' risk factor for CHD)        LDL Reference Ranges  (U.S. Department of Health and Human Services ATP III Classifications)    Optimal          <100 mg/dL  Near Optimal     100-129 mg/dL  Borderline High  130-159 mg/dL  High             160-189 mg/dL  Very High        >189 mg/dL    Vitamin D,25-Hydroxy [[id number]]  (Normal) Collected: 05/18/23 1139    Specimen: Blood from Arm, Left Updated: 05/18/23 1748     25 Hydroxy, Vitamin D 55.2 ng/ml     Narrative:      Reference Range for Total Vitamin D 25(OH)     Deficiency <20.0 ng/mL   Insufficiency 21-29 ng/mL   Sufficiency  ng/mL  Toxicity >100 ng/ml      Iron Profile [[id number]]  (Abnormal) Collected: 05/18/23 1139    Specimen: Blood from Arm, Left Updated: 05/18/23 1816     Iron 41 mcg/dL      Iron Saturation 9 %      Transferrin 290 mg/dL      TIBC 432 mcg/dL     Ferritin [[id number]]  (Normal) Collected: 05/18/23 1139    Specimen: Blood from Arm, Left Updated: 05/18/23 1821     Ferritin 74.00 ng/mL     Narrative:      Results may be falsely decreased if patient taking Biotin.      T4, Free [[id number]]  (Normal) Collected: 05/18/23 1139    Specimen: Blood from Arm, Left Updated: 05/18/23 1821     Free T4 1.21 ng/dL     Narrative:      Results may be falsely increased if patient taking Biotin.      Vitamin B12 & Folate [[id number]]  (Normal) Collected: 05/18/23 1139    Specimen: Blood from Arm, Left Updated: 05/18/23 1822     Folate 5.39 ng/mL      Vitamin B-12 407 pg/mL     Narrative:      Results may be falsely increased if patient taking Biotin.      CBC Auto Differential [[id number]]  (Abnormal) Collected: 05/18/23 1139    Specimen: Blood from Arm, Left Updated: 05/18/23 1724     WBC 11.72 10*3/mm3      RBC 4.41 10*6/mm3      Hemoglobin 13.2 g/dL      Hematocrit 38.1 %      MCV 86.4 fL      MCH 29.9 pg      MCHC 34.6 g/dL      RDW 12.7 %      RDW-SD 39.3 fl       MPV 10.6 fL      Platelets 287 10*3/mm3      Neutrophil % 58.9 %      Lymphocyte % 28.4 %      Monocyte % 8.1 %      Eosinophil % 4.0 %      Basophil % 0.3 %      Immature Grans % 0.3 %      Neutrophils, Absolute 6.89 10*3/mm3      Lymphocytes, Absolute 3.33 10*3/mm3      Monocytes, Absolute 0.95 10*3/mm3      Eosinophils, Absolute 0.47 10*3/mm3      Basophils, Absolute 0.04 10*3/mm3      Immature Grans, Absolute 0.04 10*3/mm3      nRBC 0.0 /100 WBC     proBNP [[id number]]  (Normal) Collected: 05/18/23 1139    Specimen: Blood from Arm, Left Updated: 05/19/23 1419     proBNP 150.0 pg/mL     Narrative:      Among patients with dyspnea, NT-proBNP is highly sensitive for the detection of acute congestive heart failure. In addition NT-proBNP of <300 pg/ml effectively rules out acute congestive heart failure with 99% negative predictive value.    Results may be falsely decreased if patient taking Biotin.             No Images in the past 120 days found..    Lab Results   Component Value Date    COVID19 Not Detected 09/17/2021    INR 0.9 01/17/2023    BILIRUBINUR Negative 05/18/2023       Procedures        Assessment and Plan   Diagnoses and all orders for this visit:    1. Primary hypertension (Primary)  Comments:  Elevated in office.  Patient reports compliance with medication. Labs today in office.  We will adjust based on kidney function  Orders:  -     Ambulatory Referral to Cardiology  -     ECG 12 Lead  -     Comprehensive Metabolic Panel  -     CBC & Differential  -     TSH  -     Lipid Panel  -     T4, Free  -     Cancel: proBNP; Future  -     proBNP    2. Left ventricular hypertrophy  Comments:  Patient with known left ventricular hypertrophy with uncontrolled blood pressure.  Cardiology referral placed.  Orders:  -     Ambulatory Referral to Cardiology    3. Hypertrophic cardiomegaly  -     Ambulatory Referral to Cardiology    4. Other fatigue  Comments:  Patient reports recent increase in fatigue.  Labs  drawn today.  Discussed most likely related to uncontrolled blood pressure.  Advised strict compliance with me  Orders:  -     Vitamin D,25-Hydroxy  -     Iron Profile  -     Ferritin  -     Vitamin B12 & Folate    5. Cloudy urine  Comments:  Patient reports she has cloudy urine most often.  Requesting urinalysis in office.  Treatment to follow results.  Orders:  -     Urinalysis With Culture If Indicated - Urine, Clean Catch    6. Bilateral lower extremity edema  Comments:  Minimal 1+ bilateral pitting edema.  Suspicious is related to amlodipine.  Will adjust meds based on results of blood work.  Orders:  -     Cancel: proBNP; Future  -     proBNP      Normal EKG in office.  Patient with mild 1+ bilateral lower extremity swelling.  Patient without chest pain or shortness of breath.  Suspicious that lower extremity swelling is most likely related to amlodipine.  Plan to discontinue amlodipine and add in HCTZ if kidney function is within normal limits/stable.  If kidney function is decreased or unable to tolerate diuretic, could consider switching from losartan to lisinopril since she is on the max dose of losartan.  Patient has been on clonidine before and reports she did not like taking the medication because she had to take it multiple times a day and it did not really work well.  Patient has not seen cardiology lately.  Cardiology referral has been placed.  Patient to follow-up next week for blood pressure check, swelling reevaluation, and discussion of lab work and medication manipulation.    Medications Discontinued During This Encounter   Medication Reason   • diazePAM (VALIUM) 10 MG tablet *Therapy completed          Follow Up   Return in about 1 week (around 5/25/2023).  Patient was given instructions and counseling regarding her condition or for health maintenance advice. Please see specific information pulled into the AVS if appropriate.       Cha Suarez, MARIO  05/20/23  12:18 EDT

## 2023-05-19 LAB
BILIRUB UR QL STRIP: NEGATIVE
CLARITY UR: CLEAR
COLOR UR: ABNORMAL
GLUCOSE UR STRIP-MCNC: NEGATIVE MG/DL
HGB UR QL STRIP.AUTO: NEGATIVE
KETONES UR QL STRIP: NEGATIVE
LEUKOCYTE ESTERASE UR QL STRIP.AUTO: NEGATIVE
NITRITE UR QL STRIP: NEGATIVE
NT-PROBNP SERPL-MCNC: 150 PG/ML (ref 0–900)
PH UR STRIP.AUTO: 6 [PH] (ref 5–8)
PROT UR QL STRIP: NEGATIVE
SP GR UR STRIP: 1.02 (ref 1–1.03)
UROBILINOGEN UR QL STRIP: ABNORMAL

## 2023-08-03 ENCOUNTER — OFFICE VISIT (OUTPATIENT)
Dept: CARDIOLOGY | Facility: CLINIC | Age: 53
End: 2023-08-03
Payer: MEDICAID

## 2023-08-03 ENCOUNTER — TELEPHONE (OUTPATIENT)
Dept: CARDIOLOGY | Facility: CLINIC | Age: 53
End: 2023-08-03

## 2023-08-03 VITALS
DIASTOLIC BLOOD PRESSURE: 95 MMHG | SYSTOLIC BLOOD PRESSURE: 166 MMHG | WEIGHT: 158.6 LBS | HEIGHT: 64 IN | BODY MASS INDEX: 27.08 KG/M2 | HEART RATE: 52 BPM

## 2023-08-03 DIAGNOSIS — I51.7 LEFT VENTRICULAR HYPERTROPHY: ICD-10-CM

## 2023-08-03 DIAGNOSIS — I10 PRIMARY HYPERTENSION: Primary | ICD-10-CM

## 2023-08-03 RX ORDER — METOPROLOL SUCCINATE 100 MG/1
100 TABLET, EXTENDED RELEASE ORAL DAILY
Qty: 90 TABLET | Refills: 0 | Status: SHIPPED | OUTPATIENT
Start: 2023-08-03 | End: 2023-08-03 | Stop reason: SDUPTHER

## 2023-08-03 RX ORDER — TELMISARTAN 40 MG/1
40 TABLET ORAL DAILY
Qty: 90 TABLET | Refills: 3 | Status: SHIPPED | OUTPATIENT
Start: 2023-08-03 | End: 2023-08-03 | Stop reason: SDUPTHER

## 2023-08-03 RX ORDER — TELMISARTAN 40 MG/1
40 TABLET ORAL DAILY
Qty: 90 TABLET | Refills: 3 | Status: SHIPPED | OUTPATIENT
Start: 2023-08-03 | End: 2023-08-04

## 2023-08-03 RX ORDER — METOPROLOL SUCCINATE 100 MG/1
100 TABLET, EXTENDED RELEASE ORAL 2 TIMES DAILY
Qty: 90 TABLET | Refills: 0 | Status: SHIPPED | OUTPATIENT
Start: 2023-08-03

## 2023-08-04 ENCOUNTER — TELEPHONE (OUTPATIENT)
Dept: CARDIOLOGY | Facility: CLINIC | Age: 53
End: 2023-08-04
Payer: MEDICAID

## 2023-08-04 RX ORDER — IRBESARTAN 150 MG/1
150 TABLET ORAL NIGHTLY
Qty: 90 TABLET | Refills: 3 | Status: SHIPPED | OUTPATIENT
Start: 2023-08-04

## 2023-08-04 RX ORDER — METOPROLOL SUCCINATE 100 MG/1
TABLET, EXTENDED RELEASE ORAL
Qty: 180 TABLET | OUTPATIENT
Start: 2023-08-04

## 2023-08-17 ENCOUNTER — TELEPHONE (OUTPATIENT)
Dept: INTERNAL MEDICINE | Facility: CLINIC | Age: 53
End: 2023-08-17
Payer: MEDICAID

## 2023-08-17 NOTE — TELEPHONE ENCOUNTER
Caller: Vanesa Escudero    Relationship: Self    Best call back number: [phone]    What is the best time to reach you: anytime    Who are you requesting to speak with (clinical staff, provider,  specific staff member): nurse     What was the call regarding: PATIENT IS NEEDING TO GET IN TO SEE SOMEONE SHE GOT BIT BY CAT AND ARM IS RED AND HOT.

## 2023-09-14 RX ORDER — METOPROLOL SUCCINATE 100 MG/1
TABLET, EXTENDED RELEASE ORAL
Qty: 180 TABLET | Refills: 3 | Status: SHIPPED | OUTPATIENT
Start: 2023-09-14

## 2024-11-04 RX ORDER — METOPROLOL SUCCINATE 100 MG/1
TABLET, EXTENDED RELEASE ORAL
Qty: 180 TABLET | Refills: 3 | Status: SHIPPED | OUTPATIENT
Start: 2024-11-04

## 2024-11-07 ENCOUNTER — TELEPHONE (OUTPATIENT)
Dept: INTERNAL MEDICINE | Facility: CLINIC | Age: 54
End: 2024-11-07
Payer: MEDICAID

## 2024-11-07 DIAGNOSIS — R68.89 FORGETFULNESS: ICD-10-CM

## 2024-11-07 DIAGNOSIS — F41.9 ANXIETY: Primary | ICD-10-CM

## 2024-11-07 NOTE — TELEPHONE ENCOUNTER
Caller: Vanesa Escudero    Relationship to patient: Self    Best call back number: [phone]     Chief complaint: REFERRAL TO PSYCHIATRIST AND DISCUSS CURRENT HEALTH CONCERNS.    Type of visit: OFFICE VISIT    Requested date: AS SOON AS POSSIBLE       Additional notes:PATIENT HAS SEEN THE SAME PSYCHIATRIST FOR 6 YEARS. PATIENT STATES THAT THE PROVIDER CLOSED THE PRACTICE AND DID NOT REFILL HER MEDICATIONS. PATIENT REQUESTING REFERRAL TO PSYCHIATRY AND WANTING TO DISCUSS CURRENT HEALTH CONCERNS.

## 2024-11-20 ENCOUNTER — OFFICE VISIT (OUTPATIENT)
Dept: INTERNAL MEDICINE | Facility: CLINIC | Age: 54
End: 2024-11-20
Payer: MEDICAID

## 2024-11-20 VITALS
HEIGHT: 64 IN | WEIGHT: 130.2 LBS | DIASTOLIC BLOOD PRESSURE: 110 MMHG | OXYGEN SATURATION: 99 % | TEMPERATURE: 96.5 F | HEART RATE: 74 BPM | RESPIRATION RATE: 16 BRPM | SYSTOLIC BLOOD PRESSURE: 162 MMHG | BODY MASS INDEX: 22.23 KG/M2

## 2024-11-20 DIAGNOSIS — Z76.0 MEDICATION REFILL: ICD-10-CM

## 2024-11-20 DIAGNOSIS — G47.00 INSOMNIA, UNSPECIFIED TYPE: ICD-10-CM

## 2024-11-20 DIAGNOSIS — F41.1 GENERALIZED ANXIETY DISORDER: ICD-10-CM

## 2024-11-20 DIAGNOSIS — I10 ESSENTIAL HYPERTENSION: Primary | ICD-10-CM

## 2024-11-20 LAB
AMPHET+METHAMPHET UR QL: NEGATIVE
AMPHETAMINE INTERNAL CONTROL: ABNORMAL
AMPHETAMINES UR QL: NEGATIVE
BARBITURATE INTERNAL CONTROL: ABNORMAL
BARBITURATES UR QL SCN: NEGATIVE
BENZODIAZ UR QL SCN: POSITIVE
BENZODIAZEPINE INTERNAL CONTROL: ABNORMAL
BUPRENORPHINE INTERNAL CONTROL: ABNORMAL
BUPRENORPHINE SERPL-MCNC: POSITIVE NG/ML
CANNABINOIDS SERPL QL: POSITIVE
COCAINE INTERNAL CONTROL: ABNORMAL
COCAINE UR QL: NEGATIVE
EXPIRATION DATE: ABNORMAL
Lab: ABNORMAL
MDMA (ECSTASY) INTERNAL CONTROL: ABNORMAL
MDMA UR QL SCN: NEGATIVE
METHADONE INTERNAL CONTROL: ABNORMAL
METHADONE UR QL SCN: NEGATIVE
METHAMPHETAMINE INTERNAL CONTROL: ABNORMAL
MORPHINE INTERNAL CONTROL: ABNORMAL
MORPHINE/OPIATES SCREEN, URINE: NEGATIVE
OXYCODONE INTERNAL CONTROL: ABNORMAL
OXYCODONE UR QL SCN: NEGATIVE
PCP UR QL SCN: NEGATIVE
PHENCYCLIDINE INTERNAL CONTROL: ABNORMAL
THC INTERNAL CONTROL: ABNORMAL

## 2024-11-20 PROCEDURE — 80305 DRUG TEST PRSMV DIR OPT OBS: CPT | Performed by: NURSE PRACTITIONER

## 2024-11-20 PROCEDURE — 3080F DIAST BP >= 90 MM HG: CPT | Performed by: NURSE PRACTITIONER

## 2024-11-20 PROCEDURE — 1159F MED LIST DOCD IN RCRD: CPT | Performed by: NURSE PRACTITIONER

## 2024-11-20 PROCEDURE — 1125F AMNT PAIN NOTED PAIN PRSNT: CPT | Performed by: NURSE PRACTITIONER

## 2024-11-20 PROCEDURE — 3077F SYST BP >= 140 MM HG: CPT | Performed by: NURSE PRACTITIONER

## 2024-11-20 PROCEDURE — 1160F RVW MEDS BY RX/DR IN RCRD: CPT | Performed by: NURSE PRACTITIONER

## 2024-11-20 PROCEDURE — 99213 OFFICE O/P EST LOW 20 MIN: CPT | Performed by: NURSE PRACTITIONER

## 2024-11-20 RX ORDER — DIAZEPAM 5 MG/1
5 TABLET ORAL EVERY 12 HOURS SCHEDULED
Status: CANCELLED | OUTPATIENT
Start: 2024-11-20

## 2024-11-20 RX ORDER — TEMAZEPAM 15 MG/1
15 CAPSULE ORAL DAILY
OUTPATIENT
Start: 2024-11-20

## 2024-11-20 RX ORDER — AMLODIPINE BESYLATE 5 MG/1
5 TABLET ORAL DAILY
Qty: 30 TABLET | Refills: 1 | Status: SHIPPED | OUTPATIENT
Start: 2024-11-20

## 2024-11-20 RX ORDER — DIAZEPAM 5 MG/1
5 TABLET ORAL EVERY 12 HOURS SCHEDULED
OUTPATIENT
Start: 2024-11-20

## 2024-11-20 NOTE — TELEPHONE ENCOUNTER
Caller: Vanesa Escudero    Relationship: Self    Best call back number: [phone]    What form or medical record are you requesting: NEEDING A DEMOGRAPHIC PRINT OFF     Who is requesting this form or medical record from you: [employer]     How would you like to receive the form or medical records (pick-up, mail, fax):      Timeframe paperwork needed: ASAP

## 2024-11-20 NOTE — TELEPHONE ENCOUNTER
These medications have never been prescribed for this patient by this office.   I did originally agree to prescribe her Temazepam until she could get established with new psychiatrist. However, her UDS is positive for THC (which is currently an illicit substance in the state [facility]) and Buprenorphine which is not on her medication list. Therefore, I do not feel comfortable prescribing this medication for her. She should contact her previous psychiatrist to obtain refills until her new patient appointment.

## 2024-11-20 NOTE — TELEPHONE ENCOUNTER
Last follow up visit date: 11/20/2024    Last urine drug screen date: 11/20/2024    Last consent/contract date: None on file     Does patient utilize [facility] pharmacy (yes or no)?    No

## 2024-11-20 NOTE — TELEPHONE ENCOUNTER
Caller: JEFFREY DONALDSON    Relationship: Emergency Contact    Best call back number: [phone]     Requested Prescriptions:   Requested Prescriptions     Pending Prescriptions Disp Refills    diazePAM (VALIUM) 5 MG tablet       Sig: Take 1 tablet by mouth Every 12 (Twelve) Hours.    temazepam (RESTORIL) 15 MG capsule       Sig: Take 1 capsule by mouth Daily.        Pharmacy where request should be sent: [facility] [id number] [address] [phone] [facility] [phone]      Last office visit with prescribing clinician: Visit date not found   Last telemedicine visit with prescribing clinician: Visit date not found   Next office visit with prescribing clinician: Visit date not found     Does the patient have less than a 3 day supply:  [x] Yes  [] No    Would you like a call back once the refill request has been completed: [x] Yes [] No    If the office needs to give you a call back, can they leave a voicemail: [x] Yes [] No    Zulma Meredith Rep   11/20/24 14:33 EST

## 2024-11-20 NOTE — TELEPHONE ENCOUNTER
I called and relayed the message to the patient and explained why our office can not prescribe the medications requested. She stated she understood and had no further concerns.

## 2024-11-20 NOTE — PROGRESS NOTES
"Chief Complaint  Med Refill and Depression    Subjective      Vanesa Escudero is a 54 y.o. female who presents to [facility]     HTN-  Managed with metoprolol. Stopped irbesartan due to stomach upset, stopped lisinopril due to cough. Has been on amlodipine in the past. She does not check her blood pressure at home but has felt like it is high. Denies chest pain, blurry vision, headache, leg swelling.     Patient states her psychiatrist is leaving, Diana Hollis, is starting a [facility] clinic. Patient is scheduled with psychiatry January 17th. Ran out of Adderall and temazepam two weeks ago and will run out of Valium today. She is requesting refill today.     Objective   Vital Signs:   Vitals:    11/20/24 1156   BP: (!) 162/110   BP Location: Left arm   Patient Position: Sitting   Cuff Size: Adult   Pulse: 74   Resp: 16   Temp: 96.5 °F (35.8 °C)   TempSrc: Temporal   SpO2: 99%   Weight: 59.1 kg (130 lb 3.2 oz)   Height: 162.6 cm (64\")     Body mass index is 22.35 kg/m².    Wt Readings from Last 3 Encounters:   11/20/24 59.1 kg (130 lb 3.2 oz)   08/03/23 71.9 kg (158 lb 9.6 oz)   05/18/23 72.7 kg (160 lb 3.2 oz)     BP Readings from Last 3 Encounters:   11/20/24 (!) 162/110   08/03/23 166/95   05/18/23 (!) 160/101       Health Maintenance   Topic Date Due    Pneumococcal Vaccine 0-64 (1 of 2 - PCV) Never done    MAMMOGRAM  Never done    PAP SMEAR  11/04/2021    LUNG CANCER SCREENING  Never done    ANNUAL PHYSICAL  12/03/2022    ZOSTER VACCINE (2 of 2) 05/31/2023    Hepatitis B (3 of 3 - Hep B Twinrix 3-dose series) 09/05/2023    LIPID PANEL  05/18/2024    INFLUENZA VACCINE  Never done    COVID-19 Vaccine (1 - 2024-25 season) Never done    COLORECTAL CANCER SCREENING  03/20/2026    TDAP/TD VACCINES (3 - Td or Tdap) 04/05/2033    HEPATITIS C SCREENING  Completed       Physical Exam  Constitutional:       Appearance: Normal appearance.   HENT:      Head: Normocephalic and " atraumatic.      Nose: Nose normal.      Mouth/Throat:      Mouth: Mucous membranes are moist.      Pharynx: Oropharynx is clear.   Eyes:      Extraocular Movements: Extraocular movements intact.      Conjunctiva/sclera: Conjunctivae normal.      Pupils: Pupils are equal, round, and reactive to light.   Cardiovascular:      Rate and Rhythm: Normal rate and regular rhythm.      Heart sounds: Normal heart sounds.   Pulmonary:      Effort: Pulmonary effort is normal.      Breath sounds: Normal breath sounds.   Skin:     General: Skin is warm and dry.   Neurological:      General: No focal deficit present.      Mental Status: She is alert and oriented to person, place, and time.   Psychiatric:         Mood and Affect: Mood normal.         Behavior: Behavior normal.         Thought Content: Thought content normal.          Result Review :  The following data was reviewed by: MARIO Brewster on 11/20/2024:         Procedures          Assessment & Plan  Essential hypertension  Significant elevation today, patient attributes to increase in anxiety due to medications. Continue metoprolol, will start amlodipine. She will monitor blood pressure daily x 2 weeks and return to clinic with log for review and labs. She will notify clinic sooner with consistent elevations greater than 130/80s.        Medication refill    Orders:    POC Medline 12 Panel Urine Drug Screen    Generalized anxiety disorder  Discussed plan of care with Dr. Zuleta, who would be agreeable to filling temazepam short term while patient is waiting to get in with psychiatry based on results of TEE and HUGH. Patient will discuss further medication options further with psychiatry at upcoming appointment. She will continue to monitor and seek medical attention immediately if she feels that her mental health is deteriorating. Denies SI/HI. Follow up in two weeks as discussed, sooner if concerns arise.         Insomnia, unspecified type              BMI is  within normal parameters. No other follow-up for BMI required.         FOLLOW UP  Return in about 2 weeks (around 12/4/2024) for Follow up with Dr. Zuleta.  Patient was given instructions and counseling regarding her condition or for health maintenance advice. Please see specific information pulled into the AVS if appropriate.       Latasha Shannan, MARIO  11/20/24  14:19 EST    CURRENT & DISCONTINUED MEDICATIONS  Current Outpatient Medications   Medication Instructions    amLODIPine (NORVASC) 5 mg, Oral, Daily    amphetamine-dextroamphetamine (ADDERALL) 10 MG tablet Adderall 10 mg oral tablet take 1 tablet (10 mg) by oral route once daily before breakfast for 30 days   Active    diazePAM (VALIUM) 5 MG tablet 1 tablet, Every 12 Hours Scheduled    ibuprofen (ADVIL,MOTRIN) 100 MG/5ML suspension Every 6 Hours PRN    metoprolol succinate XL (TOPROL-XL) 100 MG 24 hr tablet TAKE 1 TABLET BY MOUTH TWICE DAILY    temazepam (RESTORIL) 15 mg, Daily       Medications Discontinued During This Encounter   Medication Reason    irbesartan (Avapro) 150 MG tablet

## 2025-01-27 ENCOUNTER — NURSE TRIAGE (OUTPATIENT)
Dept: CALL CENTER | Facility: HOSPITAL | Age: 55
End: 2025-01-27
Payer: MEDICAID

## 2025-01-27 NOTE — TELEPHONE ENCOUNTER
Call transferred from [facility].  Patient has a new pt appt in March but is having HTN.  She went to a local ED last night and her BP was 243/133.  She was given hydralazine and 2 clonidine before it finally came down.  She asked for a clonidine prescription but the provider would not give her one.  She is on losartan bid and metoprolol bid.  Has not missed any meds.  Reason for Disposition  • [1] Systolic BP  >= 130 OR Diastolic >= 80 AND [2] taking BP medications    Additional Information  • Negative: Difficult to awaken or acting confused (e.g., disoriented, slurred speech)  • Negative: SEVERE difficulty breathing (e.g., struggling for each breath, speaks in single words)  • Negative: [1] Weakness of the face, arm or leg on one side of the body AND [2] new-onset  • Negative: [1] Numbness (i.e., loss of sensation) of the face, arm or leg on one side of the body AND [2] new-onset  • Negative: [1] Chest pain lasts > 5 minutes AND [2] history of heart disease (i.e., heart attack, bypass surgery, angina, angioplasty, CHF)  • Negative: [1] Chest pain AND [2] took nitrogylcerin AND [3] pain was not relieved  • Negative: Sounds like a life-threatening emergency to the triager  • Negative: Symptom is main concern (e.g., headache, chest pain)  • Negative: Low blood pressure is main concern  • Negative: [1] Systolic BP  >= 160 OR Diastolic >= 100 AND [2] cardiac (e.g., breathing difficulty, chest pain) or neurologic symptoms (e.g., new-onset blurred or double vision, unsteady gait)  • Negative: [1] Pregnant 20 or more weeks (or postpartum < 6 weeks) AND [2] new hand or face swelling  • Negative: [1] Pregnant 20 or more weeks (or postpartum < 6 weeks) AND [2] Systolic BP >= 160 OR Diastolic >= 110  • Negative: [1] Systolic BP  >= 200 OR Diastolic >= 120 AND [2] having NO cardiac or neurologic symptoms  • Negative: [1] Pregnant 20 or more weeks (or postpartum < 6 weeks) AND [2] Systolic BP  >= 140 OR Diastolic >= 90  • Negative:  "[1] Systolic BP  >= 180 OR Diastolic >= 110 AND [2] missed most recent dose of blood pressure medication  • Negative: Systolic BP  >= 180 OR Diastolic >= 110  • Negative: Ran out of BP medications  • Negative: Systolic BP  >= 160 OR Diastolic >= 100  • Negative: [1] Taking BP medications AND [2] feels is having side effects (e.g., impotence, cough, dizzy upon standing)  • Negative: [1] Systolic BP  >= 130 OR Diastolic >= 80 AND [2] pregnant    Answer Assessment - Initial Assessment Questions  1. BLOOD PRESSURE: \"What is the blood pressure?\" \"Did you take at least two measurements 5 minutes apart?\"      Last night it was 243/133 in the ED  2. ONSET: \"When did you take your blood pressure?\"      In the ED last night  3. HOW: \"How did you take your blood pressure?\" (e.g., automatic home BP monitor, visiting nurse)      nurse  4. HISTORY: \"Do you have a history of high blood pressure?\"      yes  5. MEDICINES: \"Are you taking any medicines for blood pressure?\" \"Have you missed any doses recently?\"      On meds, no missed doses  6. OTHER SYMPTOMS: \"Do you have any symptoms?\" (e.g., blurred vision, chest pain, difficulty breathing, headache, weakness)      Did last night.  7. PREGNANCY: \"Is there any chance you are pregnant?\" \"When was your last menstrual period?\"      na    Protocols used: Blood Pressure - High-ADULT-AH    "

## (undated) DEVICE — APPL CHLORAPREP HI/LITE 26ML ORNG

## (undated) DEVICE — BNDG ELAS CO-FLEX SLF ADHR 2IN 5YD LF STRL

## (undated) DEVICE — GLV SURG BIOGEL LTX PF 8

## (undated) DEVICE — BALL PIN JURGAN .062 GRN

## (undated) DEVICE — STPLR SKIN VISISTAT WD 35CT

## (undated) DEVICE — SKIN PREP TRAY W/CHG: Brand: MEDLINE INDUSTRIES, INC.

## (undated) DEVICE — DRP C/ARM 41X74IN

## (undated) DEVICE — PRECISION THIN (9.0 X 0.38 X 25.0MM)

## (undated) DEVICE — SYS PERFUS SEP PLATLT W TIPS CUST

## (undated) DEVICE — TRAP FLD MINIVAC MEGADYNE 100ML

## (undated) DEVICE — PIN BB TAK MTP THRD

## (undated) DEVICE — Device

## (undated) DEVICE — DRSNG GZ PETROLTM XEROFORM CURAD 1X8IN STRL

## (undated) DEVICE — SUT VIC 4/0 RB1 27IN J214H

## (undated) DEVICE — SUT ETHLN 4/0 PS2 PLSTC 1667G

## (undated) DEVICE — 8MM BONE GRAFT DRILL: Brand: ACUMED

## (undated) DEVICE — PREP SOL POVIDONE/IODINE BT 4OZ

## (undated) DEVICE — BNDG GZ SOF-FORM CONFRM 2X75IN LF STRL

## (undated) DEVICE — GOWN,NON-REINFORCED,SIRUS,SET IN SLV,XXL: Brand: MEDLINE

## (undated) DEVICE — SUT VIC 3/0 SH 27IN J416H

## (undated) DEVICE — PK ORTHO MINOR TOWER 40

## (undated) DEVICE — WEBRIL* CAST PADDING: Brand: DEROYAL

## (undated) DEVICE — REAMR PHALANGL 20MM

## (undated) DEVICE — BIT DRL FOR USE W LOCK SCRW3MM 2.2MM

## (undated) DEVICE — DISPOSABLE TOURNIQUET CUFF SINGLE BLADDER, SINGLE PORT AND QUICK CONNECT CONNECTOR: Brand: COLOR CUFF

## (undated) DEVICE — KWIRED TROC 9X.062IN
Type: IMPLANTABLE DEVICE | Site: TOE FIRST | Status: NON-FUNCTIONAL
Removed: 2021-05-04

## (undated) DEVICE — PENCL E/S ULTRAVAC TELESCP NOSE HOLSTR 10FT

## (undated) DEVICE — GLV SURG SIGNATURE ESSENTIAL PF LTX SZ8

## (undated) DEVICE — IMPLANTABLE DEVICE
Type: IMPLANTABLE DEVICE | Site: TOE FIRST | Status: NON-FUNCTIONAL
Removed: 2021-05-04

## (undated) DEVICE — ELECTRD NDL EZ CLN MOD 2.75IN

## (undated) DEVICE — REAMR PHALANGL 22MM

## (undated) DEVICE — PATIENT RETURN ELECTRODE, SINGLE-USE, CONTACT QUALITY MONITORING, ADULT, WITH 9FT CORD, FOR PATIENTS WEIGING OVER 33LBS. (15KG): Brand: MEGADYNE

## (undated) DEVICE — UNDERCAST PADDING: Brand: DEROYAL

## (undated) DEVICE — REAMR PHALANGL 18MM

## (undated) DEVICE — 3M™ STERI-DRAPE™ U-DRAPE 1015: Brand: STERI-DRAPE™

## (undated) DEVICE — BIT DRL COMPRESSION/FT MICRO